# Patient Record
Sex: MALE | Race: WHITE | NOT HISPANIC OR LATINO | Employment: UNEMPLOYED | URBAN - METROPOLITAN AREA
[De-identification: names, ages, dates, MRNs, and addresses within clinical notes are randomized per-mention and may not be internally consistent; named-entity substitution may affect disease eponyms.]

---

## 2017-01-23 ENCOUNTER — HISTORICAL (OUTPATIENT)
Dept: LAB | Facility: HOSPITAL | Age: 62
End: 2017-01-23

## 2017-01-30 ENCOUNTER — HISTORICAL (OUTPATIENT)
Dept: INFUSION THERAPY | Facility: HOSPITAL | Age: 62
End: 2017-01-30

## 2017-01-31 ENCOUNTER — HISTORICAL (OUTPATIENT)
Dept: INFUSION THERAPY | Facility: HOSPITAL | Age: 62
End: 2017-01-31

## 2017-02-01 ENCOUNTER — HISTORICAL (OUTPATIENT)
Dept: INFUSION THERAPY | Facility: HOSPITAL | Age: 62
End: 2017-02-01

## 2017-02-02 ENCOUNTER — HISTORICAL (OUTPATIENT)
Dept: INFUSION THERAPY | Facility: HOSPITAL | Age: 62
End: 2017-02-02

## 2017-02-03 ENCOUNTER — HISTORICAL (OUTPATIENT)
Dept: INFUSION THERAPY | Facility: HOSPITAL | Age: 62
End: 2017-02-03

## 2017-02-04 ENCOUNTER — HISTORICAL (OUTPATIENT)
Dept: INFUSION THERAPY | Facility: HOSPITAL | Age: 62
End: 2017-02-04

## 2017-09-06 ENCOUNTER — HISTORICAL (OUTPATIENT)
Dept: RADIOLOGY | Facility: HOSPITAL | Age: 62
End: 2017-09-06

## 2017-09-06 LAB — POC CREATININE: 1.3 MG/DL (ref 0.6–1.3)

## 2018-04-19 ENCOUNTER — HISTORICAL (OUTPATIENT)
Dept: ADMINISTRATIVE | Facility: HOSPITAL | Age: 63
End: 2018-04-19

## 2018-04-19 LAB
BUN SERPL-MCNC: 22 MG/DL (ref 7–18)
CALCIUM SERPL-MCNC: 10.6 MG/DL (ref 8.5–10.1)
CHLORIDE SERPL-SCNC: 104 MMOL/L (ref 98–107)
CO2 SERPL-SCNC: 29 MMOL/L (ref 21–32)
CREAT SERPL-MCNC: 1.5 MG/DL (ref 0.6–1.3)
CREAT/UREA NIT SERPL: 15
GLUCOSE SERPL-MCNC: 85 MG/DL (ref 74–106)
POTASSIUM SERPL-SCNC: 4.3 MMOL/L (ref 3.5–5.1)
SODIUM SERPL-SCNC: 141 MMOL/L (ref 136–145)

## 2018-04-23 ENCOUNTER — HISTORICAL (OUTPATIENT)
Dept: UROLOGY | Facility: CLINIC | Age: 63
End: 2018-04-23

## 2018-05-21 ENCOUNTER — HISTORICAL (OUTPATIENT)
Dept: ADMINISTRATIVE | Facility: HOSPITAL | Age: 63
End: 2018-05-21

## 2018-05-21 LAB
PSA SERPL-MCNC: 0.2 NG/ML
PTH-INTACT SERPL-MCNC: 6.4 PG/ML (ref 18.4–80.1)

## 2018-05-23 ENCOUNTER — HISTORICAL (OUTPATIENT)
Dept: ADMINISTRATIVE | Facility: HOSPITAL | Age: 63
End: 2018-05-23

## 2018-06-05 ENCOUNTER — HISTORICAL (OUTPATIENT)
Dept: RADIOLOGY | Facility: HOSPITAL | Age: 63
End: 2018-06-05

## 2018-08-10 ENCOUNTER — HISTORICAL (OUTPATIENT)
Dept: ADMINISTRATIVE | Facility: HOSPITAL | Age: 63
End: 2018-08-10

## 2018-08-10 LAB
ABS NEUT (OLG): 3.19 X10(3)/MCL (ref 2.1–9.2)
ALBUMIN SERPL-MCNC: 3.9 GM/DL (ref 3.4–5)
ALBUMIN/GLOB SERPL: 1 RATIO (ref 1–2)
ALP SERPL-CCNC: 76 UNIT/L (ref 45–117)
ALT SERPL-CCNC: 20 UNIT/L (ref 12–78)
AST SERPL-CCNC: 22 UNIT/L (ref 15–37)
BASOPHILS # BLD AUTO: 0.02 X10(3)/MCL
BASOPHILS NFR BLD AUTO: 0 %
BILIRUB SERPL-MCNC: 0.6 MG/DL (ref 0.2–1)
BILIRUBIN DIRECT+TOT PNL SERPL-MCNC: 0.2 MG/DL
BILIRUBIN DIRECT+TOT PNL SERPL-MCNC: 0.4 MG/DL
BUN SERPL-MCNC: 26 MG/DL (ref 7–18)
CALCIUM SERPL-MCNC: 9.6 MG/DL (ref 8.5–10.1)
CHLORIDE SERPL-SCNC: 104 MMOL/L (ref 98–107)
CO2 SERPL-SCNC: 29 MMOL/L (ref 21–32)
CREAT SERPL-MCNC: 1.7 MG/DL (ref 0.6–1.3)
EOSINOPHIL # BLD AUTO: 0.12 10*3/UL
EOSINOPHIL NFR BLD AUTO: 3 %
ERYTHROCYTE [DISTWIDTH] IN BLOOD BY AUTOMATED COUNT: 13.6 % (ref 11.5–14.5)
GLOBULIN SER-MCNC: 3.9 GM/ML (ref 2.3–3.5)
GLUCOSE SERPL-MCNC: 99 MG/DL (ref 74–106)
HAV IGM SERPL QL IA: NONREACTIVE
HBV CORE IGM SERPL QL IA: NONREACTIVE
HBV SURFACE AG SERPL QL IA: NEGATIVE
HCT VFR BLD AUTO: 33.2 % (ref 40–51)
HCV AB SERPL QL IA: NONREACTIVE
HGB BLD-MCNC: 11 GM/DL (ref 13.5–17.5)
HIV 1+2 AB+HIV1 P24 AG SERPL QL IA: NONREACTIVE
IMM GRANULOCYTES # BLD AUTO: 0.04 10*3/UL
IMM GRANULOCYTES NFR BLD AUTO: 1 %
LYMPHOCYTES # BLD AUTO: 0.73 X10(3)/MCL
LYMPHOCYTES NFR BLD AUTO: 16 % (ref 13–40)
MCH RBC QN AUTO: 29.4 PG (ref 26–34)
MCHC RBC AUTO-ENTMCNC: 33.1 GM/DL (ref 31–37)
MCV RBC AUTO: 88.8 FL (ref 80–100)
MONOCYTES # BLD AUTO: 0.43 X10(3)/MCL
MONOCYTES NFR BLD AUTO: 10 % (ref 4–12)
NEUTROPHILS # BLD AUTO: 3.19 X10(3)/MCL
NEUTROPHILS NFR BLD AUTO: 70 X10(3)/MCL
PLATELET # BLD AUTO: 201 X10(3)/MCL (ref 130–400)
PMV BLD AUTO: 9.2 FL (ref 7.4–10.4)
POTASSIUM SERPL-SCNC: 4.7 MMOL/L (ref 3.5–5.1)
PROT SERPL-MCNC: 7.8 GM/DL (ref 6.4–8.2)
RBC # BLD AUTO: 3.74 X10(6)/MCL (ref 4.5–5.9)
SODIUM SERPL-SCNC: 140 MMOL/L (ref 136–145)
WBC # SPEC AUTO: 4.5 X10(3)/MCL (ref 4.5–11)

## 2018-08-23 ENCOUNTER — HISTORICAL (OUTPATIENT)
Dept: CARDIOLOGY | Facility: CLINIC | Age: 63
End: 2018-08-23

## 2018-08-23 LAB
BUN SERPL-MCNC: 23 MG/DL (ref 7–18)
CALCIUM SERPL-MCNC: 9.1 MG/DL (ref 8.5–10.1)
CHLORIDE SERPL-SCNC: 104 MMOL/L (ref 98–107)
CO2 SERPL-SCNC: 27 MMOL/L (ref 21–32)
CREAT SERPL-MCNC: 1.5 MG/DL (ref 0.6–1.3)
CREAT/UREA NIT SERPL: 15
GLUCOSE SERPL-MCNC: 116 MG/DL (ref 74–106)
POTASSIUM SERPL-SCNC: 4.2 MMOL/L (ref 3.5–5.1)
SODIUM SERPL-SCNC: 138 MMOL/L (ref 136–145)

## 2018-10-22 ENCOUNTER — HISTORICAL (OUTPATIENT)
Dept: ADMINISTRATIVE | Facility: HOSPITAL | Age: 63
End: 2018-10-22

## 2018-10-22 LAB — VIT B12 SERPL-MCNC: 185 PG/ML (ref 193–986)

## 2018-11-02 ENCOUNTER — HISTORICAL (OUTPATIENT)
Dept: RADIOLOGY | Facility: HOSPITAL | Age: 63
End: 2018-11-02

## 2019-02-23 ENCOUNTER — HISTORICAL (OUTPATIENT)
Dept: LAB | Facility: HOSPITAL | Age: 64
End: 2019-02-23

## 2019-02-23 LAB
ALBUMIN SERPL-MCNC: 3.8 GM/DL (ref 3.4–5)
ALBUMIN/GLOB SERPL: 1 RATIO (ref 1.1–2)
ALP SERPL-CCNC: 74 UNIT/L (ref 45–117)
ALT SERPL-CCNC: 20 UNIT/L (ref 12–78)
AST SERPL-CCNC: 28 UNIT/L (ref 15–37)
BILIRUB SERPL-MCNC: 0.6 MG/DL (ref 0.2–1)
BILIRUBIN DIRECT+TOT PNL SERPL-MCNC: 0.2 MG/DL
BILIRUBIN DIRECT+TOT PNL SERPL-MCNC: 0.4 MG/DL
BUN SERPL-MCNC: 31 MG/DL (ref 7–18)
CALCIUM SERPL-MCNC: 9.7 MG/DL (ref 8.5–10.1)
CHLORIDE SERPL-SCNC: 104 MMOL/L (ref 98–107)
CHOLEST SERPL-MCNC: 153 MG/DL
CHOLEST/HDLC SERPL: 2.8 {RATIO} (ref 0–5)
CO2 SERPL-SCNC: 27 MMOL/L (ref 21–32)
CREAT SERPL-MCNC: 1.8 MG/DL (ref 0.6–1.3)
GLOBULIN SER-MCNC: 4 GM/ML (ref 2.3–3.5)
GLUCOSE SERPL-MCNC: 96 MG/DL (ref 74–106)
HDLC SERPL-MCNC: 54 MG/DL
LDLC SERPL CALC-MCNC: 75 MG/DL (ref 0–130)
POTASSIUM SERPL-SCNC: 5 MMOL/L (ref 3.5–5.1)
PROT SERPL-MCNC: 7.8 GM/DL (ref 6.4–8.2)
PSA SERPL-MCNC: 0.2 NG/ML
SODIUM SERPL-SCNC: 136 MMOL/L (ref 136–145)
TRIGL SERPL-MCNC: 121 MG/DL
VLDLC SERPL CALC-MCNC: 24 MG/DL

## 2019-06-26 ENCOUNTER — HISTORICAL (OUTPATIENT)
Dept: RADIOLOGY | Facility: HOSPITAL | Age: 64
End: 2019-06-26

## 2019-08-27 ENCOUNTER — HISTORICAL (OUTPATIENT)
Dept: ADMINISTRATIVE | Facility: HOSPITAL | Age: 64
End: 2019-08-27

## 2019-08-27 LAB
BUN SERPL-MCNC: 29 MG/DL (ref 7–18)
CALCIUM SERPL-MCNC: 9.2 MG/DL (ref 8.5–10.1)
CHLORIDE SERPL-SCNC: 106 MMOL/L (ref 98–107)
CO2 SERPL-SCNC: 26 MMOL/L (ref 21–32)
CREAT SERPL-MCNC: 1.9 MG/DL (ref 0.6–1.3)
CREAT/UREA NIT SERPL: 15
GLUCOSE SERPL-MCNC: 114 MG/DL (ref 74–106)
POTASSIUM SERPL-SCNC: 5.1 MMOL/L (ref 3.5–5.1)
SODIUM SERPL-SCNC: 138 MMOL/L (ref 136–145)

## 2019-09-10 ENCOUNTER — HISTORICAL (OUTPATIENT)
Dept: ADMINISTRATIVE | Facility: HOSPITAL | Age: 64
End: 2019-09-10

## 2019-09-10 LAB
BUN SERPL-MCNC: 20 MG/DL (ref 7–18)
CALCIUM SERPL-MCNC: 8.8 MG/DL (ref 8.5–10.1)
CHLORIDE SERPL-SCNC: 104 MMOL/L (ref 98–107)
CO2 SERPL-SCNC: 26 MMOL/L (ref 21–32)
CREAT SERPL-MCNC: 1.7 MG/DL (ref 0.6–1.3)
CREAT/UREA NIT SERPL: 12
GLUCOSE SERPL-MCNC: 116 MG/DL (ref 74–106)
POTASSIUM SERPL-SCNC: 4.7 MMOL/L (ref 3.5–5.1)
SODIUM SERPL-SCNC: 138 MMOL/L (ref 136–145)

## 2019-09-24 ENCOUNTER — HISTORICAL (OUTPATIENT)
Dept: CARDIOLOGY | Facility: CLINIC | Age: 64
End: 2019-09-24

## 2019-09-24 LAB
BUN SERPL-MCNC: 26 MG/DL (ref 7–18)
CALCIUM SERPL-MCNC: 9.6 MG/DL (ref 8.5–10.1)
CHLORIDE SERPL-SCNC: 107 MMOL/L (ref 98–107)
CO2 SERPL-SCNC: 25 MMOL/L (ref 21–32)
CREAT SERPL-MCNC: 1.7 MG/DL (ref 0.6–1.3)
CREAT/UREA NIT SERPL: 15
GLUCOSE SERPL-MCNC: 100 MG/DL (ref 74–106)
POTASSIUM SERPL-SCNC: 4.9 MMOL/L (ref 3.5–5.1)
SODIUM SERPL-SCNC: 139 MMOL/L (ref 136–145)

## 2019-12-18 ENCOUNTER — HISTORICAL (OUTPATIENT)
Dept: NEPHROLOGY | Facility: CLINIC | Age: 64
End: 2019-12-18

## 2019-12-18 LAB
ALBUMIN SERPL-MCNC: 3.7 GM/DL (ref 3.4–5)
ALBUMIN/GLOB SERPL: 0.8 RATIO (ref 1.1–2)
ALP SERPL-CCNC: 92 UNIT/L (ref 45–117)
ALT SERPL-CCNC: 17 UNIT/L (ref 12–78)
APPEARANCE, UA: CLEAR
AST SERPL-CCNC: 18 UNIT/L (ref 15–37)
BACTERIA #/AREA URNS AUTO: ABNORMAL /HPF
BILIRUB SERPL-MCNC: 0.9 MG/DL (ref 0.2–1)
BILIRUB UR QL STRIP: NEGATIVE
BILIRUBIN DIRECT+TOT PNL SERPL-MCNC: 0.2 MG/DL (ref 0–0.2)
BILIRUBIN DIRECT+TOT PNL SERPL-MCNC: 0.7 MG/DL
BUN SERPL-MCNC: 28 MG/DL (ref 7–18)
CALCIUM SERPL-MCNC: 9.4 MG/DL (ref 8.5–10.1)
CHLORIDE SERPL-SCNC: 104 MMOL/L (ref 98–107)
CO2 SERPL-SCNC: 24 MMOL/L (ref 21–32)
COLOR UR: YELLOW
CREAT SERPL-MCNC: 2 MG/DL (ref 0.6–1.3)
CREAT UR-MCNC: 283 MG/DL
GLOBULIN SER-MCNC: 4.5 GM/ML (ref 2.3–3.5)
GLUCOSE (UA): NEGATIVE
GLUCOSE SERPL-MCNC: 128 MG/DL (ref 74–106)
HGB UR QL STRIP: 0.03 MG/DL
HYALINE CASTS #/AREA URNS LPF: ABNORMAL /LPF
KETONES UR QL STRIP: NEGATIVE
LEUKOCYTE ESTERASE UR QL STRIP: NEGATIVE
MUCOUS THREADS URNS QL MICRO: ABNORMAL
NITRITE UR QL STRIP: NEGATIVE
PH UR STRIP: 5.5 [PH] (ref 4.5–8)
POTASSIUM SERPL-SCNC: 4.8 MMOL/L (ref 3.5–5.1)
PROT SERPL-MCNC: 8.2 GM/DL (ref 6.4–8.2)
PROT UR QL STRIP: 30 MG/DL
PROT UR STRIP-MCNC: 50.9 MG/DL
PROT/CREAT UR-RTO: 179.9 MG/GM
RBC #/AREA URNS AUTO: ABNORMAL /HPF
SODIUM SERPL-SCNC: 136 MMOL/L (ref 136–145)
SP GR UR STRIP: 1.01 (ref 1–1.03)
SQUAMOUS #/AREA URNS LPF: ABNORMAL /LPF
UROBILINOGEN UR STRIP-ACNC: NORMAL
WBC #/AREA URNS AUTO: ABNORMAL /HPF

## 2020-07-07 ENCOUNTER — HISTORICAL (OUTPATIENT)
Dept: CARDIOLOGY | Facility: CLINIC | Age: 65
End: 2020-07-07

## 2020-07-07 LAB
ABS NEUT (OLG): 2.78 X10(3)/MCL (ref 2.1–9.2)
ALBUMIN SERPL-MCNC: 3.6 GM/DL (ref 3.4–5)
ALBUMIN/GLOB SERPL: 0.9 RATIO (ref 1.1–2)
ALP SERPL-CCNC: 114 UNIT/L (ref 45–117)
ALT SERPL-CCNC: 21 UNIT/L (ref 12–78)
AST SERPL-CCNC: 17 UNIT/L (ref 15–37)
BASOPHILS # BLD AUTO: 0 X10(3)/MCL (ref 0–0.2)
BASOPHILS NFR BLD AUTO: 0 %
BILIRUB SERPL-MCNC: 0.6 MG/DL (ref 0.2–1)
BILIRUBIN DIRECT+TOT PNL SERPL-MCNC: 0.2 MG/DL (ref 0–0.2)
BILIRUBIN DIRECT+TOT PNL SERPL-MCNC: 0.4 MG/DL
BUN SERPL-MCNC: 25 MG/DL (ref 7–18)
CALCIUM SERPL-MCNC: 8.8 MG/DL (ref 8.5–10.1)
CHLORIDE SERPL-SCNC: 108 MMOL/L (ref 98–107)
CHOLEST SERPL-MCNC: 146 MG/DL
CHOLEST/HDLC SERPL: 3 {RATIO} (ref 0–5)
CO2 SERPL-SCNC: 24 MMOL/L (ref 21–32)
CREAT SERPL-MCNC: 1.8 MG/DL (ref 0.6–1.3)
EOSINOPHIL # BLD AUTO: 0.2 X10(3)/MCL (ref 0–0.9)
EOSINOPHIL NFR BLD AUTO: 5 %
ERYTHROCYTE [DISTWIDTH] IN BLOOD BY AUTOMATED COUNT: 13.7 % (ref 11.5–14.5)
GLOBULIN SER-MCNC: 4.1 GM/ML (ref 2.3–3.5)
GLUCOSE SERPL-MCNC: 122 MG/DL (ref 74–106)
HCT VFR BLD AUTO: 36.9 % (ref 40–51)
HDLC SERPL-MCNC: 48 MG/DL (ref 40–59)
HGB BLD-MCNC: 11.8 GM/DL (ref 13.5–17.5)
IMM GRANULOCYTES # BLD AUTO: 0.05 10*3/UL
IMM GRANULOCYTES NFR BLD AUTO: 1 %
LDLC SERPL CALC-MCNC: 76 MG/DL
LYMPHOCYTES # BLD AUTO: 1 X10(3)/MCL (ref 0.6–4.6)
LYMPHOCYTES NFR BLD AUTO: 23 %
MCH RBC QN AUTO: 28.2 PG (ref 26–34)
MCHC RBC AUTO-ENTMCNC: 32 GM/DL (ref 31–37)
MCV RBC AUTO: 88.1 FL (ref 80–100)
MONOCYTES # BLD AUTO: 0.5 X10(3)/MCL (ref 0.1–1.3)
MONOCYTES NFR BLD AUTO: 11 %
NEUTROPHILS # BLD AUTO: 2.78 X10(3)/MCL (ref 2.1–9.2)
NEUTROPHILS NFR BLD AUTO: 60 %
PLATELET # BLD AUTO: 179 X10(3)/MCL (ref 130–400)
PMV BLD AUTO: 9.2 FL (ref 7.4–10.4)
POTASSIUM SERPL-SCNC: 4.8 MMOL/L (ref 3.5–5.1)
PROT SERPL-MCNC: 7.7 GM/DL (ref 6.4–8.2)
RBC # BLD AUTO: 4.19 X10(6)/MCL (ref 4.5–5.9)
SODIUM SERPL-SCNC: 138 MMOL/L (ref 136–145)
TRIGL SERPL-MCNC: 108 MG/DL
TSH SERPL-ACNC: 1.85 MIU/L (ref 0.36–3.74)
VLDLC SERPL CALC-MCNC: 22 MG/DL
WBC # SPEC AUTO: 4.6 X10(3)/MCL (ref 4.5–11)

## 2020-08-20 ENCOUNTER — HISTORICAL (OUTPATIENT)
Dept: CARDIOLOGY | Facility: CLINIC | Age: 65
End: 2020-08-20

## 2020-08-20 LAB
BUN SERPL-MCNC: 27 MG/DL (ref 7–18)
CALCIUM SERPL-MCNC: 8.9 MG/DL (ref 8.5–10.1)
CHLORIDE SERPL-SCNC: 107 MMOL/L (ref 98–107)
CO2 SERPL-SCNC: 24 MMOL/L (ref 21–32)
CREAT SERPL-MCNC: 1.8 MG/DL (ref 0.6–1.3)
CREAT/UREA NIT SERPL: 15 MG/DL (ref 12–14)
GLUCOSE SERPL-MCNC: 129 MG/DL (ref 74–106)
MAGNESIUM SERPL-MCNC: 1.7 MG/DL (ref 1.6–2.6)
POTASSIUM SERPL-SCNC: 4.5 MMOL/L (ref 3.5–5.1)
SODIUM SERPL-SCNC: 138 MMOL/L (ref 136–145)

## 2020-11-03 ENCOUNTER — HISTORICAL (OUTPATIENT)
Dept: RADIOLOGY | Facility: HOSPITAL | Age: 65
End: 2020-11-03

## 2020-12-15 ENCOUNTER — HISTORICAL (OUTPATIENT)
Dept: CARDIOLOGY | Facility: HOSPITAL | Age: 65
End: 2020-12-15

## 2020-12-15 LAB
BUN SERPL-MCNC: 24 MG/DL (ref 8.4–25.7)
CALCIUM SERPL-MCNC: 9.7 MG/DL (ref 8.8–10)
CHLORIDE SERPL-SCNC: 105 MMOL/L (ref 98–107)
CO2 SERPL-SCNC: 25 MMOL/L (ref 23–31)
CREAT SERPL-MCNC: 1.77 MG/DL (ref 0.73–1.18)
CREAT/UREA NIT SERPL: 14
GLUCOSE SERPL-MCNC: 148 MG/DL (ref 82–115)
MAGNESIUM SERPL-MCNC: 1.6 MG/DL (ref 1.6–2.6)
POTASSIUM SERPL-SCNC: 5.6 MMOL/L (ref 3.5–5.1)
SODIUM SERPL-SCNC: 138 MMOL/L (ref 136–145)

## 2020-12-28 ENCOUNTER — HISTORICAL (OUTPATIENT)
Dept: CARDIOLOGY | Facility: HOSPITAL | Age: 65
End: 2020-12-28

## 2020-12-28 LAB — POTASSIUM SERPL-SCNC: 4.7 MMOL/L (ref 3.5–5.1)

## 2021-01-06 ENCOUNTER — HISTORICAL (OUTPATIENT)
Dept: NEPHROLOGY | Facility: CLINIC | Age: 66
End: 2021-01-06

## 2021-01-06 LAB
ABS NEUT (OLG): 3.31 X10(3)/MCL (ref 2.1–9.2)
ALBUMIN SERPL-MCNC: 4 GM/DL (ref 3.4–4.8)
ALBUMIN/GLOB SERPL: 1.1 RATIO (ref 1.1–2)
ALP SERPL-CCNC: 102 UNIT/L (ref 40–150)
ALT SERPL-CCNC: 14 UNIT/L (ref 0–55)
APPEARANCE, UA: CLEAR
AST SERPL-CCNC: 16 UNIT/L (ref 5–34)
BACTERIA #/AREA URNS AUTO: ABNORMAL /HPF
BASOPHILS # BLD AUTO: 0 X10(3)/MCL (ref 0–0.2)
BASOPHILS NFR BLD AUTO: 0 %
BILIRUB SERPL-MCNC: 0.7 MG/DL
BILIRUB UR QL STRIP: NEGATIVE
BILIRUBIN DIRECT+TOT PNL SERPL-MCNC: 0.3 MG/DL (ref 0–0.5)
BILIRUBIN DIRECT+TOT PNL SERPL-MCNC: 0.4 MG/DL (ref 0–0.8)
BUN SERPL-MCNC: 27 MG/DL (ref 8.4–25.7)
CALCIUM SERPL-MCNC: 9.4 MG/DL (ref 8.8–10)
CHLORIDE SERPL-SCNC: 105 MMOL/L (ref 98–107)
CO2 SERPL-SCNC: 26 MMOL/L (ref 23–31)
COLOR UR: YELLOW
CREAT SERPL-MCNC: 1.91 MG/DL (ref 0.73–1.18)
CREAT UR-MCNC: 159.2 MG/DL (ref 58–161)
EOSINOPHIL # BLD AUTO: 0.4 X10(3)/MCL (ref 0–0.9)
EOSINOPHIL NFR BLD AUTO: 7 %
ERYTHROCYTE [DISTWIDTH] IN BLOOD BY AUTOMATED COUNT: 13.7 % (ref 11.5–14.5)
FERRITIN SERPL-MCNC: 32.71 NG/ML (ref 21.81–274.66)
GLOBULIN SER-MCNC: 3.6 GM/DL (ref 2.4–3.5)
GLUCOSE (UA): NEGATIVE
GLUCOSE SERPL-MCNC: 136 MG/DL (ref 82–115)
HCT VFR BLD AUTO: 38.4 % (ref 40–51)
HGB BLD-MCNC: 12.2 GM/DL (ref 13.5–17.5)
HGB UR QL STRIP: NEGATIVE
HYALINE CASTS #/AREA URNS LPF: ABNORMAL /LPF
IMM GRANULOCYTES # BLD AUTO: 0.05 10*3/UL
IMM GRANULOCYTES NFR BLD AUTO: 1 %
IRON SATN MFR SERPL: 19 % (ref 20–50)
IRON SERPL-MCNC: 52 UG/DL (ref 65–175)
KETONES UR QL STRIP: NEGATIVE
LEUKOCYTE ESTERASE UR QL STRIP: NEGATIVE
LYMPHOCYTES # BLD AUTO: 1.4 X10(3)/MCL (ref 0.6–4.6)
LYMPHOCYTES NFR BLD AUTO: 24 %
MCH RBC QN AUTO: 29 PG (ref 26–34)
MCHC RBC AUTO-ENTMCNC: 31.8 GM/DL (ref 31–37)
MCV RBC AUTO: 91.4 FL (ref 80–100)
MONOCYTES # BLD AUTO: 0.6 X10(3)/MCL (ref 0.1–1.3)
MONOCYTES NFR BLD AUTO: 10 %
NEUTROPHILS # BLD AUTO: 3.31 X10(3)/MCL (ref 2.1–9.2)
NEUTROPHILS NFR BLD AUTO: 57 %
NITRITE UR QL STRIP: NEGATIVE
PH UR STRIP: 5.5 [PH] (ref 4.5–8)
PLATELET # BLD AUTO: 231 X10(3)/MCL (ref 130–400)
PMV BLD AUTO: 9.1 FL (ref 7.4–10.4)
POTASSIUM SERPL-SCNC: 5.3 MMOL/L (ref 3.5–5.1)
PROT SERPL-MCNC: 7.6 GM/DL (ref 5.8–7.6)
PROT UR QL STRIP: 30 MG/DL
PROT UR STRIP-MCNC: 12.4 MG/DL
PROT/CREAT UR-RTO: 77.9 MG/GM
RBC # BLD AUTO: 4.2 X10(6)/MCL (ref 4.5–5.9)
RBC #/AREA URNS AUTO: ABNORMAL /HPF
SODIUM SERPL-SCNC: 138 MMOL/L (ref 136–145)
SP GR UR STRIP: 1.01 (ref 1–1.03)
SQUAMOUS #/AREA URNS LPF: ABNORMAL /LPF
TIBC SERPL-MCNC: 225 UG/DL (ref 69–240)
TIBC SERPL-MCNC: 277 UG/DL (ref 250–450)
TRANSFERRIN SERPL-MCNC: 238 MG/DL (ref 163–344)
UROBILINOGEN UR STRIP-ACNC: NORMAL
WBC # SPEC AUTO: 5.8 X10(3)/MCL (ref 4.5–11)
WBC #/AREA URNS AUTO: ABNORMAL /HPF

## 2021-01-15 ENCOUNTER — HISTORICAL (OUTPATIENT)
Dept: NEPHROLOGY | Facility: CLINIC | Age: 66
End: 2021-01-15

## 2021-01-15 LAB
BUN SERPL-MCNC: 26 MG/DL (ref 8.4–25.7)
CALCIUM SERPL-MCNC: 9.3 MG/DL (ref 8.8–10)
CHLORIDE SERPL-SCNC: 106 MMOL/L (ref 98–107)
CO2 SERPL-SCNC: 26 MMOL/L (ref 23–31)
CREAT SERPL-MCNC: 1.8 MG/DL (ref 0.73–1.18)
CREAT/UREA NIT SERPL: 14
GLUCOSE SERPL-MCNC: 124 MG/DL (ref 82–115)
POTASSIUM SERPL-SCNC: 5.2 MMOL/L (ref 3.5–5.1)
SODIUM SERPL-SCNC: 138 MMOL/L (ref 136–145)

## 2021-01-22 ENCOUNTER — HISTORICAL (OUTPATIENT)
Dept: NEPHROLOGY | Facility: CLINIC | Age: 66
End: 2021-01-22

## 2021-01-22 LAB
ALBUMIN SERPL-MCNC: 3.9 GM/DL (ref 3.4–4.8)
ALBUMIN/GLOB SERPL: 1.1 RATIO (ref 1.1–2)
ALP SERPL-CCNC: 98 UNIT/L (ref 40–150)
ALT SERPL-CCNC: 12 UNIT/L (ref 0–55)
AST SERPL-CCNC: 16 UNIT/L (ref 5–34)
BILIRUB SERPL-MCNC: 0.7 MG/DL
BILIRUBIN DIRECT+TOT PNL SERPL-MCNC: 0.2 MG/DL (ref 0–0.5)
BILIRUBIN DIRECT+TOT PNL SERPL-MCNC: 0.5 MG/DL (ref 0–0.8)
BUN SERPL-MCNC: 16 MG/DL (ref 8.4–25.7)
CALCIUM SERPL-MCNC: 9.2 MG/DL (ref 8.8–10)
CHLORIDE SERPL-SCNC: 104 MMOL/L (ref 98–107)
CO2 SERPL-SCNC: 25 MMOL/L (ref 23–31)
CREAT SERPL-MCNC: 1.67 MG/DL (ref 0.73–1.18)
GLOBULIN SER-MCNC: 3.7 GM/DL (ref 2.4–3.5)
GLUCOSE SERPL-MCNC: 156 MG/DL (ref 82–115)
POTASSIUM SERPL-SCNC: 4.8 MMOL/L (ref 3.5–5.1)
PROT SERPL-MCNC: 7.6 GM/DL (ref 5.8–7.6)
PTH-INTACT SERPL-MCNC: 220.4 PG/ML (ref 18.4–80.1)
SODIUM SERPL-SCNC: 138 MMOL/L (ref 136–145)

## 2021-05-05 ENCOUNTER — HISTORICAL (OUTPATIENT)
Dept: CARDIOLOGY | Facility: CLINIC | Age: 66
End: 2021-05-05

## 2021-05-05 LAB
ALBUMIN SERPL-MCNC: 3.8 GM/DL (ref 3.4–4.8)
ALBUMIN/GLOB SERPL: 1 RATIO (ref 1.1–2)
ALP SERPL-CCNC: 84 UNIT/L (ref 40–150)
ALT SERPL-CCNC: 12 UNIT/L (ref 0–55)
AST SERPL-CCNC: 17 UNIT/L (ref 5–34)
BILIRUB SERPL-MCNC: 0.7 MG/DL
BILIRUBIN DIRECT+TOT PNL SERPL-MCNC: 0.3 MG/DL (ref 0–0.5)
BILIRUBIN DIRECT+TOT PNL SERPL-MCNC: 0.4 MG/DL (ref 0–0.8)
BUN SERPL-MCNC: 26.1 MG/DL (ref 8.4–25.7)
CALCIUM SERPL-MCNC: 9.7 MG/DL (ref 8.8–10)
CHLORIDE SERPL-SCNC: 105 MMOL/L (ref 98–107)
CHOLEST SERPL-MCNC: 146 MG/DL
CHOLEST/HDLC SERPL: 4 {RATIO} (ref 0–5)
CO2 SERPL-SCNC: 23 MMOL/L (ref 23–31)
CREAT SERPL-MCNC: 1.81 MG/DL (ref 0.73–1.18)
GLOBULIN SER-MCNC: 3.7 GM/DL (ref 2.4–3.5)
GLUCOSE SERPL-MCNC: 143 MG/DL (ref 82–115)
HDLC SERPL-MCNC: 36 MG/DL (ref 35–60)
LDLC SERPL CALC-MCNC: 80 MG/DL (ref 50–140)
POTASSIUM SERPL-SCNC: 4.9 MMOL/L (ref 3.5–5.1)
PROT SERPL-MCNC: 7.5 GM/DL (ref 5.8–7.6)
SODIUM SERPL-SCNC: 136 MMOL/L (ref 136–145)
TRIGL SERPL-MCNC: 150 MG/DL (ref 34–140)
VLDLC SERPL CALC-MCNC: 30 MG/DL

## 2021-09-03 ENCOUNTER — HISTORICAL (OUTPATIENT)
Dept: ADMINISTRATIVE | Facility: HOSPITAL | Age: 66
End: 2021-09-03

## 2021-09-03 LAB
ABS NEUT (OLG): 4.22 X10(3)/MCL (ref 2.1–9.2)
ALBUMIN SERPL-MCNC: 3.7 GM/DL (ref 3.4–4.8)
ALBUMIN/GLOB SERPL: 1 RATIO (ref 1.1–2)
ALP SERPL-CCNC: 91 UNIT/L (ref 40–150)
ALT SERPL-CCNC: 12 UNIT/L (ref 0–55)
APPEARANCE, UA: CLEAR
AST SERPL-CCNC: 19 UNIT/L (ref 5–34)
BACTERIA #/AREA URNS AUTO: ABNORMAL /HPF
BASOPHILS # BLD AUTO: 0 X10(3)/MCL (ref 0–0.2)
BASOPHILS NFR BLD AUTO: 0 %
BILIRUB SERPL-MCNC: 0.5 MG/DL
BILIRUB UR QL STRIP: NEGATIVE
BILIRUBIN DIRECT+TOT PNL SERPL-MCNC: 0.2 MG/DL (ref 0–0.5)
BILIRUBIN DIRECT+TOT PNL SERPL-MCNC: 0.3 MG/DL (ref 0–0.8)
BUN SERPL-MCNC: 24.3 MG/DL (ref 8.4–25.7)
CALCIUM SERPL-MCNC: 9.4 MG/DL (ref 8.8–10)
CHLORIDE SERPL-SCNC: 107 MMOL/L (ref 98–107)
CO2 SERPL-SCNC: 21 MMOL/L (ref 23–31)
COLOR UR: YELLOW
CREAT SERPL-MCNC: 2.04 MG/DL (ref 0.73–1.18)
CREAT UR-MCNC: 219.6 MG/DL (ref 58–161)
DEPRECATED CALCIDIOL+CALCIFEROL SERPL-MC: 26.7 NG/ML (ref 30–80)
EOSINOPHIL # BLD AUTO: 0.5 X10(3)/MCL (ref 0–0.9)
EOSINOPHIL NFR BLD AUTO: 7 %
ERYTHROCYTE [DISTWIDTH] IN BLOOD BY AUTOMATED COUNT: 13.8 % (ref 11.5–14.5)
GLOBULIN SER-MCNC: 3.8 GM/DL (ref 2.4–3.5)
GLUCOSE (UA): NEGATIVE
GLUCOSE SERPL-MCNC: 150 MG/DL (ref 82–115)
HCT VFR BLD AUTO: 36.9 % (ref 40–51)
HGB BLD-MCNC: 11.9 GM/DL (ref 13.5–17.5)
HGB UR QL STRIP: 0.03 MG/DL
HYALINE CASTS #/AREA URNS LPF: ABNORMAL /LPF
IMM GRANULOCYTES # BLD AUTO: 0.06 10*3/UL
IMM GRANULOCYTES NFR BLD AUTO: 1 %
KETONES UR QL STRIP: NEGATIVE
LEUKOCYTE ESTERASE UR QL STRIP: 25 LEU/UL
LYMPHOCYTES # BLD AUTO: 1.6 X10(3)/MCL (ref 0.6–4.6)
LYMPHOCYTES NFR BLD AUTO: 24 %
MAGNESIUM SERPL-MCNC: 1.7 MG/DL (ref 1.6–2.6)
MCH RBC QN AUTO: 28.7 PG (ref 26–34)
MCHC RBC AUTO-ENTMCNC: 32.2 GM/DL (ref 31–37)
MCV RBC AUTO: 89.1 FL (ref 80–100)
MONOCYTES # BLD AUTO: 0.5 X10(3)/MCL (ref 0.1–1.3)
MONOCYTES NFR BLD AUTO: 7 %
NEUTROPHILS # BLD AUTO: 4.22 X10(3)/MCL (ref 2.1–9.2)
NEUTROPHILS NFR BLD AUTO: 62 %
NITRITE UR QL STRIP: NEGATIVE
NRBC BLD AUTO-RTO: 0 % (ref 0–0.2)
PH UR STRIP: 5.5 [PH] (ref 4.5–8)
PHOSPHATE SERPL-MCNC: 2.9 MG/DL (ref 2.3–4.7)
PLATELET # BLD AUTO: 276 X10(3)/MCL (ref 130–400)
PMV BLD AUTO: 9.3 FL (ref 7.4–10.4)
POTASSIUM SERPL-SCNC: 5 MMOL/L (ref 3.5–5.1)
PROT SERPL-MCNC: 7.5 GM/DL (ref 5.8–7.6)
PROT UR QL STRIP: 10 MG/DL
PROT UR STRIP-MCNC: 13.4 MG/DL
PROT/CREAT UR-RTO: 61 MG/GM CR
PTH-INTACT SERPL-MCNC: 180.1 PG/ML (ref 8.7–77)
RBC # BLD AUTO: 4.14 X10(6)/MCL (ref 4.5–5.9)
RBC #/AREA URNS AUTO: ABNORMAL /HPF
SODIUM SERPL-SCNC: 136 MMOL/L (ref 136–145)
SP GR UR STRIP: 1.01 (ref 1–1.03)
SQUAMOUS #/AREA URNS LPF: ABNORMAL /LPF
UROBILINOGEN UR STRIP-ACNC: NORMAL
WBC # SPEC AUTO: 6.8 X10(3)/MCL (ref 4.5–11)
WBC #/AREA URNS AUTO: ABNORMAL /HPF

## 2021-09-05 LAB — FINAL CULTURE: NORMAL

## 2022-01-11 ENCOUNTER — HISTORICAL (OUTPATIENT)
Dept: NEPHROLOGY | Facility: CLINIC | Age: 67
End: 2022-01-11

## 2022-01-11 LAB
ABS NEUT (OLG): 3.45 X10(3)/MCL (ref 2.1–9.2)
ALBUMIN SERPL-MCNC: 3.9 GM/DL (ref 3.4–4.8)
ALBUMIN/GLOB SERPL: 1.1 RATIO (ref 1.1–2)
ALP SERPL-CCNC: 112 UNIT/L (ref 40–150)
ALT SERPL-CCNC: 13 UNIT/L (ref 0–55)
AST SERPL-CCNC: 18 UNIT/L (ref 5–34)
BASOPHILS # BLD AUTO: 0 X10(3)/MCL (ref 0–0.2)
BASOPHILS NFR BLD AUTO: 0 %
BILIRUB SERPL-MCNC: 0.8 MG/DL
BILIRUBIN DIRECT+TOT PNL SERPL-MCNC: 0.3 MG/DL (ref 0–0.5)
BILIRUBIN DIRECT+TOT PNL SERPL-MCNC: 0.5 MG/DL (ref 0–0.8)
BUN SERPL-MCNC: 18.1 MG/DL (ref 8.4–25.7)
CALCIUM SERPL-MCNC: 9.7 MG/DL (ref 8.7–10.5)
CHLORIDE SERPL-SCNC: 101 MMOL/L (ref 98–107)
CHOLEST SERPL-MCNC: 138 MG/DL
CHOLEST/HDLC SERPL: 4 {RATIO} (ref 0–5)
CO2 SERPL-SCNC: 23 MMOL/L (ref 23–31)
CREAT SERPL-MCNC: 1.87 MG/DL (ref 0.73–1.18)
CREAT UR-MCNC: 250.7 MG/DL (ref 58–161)
EOSINOPHIL # BLD AUTO: 0.3 X10(3)/MCL (ref 0–0.9)
EOSINOPHIL NFR BLD AUTO: 5 %
ERYTHROCYTE [DISTWIDTH] IN BLOOD BY AUTOMATED COUNT: 13.8 % (ref 11.5–14.5)
GLOBULIN SER-MCNC: 3.7 GM/DL (ref 2.4–3.5)
GLUCOSE SERPL-MCNC: 116 MG/DL (ref 82–115)
HCT VFR BLD AUTO: 37.8 % (ref 40–51)
HDLC SERPL-MCNC: 38 MG/DL (ref 35–60)
HGB BLD-MCNC: 12.1 GM/DL (ref 13.5–17.5)
IMM GRANULOCYTES # BLD AUTO: 0.04 10*3/UL
IMM GRANULOCYTES NFR BLD AUTO: 1 %
LDLC SERPL CALC-MCNC: 69 MG/DL (ref 50–140)
LYMPHOCYTES # BLD AUTO: 1.1 X10(3)/MCL (ref 0.6–4.6)
LYMPHOCYTES NFR BLD AUTO: 20 %
MCH RBC QN AUTO: 29.1 PG (ref 26–34)
MCHC RBC AUTO-ENTMCNC: 32 GM/DL (ref 31–37)
MCV RBC AUTO: 90.9 FL (ref 80–100)
MONOCYTES # BLD AUTO: 0.5 X10(3)/MCL (ref 0.1–1.3)
MONOCYTES NFR BLD AUTO: 9 %
NEUTROPHILS # BLD AUTO: 3.45 X10(3)/MCL (ref 2.1–9.2)
NEUTROPHILS NFR BLD AUTO: 65 %
NRBC BLD AUTO-RTO: 0 % (ref 0–0.2)
PHOSPHATE SERPL-MCNC: 2.4 MG/DL (ref 2.3–4.7)
PLATELET # BLD AUTO: 249 X10(3)/MCL (ref 130–400)
PMV BLD AUTO: 9.6 FL (ref 7.4–10.4)
POTASSIUM SERPL-SCNC: 5.1 MMOL/L (ref 3.5–5.1)
PROT SERPL-MCNC: 7.6 GM/DL (ref 5.8–7.6)
PROT UR STRIP-MCNC: 15.4 MG/DL
PROT/CREAT UR-RTO: 61.4 MG/GM CR
PTH-INTACT SERPL-MCNC: 163.3 PG/ML (ref 8.7–77)
RBC # BLD AUTO: 4.16 X10(6)/MCL (ref 4.5–5.9)
SODIUM SERPL-SCNC: 133 MMOL/L (ref 136–145)
TRIGL SERPL-MCNC: 154 MG/DL (ref 34–140)
URATE SERPL-MCNC: 11.3 MG/DL (ref 3.5–7.2)
VLDLC SERPL CALC-MCNC: 31 MG/DL
WBC # SPEC AUTO: 5.3 X10(3)/MCL (ref 4.5–11)

## 2022-04-05 ENCOUNTER — HISTORICAL (OUTPATIENT)
Dept: ADMINISTRATIVE | Facility: HOSPITAL | Age: 67
End: 2022-04-05

## 2022-04-05 LAB
ABS NEUT (OLG): 4.11 (ref 2.1–9.2)
ALBUMIN SERPL-MCNC: 3.8 G/DL (ref 3.4–4.8)
ALBUMIN/GLOB SERPL: 1 {RATIO} (ref 1.1–2)
ALP SERPL-CCNC: 88 U/L (ref 40–150)
ALT SERPL-CCNC: 17 U/L (ref 0–55)
AST SERPL-CCNC: 18 U/L (ref 5–34)
BASOPHILS # BLD AUTO: 0 10*3/UL (ref 0–0.2)
BASOPHILS NFR BLD AUTO: 0 %
BILIRUB SERPL-MCNC: 0.8 MG/DL
BILIRUBIN DIRECT+TOT PNL SERPL-MCNC: 0.3 (ref 0–0.5)
BILIRUBIN DIRECT+TOT PNL SERPL-MCNC: 0.5 (ref 0–0.8)
BUN SERPL-MCNC: 23.9 MG/DL (ref 8.4–25.7)
CALCIUM SERPL-MCNC: 9.9 MG/DL (ref 8.7–10.5)
CHLORIDE SERPL-SCNC: 104 MMOL/L (ref 98–107)
CO2 SERPL-SCNC: 24 MMOL/L (ref 23–31)
CREAT SERPL-MCNC: 1.87 MG/DL (ref 0.73–1.18)
EOSINOPHIL # BLD AUTO: 0.3 10*3/UL (ref 0–0.9)
EOSINOPHIL NFR BLD AUTO: 4 %
ERYTHROCYTE [DISTWIDTH] IN BLOOD BY AUTOMATED COUNT: 13.9 % (ref 11.5–14.5)
FLAG2 (OHS): 70
FLAG3 (OHS): 80
FLAGS (OHS): 80
GLOBULIN SER-MCNC: 3.9 G/DL (ref 2.4–3.5)
GLUCOSE SERPL-MCNC: 130 MG/DL (ref 82–115)
HCT VFR BLD AUTO: 37.2 % (ref 40–51)
HEMOLYSIS INTERF INDEX SERPL-ACNC: 6
HGB BLD-MCNC: 12.1 G/DL (ref 13.5–17.5)
ICTERIC INTERF INDEX SERPL-ACNC: 1
IMM GRANULOCYTES # BLD AUTO: 0.09 10*3/UL
IMM GRANULOCYTES NFR BLD AUTO: 1 %
IMM. NE 1 SUSPECT FLAG (OHS): 10
LIPEMIC INTERF INDEX SERPL-ACNC: 15
LOW EVENT # SUSPECT FLAG (OHS): 80
LYMPHOCYTES # BLD AUTO: 1.3 10*3/UL (ref 0.6–4.6)
LYMPHOCYTES NFR BLD AUTO: 20 %
MANUAL DIFF? (OHS): NO
MCH RBC QN AUTO: 29 PG (ref 26–34)
MCHC RBC AUTO-ENTMCNC: 32.5 G/DL (ref 31–37)
MCV RBC AUTO: 89.2 FL (ref 80–100)
MO BLASTS SUSPECT FLAG (OHS): 40
MONOCYTES # BLD AUTO: 0.7 10*3/UL (ref 0.1–1.3)
MONOCYTES NFR BLD AUTO: 10 %
NEUTROPHILS # BLD AUTO: 4.11 10*3/UL (ref 2.1–9.2)
NEUTROPHILS NFR BLD AUTO: 64 %
NRBC BLD AUTO-RTO: 0 % (ref 0–0.2)
PLATELET # BLD AUTO: 294 10*3/UL (ref 130–400)
PLATELET CLUMPS SUSPECT FLAG (OHS): 10
PMV BLD AUTO: 9.7 FL (ref 7.4–10.4)
POTASSIUM SERPL-SCNC: 4.7 MMOL/L (ref 3.5–5.1)
PROT SERPL-MCNC: 7.7 G/DL (ref 5.8–7.6)
PTH-INTACT SERPL-MCNC: 139.8 PG/ML (ref 8.7–77)
RBC # BLD AUTO: 4.17 10*6/UL (ref 4.5–5.9)
SODIUM SERPL-SCNC: 137 MMOL/L (ref 136–145)
URATE SERPL-MCNC: 11.1 MG/DL (ref 3.5–7.2)
WBC # SPEC AUTO: 6.4 10*3/UL (ref 4.5–11)

## 2022-04-10 ENCOUNTER — HISTORICAL (OUTPATIENT)
Dept: ADMINISTRATIVE | Facility: HOSPITAL | Age: 67
End: 2022-04-10
Payer: MEDICARE

## 2022-04-29 VITALS
DIASTOLIC BLOOD PRESSURE: 66 MMHG | HEIGHT: 71 IN | WEIGHT: 251.13 LBS | BODY MASS INDEX: 35.16 KG/M2 | SYSTOLIC BLOOD PRESSURE: 108 MMHG | OXYGEN SATURATION: 99 %

## 2022-04-30 NOTE — PROGRESS NOTES
Patient:   Omer Booker            MRN: [id number]            FIN: [id number]               Age:   62 years     Sex:  Male     :  1955   Associated Diagnoses:   Kidney stone   Author:   Fátima Strange MD      Basic Information   63 yo with recurrent stones.   first stone was 20 years ago.  He had about 4-5 stones in this time.   Most recently he has passed 2-3 stones in the past month.   He recently had bilateral 3-4 mm ureteral stones, proximal on right and UVJ on left.  He thinks he passed both.  Never has captured a stone.  No dysuria flank pain frequency or urgency.  Reports continued   nocturia every 2 hours   Reports drinking 2 liters of water per day. 24hr urine not completed. IVP shows no stones. Will order intact PTH due to elevated Calcium on BMP.       Review of Systems   Gastrointestinal:  Negative except for HPI.    Genitourinary:  Negative except for HPI.       Health Status   Allergies:    Allergic Reactions (Selected)  No Known Medication Allergies,    Allergies (1) Active Reaction  No Known Medication Allergies None Documented        Physical Examination      Vital Signs (last 24 hrs)_____  Last Charted___________  Temp Oral     36.7 DegC  (MAY 21 13:04)  Heart Rate Peripheral   69 bpm  (MAY 21 13:04)  Resp Rate         20 br/min  (MAY 21 13:04)  SBP      139 mmHg  (MAY 21 13:04)  DBP      78 mmHg  (MAY 21 13:04)  Weight      88.3 kg  (MAY 21 13:04)  Height      177 cm  (MAY 21 13:04)  BMI      28.18  (MAY 21 13:04)        Review / Management   Results review:     No qualifying data available.    * Final Report *    Reason For Exam  Calculus of ureter;Ureteral Calculi    Radiology Report  XR IV Pyelogram  REASON FOR STUDY: Calculus of ureter;Ureteral Calculi  COMPARISON:  Abdominopelvic CT 2018     TECHNIQUE: A  radiograph of the abdomen was obtained. IV contrast  was then administered with intermittent overhead radiographs obtained  for a total of 25 minutes. Post-void  overhead radiographs were also  performed.     FINDINGS:       radiograph of the abdomen shows multiple radiodensities over the  renal shadows bilaterally. Right upper quadrant surgical clips. No  acute bony pathology. Advanced degenerative changes of the hips  bilaterally.     Renal cortical enhancement is symmetric and transit time is normal.  The collecting systems are of normal caliber. Ureteral opacification  is suboptimal, but grossly there are no obvious collecting system  filling defects. Post void there is adequate decompression of the  collecting systems.     IMPRESSION:      1. Nonobstructing bilateral renal stones.     2. Suboptimal ureteral opacification. Grossly no ureteral filling  defects.       Signature Line  Electronically Signed By: Dylon Castro MD  Date/Time Signed: 04/25/2018 14:13      This document has an image    Result type: XR IV Pyelogram  Result date: April 23, 2018 10:29 CDT  Result status: Auth (Verified)  Result title: XR IV Pyelogram  Performed by: Dylon Castro MD on April 25, 2018 14:10 CDT  Verified by: Dyoln Castro MD on April 25, 2018 14:13 CDT  Encounter info: [id number], [facility], Outpatient, 4/23/2018 - 4/23/2018         Impression and Plan   Diagnosis     Kidney stone ([id number] N20.0).     Orders     Orders (Selected)   Outpatient Orders  Future (On Hold)  Clinic Follow up: *Est. 11/21/18 3:00:00 CST, Order for future visit, Kidney stones, [facility]  PTH Intact: Routine collect, *Est. 05/21/18 3:00:00 CDT, Blood, Order for future visit, *Est. Stop date 05/21/18 3:00:00 CDT, Lab Collect, Kidney stones, 05/21/18 13:31:00 CDT.     Education and Follow-up:          Counseled: Patient.         Discharge Planning: Kidney Stones, Easy-to-Read.

## 2022-05-02 NOTE — HISTORICAL [facility] CERNER
This is a historical note converted from Nicole. Formatting and pictures may have been removed.  Please reference Nicole for original formatting and attached multimedia. Chief Complaint  F/U VISIT, DENIES CHEST PAINS OR SOB  History of Present Illness  Mr. Booker?is a 63-year-old  male with a past medical history of atrial fibrillation,?demyelinating poly-neuropathy, diabetes, hypertension,?and BPH?who presents today for follow-up visit.? Patient states that he remains?consistently fatigued, but this is not changed from previous.??Patient has been taking his medications appropriately and has had no trouble with compliance. ?Patient continues to have difficulty ambulating and states that he can only walk 10-15 steps?and has to have a walker with him. ?This?is unchanged from previous. ?Patient continues?to deny?any?headaches, chest pain,?shortness of breath,?or feelings of atrial fibrillation as he did before.?  Review of Systems  Constitutional:?patient complains of constant fatigue and weakness and relates this to his demyelinating polyneuropathy,?patient states that he feels good today  Eye:?no vision loss or vision changes.? Patient states that eyes are sometimes  Respiratory:?no cough, no wheezing, no shortness of breath  Cardiovascular:?no chest pain, no palpitations, no edema  Gastrointestinal:?no nausea, vomiting, or diarrhea. No abdominal pain  Neurologic: no headache. Periodic vertigo due to polyneuropathy  Physical Exam  Vitals & Measurements  T:?36.9? ?C (Oral)? HR:?80(Peripheral)? RR:?20? BP:?140/82? WT:?110?kg? WT:?110?kg?  General:?well-developed well-nourished in no acute distress  Respiratory:?clear to auscultation bilaterally  Cardiovascular:?regular rate and rhythm with systolic murmur noted at aortic post, no?gallops or rubs  Gastrointestinal:?soft, non-tender, non-distended with normal bowel sounds, without masses to palpation  Neurologic: signs of peripheral sensory and motor deficits  from polyneuropathy  Assessment/Plan  Heart failure with reduced ejection fraction?35 to 40%; NICM  -Continue metoprolol?twice daily, aspirin,?atorvastatin,?lisinopril  -Hold Aldactone until?we receive BMP?results?and potassium level  -Recent echo?(6/26/2019) revealed?LVEF?of 35-40%; Aortic sclerosis without stenosis, mild mitral regurg, mild tricuspid regurg with RVSP of 25 mmHg also revealed also revealed  ?  Paroxysmal atrial fibrillation  -Patient also has?aortic sclerosis without stenosis?according to?most recent echo  -Continue Eliquis as prescribed  ?  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   Follow up for nurse visit in 2 weeks for BP check  Follow up in clinic in 3 months   Problem List/Past Medical History  Ongoing  Atrial fibrillation  [place] palsy  BPH with urinary obstruction  Demyelinating polyneuropathy  Diabetes  Diabetes  Gout  Hypertension  Kidney stone  Leg pain, left  Leg pain, right  Lymphadenopathy  Trouble swallowing  Ureteral stone  Historical  Colitis  DVT (deep venous thrombosis)  Hypertension  Lung mass  Procedure/Surgical History  Repair Face Skin, External Approach (12/03/2016)  Cholecystectomy Laparoscopic (.) (10/10/2016)  Resection of Gallbladder, Percutaneous Endoscopic Approach (10/08/2016)  Bronchoscopy w/ Needle Aspir Biopsy(s) (09/07/2016)  Bronchoscopy, Ebus, 2 or Less Samples (09/07/2016)  Bronchoscopy, rigid or flexible, including fluoroscopic guidance, when performed; with endobronchial ultrasound (EBUS) guided transtracheal and/or transbronchial sampling (eg, aspiration[s]/biopsy[ies]), one or two mediastinal and/or hilar lymph node stat (09/07/2016)  Bronchoscopy, rigid or flexible, including fluoroscopic guidance, when performed; with transbronchial needle aspiration biopsy(s), trachea, main  stem and/or lobar bronchus(i) (09/07/2016)  Excision of Right Main Bronchus, Via Natural or Artificial Opening Endoscopic, Diagnostic (09/07/2016)  Excision of Thorax Lymphatic, Percutaneous Endoscopic Approach, Diagnostic (09/07/2016)  Biopsy Muscle (.) (08/09/2016)  Excision of Tibial Nerve, Open Approach, Diagnostic (08/09/2016)  Repair Right Foot Skin, External Approach (08/09/2016)  Transfusion of Nonautologous Red Blood Cells into Peripheral Artery, Percutaneous Approach (08/06/2016)  Extraction of Iliac Bone Marrow, Percutaneous Approach, Diagnostic (08/04/2016)  Procedure Cancelled Intraoperatively (None) (07/15/2016)  Bronchoscopy w/ Needle Aspir Biopsy(s) (03/17/2016)  bone marrow biopsy  denies  nerve biopsy  wisdom teeth removal   Medications  Aldactone 25 mg oral tablet, 25 mg= 1 tab(s), Oral, BID, 11 refills  aspirin 81 mg oral tablet, 81 mg= 1 tab(s), Oral, Daily, 3 refills  atorvastatin 10 mg oral tablet, 10 mg= 1 tab(s), Oral, Daily, 3 refills  azaTHIOprine 50 mg oral tablet, See Instructions,? ?Not Taking, Completed Rx  azaTHIOprine 50 mg oral tablet, See Instructions  Colace 100 mg oral capsule, 100 mg= 1 cap(s), Oral, BID, PRN  Eliquis 5 mg oral tablet, 5 mg= 1 tab(s), Oral, BID, 11 refills  folic acid 1 mg oral tablet, 1 mg= 1 tab(s), Oral, Daily, 3 refills,? ?Not taking: PT STATED  lisinopril 40 mg oral tablet, 40 mg= 1 tab(s), Oral, Daily, 11 refills  metFORMIN 1000 mg oral tablet, 1000 mg= 1 tab(s), Oral, BID  Metoprolol Succinate  mg oral tablet extended release, 50 mg= 0.5 tab(s), Oral, Daily, 3 refills  prednisONE 10 mg oral tablet, 10 mg= 1 tab(s), Oral, Daily,? ?Not Taking, Completed Rx: PT STATED  Allergies  No Known Allergies  No Known Medication Allergies  Social History  Alcohol  Never, 03/12/2016  Employment/School  Retired, Work/School description: unable to work. Highest education level: High school. Operates hazardous equipment: No., 06/29/2016  Home/Environment  Lives with  Spouse. Living situation: Home/Independent. Home equipment: Walker/Cane, Wheelchair, HOSPITAL BED. Alcohol abuse in household: No. Substance abuse in household: No. Smoker in household: No. Injuries/Abuse/Neglect in household: No. Feels unsafe at home: No. Safe place to go: Yes. Agency(s)/Others notified: No. Family/Friends available for support: Yes., 11/16/2018  Lives with Spouse. Living situation: Home/Independent. Home equipment: Wheelchair, scooter. Alcohol abuse in household: No. Substance abuse in household: No. Smoker in household: No. Injuries/Abuse/Neglect in household: No. Feels unsafe at home: No. Family/Friends available for support: Yes. Concern for family members at home: No. Major illness in household: No. Financial concerns: No. TV/Computer concerns: No., 06/29/2016  Nutrition/Health  Regular, Wants to lose weight: No., 03/22/2016  Sexual  Gender Identity Identifies as male., 02/26/2019  Spiritual/Cultural  Yarsani, 08/27/2019  Substance Use  Never, 03/12/2016  Tobacco  Never (less than 100 in lifetime), Cigarettes, N/A, 08/27/2019  Never (less than 100 in lifetime), N/A, 02/26/2019  Family History  Acute myocardial infarction.: Mother and Brother.  Diabetes mellitus type 1.: Mother.  Heart disease: Father.  Kidney: Father.  Health Maintenance  Health Maintenance  ???Pending?(in the next year)  ??? ??OverDue  ??? ? ? ?Colorectal Screening ([facility]) due??07/13/17??and every 1??year(s)  ??? ? ? ?ADL Screening due??12/04/17??and every 1??year(s)  ??? ? ? ?Advance Directive due??01/01/19??and every 1??year(s)  ??? ? ? ?Alcohol Misuse Screening due??01/01/19??and every 1??year(s)  ??? ? ? ?Cognitive Screening due??01/01/19??and every 1??year(s)  ??? ? ? ?Functional Assessment due??01/01/19??and every 1??year(s)  ??? ? ? ?Geriatric Depression Screening due??01/01/19??and every 1??year(s)  ??? ? ? ?HF-Heart Failure Education due??02/11/19??and every 1??year(s)  ??? ??Due?  ??? ? ? ?Diabetes  Maintenance-Eye Exam due??08/27/19??and every?  ??? ? ? ?Diabetes Maintenance-Foot Exam due??08/27/19??and every?  ??? ? ? ?Hypertension Management-Education due??08/27/19??and every 1??year(s)  ??? ? ? ?Pneumococcal Vaccine due??08/27/19??Variable frequency  ??? ? ? ?Pneumococcal Vaccine due??08/27/19??and every?  ??? ? ? ?Zoster Vaccine due??08/27/19??and every 100??year(s)  ??? ??Due In Future?  ??? ? ? ?Diabetes Maintenance-HgbA1c not due until??10/19/19??and every 1??year(s)  ??? ? ? ?Aspirin Therapy for CVD Prevention not due until??11/29/19??and every 1??year(s)  ??? ? ? ?Fall Risk Assessment not due until??01/01/20??and every 1??year(s)  ??? ? ? ?Obesity Screening not due until??01/01/20??and every 1??year(s)  ??? ? ? ?Diabetes Maintenance-Fasting Lipid Profile not due until??02/23/20??and every 1??year(s)  ??? ? ? ?Hypertension Management-BMP not due until??02/23/20??and every 1??year(s)  ??? ? ? ?Smoking Cessation (Diabetes) not due until??08/09/20??and every 2??year(s)  ??? ? ? ?Hypertension Management-Blood Pressure not due until??08/26/20??and every 1??year(s)  ???Satisfied?(in the past 1 year)  ??? ??Satisfied?  ??? ? ? ?Aspirin Therapy for CVD Prevention on??11/29/18.??Satisfied by Lizz Crandall MD  ??? ? ? ?Blood Pressure Screening on??08/27/19.??Satisfied by Solange López RN  ??? ? ? ?Body Mass Index Check on??08/27/19.??Satisfied by Leelee Smith LPN  ??? ? ? ?Depression Screening on??08/27/19.??Satisfied by Leelee Smith LPN  ??? ? ? ?Diabetes Maintenance-Fasting Lipid Profile on??02/23/19.??Satisfied by Aminata Martin  ??? ? ? ?Diabetes Screening on??02/23/19.??Satisfied by Aminata Martin  ??? ? ? ?Fall Risk Assessment on??08/27/19.??Satisfied by Leelee Smith LPN  ??? ? ? ?Hypertension Management-BMP on??02/23/19.??Satisfied by Aminata Martin  ??? ? ? ?Hypertension Management-Blood Pressure on??08/27/19.??Satisfied by Rene DOWNING, Solange GLASER  ??? ? ? ?Influenza Vaccine  on??10/10/18.  ??? ? ? ?Lipid Screening on??02/23/19.??Satisfied by Aminata Martin  ??? ? ? ?Obesity Screening on??08/27/19.??Satisfied by Leelee Smith LPN  ?      Referral:  - referral to renal for evaluation of renal function   The patient is 63 years old and has past medical history of atrial fibrillation.? The patient also has a history of demyelinating polyneuropathy.? Patient comes today for routine follow-up appointment.? Patient expressed complaints of fatigue.? This is at baseline.? On review of patients labs, patients potassium was trending upward.? Thus, will recommend that Aldactone be held, and potassium level be checked.? Will also have patient come back in 2 weeks for blood pressure check.  ?   The patient was seen and evaluated by me.? The patient was discussed with the intern.? Please see the interns note for further details.

## 2022-05-02 NOTE — HISTORICAL [facility] CERNER
This is a historical note converted from Nicole. Formatting and pictures may have been removed.  Please reference Nicole for original formatting and attached multimedia. Chief Complaint  FOLLOW UP ON PREDNISONE  History of Present Illness  5/25/18?  Mr. Booker is a 62-year-old??man?with PMH significant for CIDP which is presenting to the [facility] neurology?clinic?for follow-up visit. ?Last visit was in May 2017.? Today, he is presenting to clinic stating that he?is unchanged?and is at baseline.? He denies additional symptoms or worsening of current symptoms. ?He feels chronic fatigue, but this is normal?for him now. ?He has weakness mostly in his lower extremities and is?requiring wheelchair?for mobility.? His upper body strength?is stronger?and he is able to lift his body up.? Therefore, he does use a walker?for very short distances.? He is independent with ADLs with some support from his wife was living with him at home.? He is currently not on any medication.? His last IVIG treatment was in February 2017. ?Patient reports only?effective for less than 1 week.? He has?prednisone PO PRN, but has not required the use of it?for some time.? He also stated that?prednisone?does not last very long and he does not feel any different while taking.  ?   8/10/18 Returns for sched f/u of CIDP management. He is currently prescribed prednisone 10 mg daily however patient does not receive benefit from taking on a daily basis.? His usual symptoms of fatigue and pain prevail after a few days of daily use.? Rather when administered in pulse doses he is able to get some symptom relief x2-3 days on reinitiation.? His disease state has not progressed.? His wife is present in office today.? They are interested in alternative medications for additional symptom relief.  ?  Review of Systems  Constitutional: No fevers; No chills; No night sweats; Fatigue  HEENT: No headache; No acute vision changes; No hearing loss  Cardiovascular: No  chest pain; No palpitations; No orthopnea; No FERNANDEZ  Respiratory: No SOB; No cough; No wheezing  Gastrointestinal: No abdominal pain; No nausea; No vomiting; No diarrhea; No constipation; No hematochezia; No melena; No appetite changes  Genitourinary: No dysuria; No hematuria; No urinary incontinence; No increased urinary?frequency  Musculoskeletal: No muscle pain; weakness in upper and lower extremities, more in the lower external extremities  Skin: No rashes, bruises,?or lesions  Neurological: No headaches; No dizziness; No seizures, No numbness; No syncope  Psychiatric: No depression; No?anxiety  Immunological: No increased in illnesses  Physical Exam  Vitals & Measurements  T:?36.7? ?C (Oral)? HR:?73(Peripheral)? RR:?20? BP:?135/79?  HT:?177.8?cm? HT:?177.8?cm? WT:?93.9?kg? WT:?93.9?kg? BMI:?29.7?  GENERAL: Alert and Oriented to person, place, time, and event;?In no?acute distress  HEENT: Extra-ocular motor grossly intact; Normal conjunctiva  NECK: Supple; No lymphadenopathy; No JVD;?No masses or thyromegaly  CARDIOVASCULAR: Regular rate and rhythm; S1/S2 NML; No murmurs, rubs or gallops; No carotid bruits  RESPIRATORY: Clear to auscultation bilaterally; No rhonchi, rales, or wheezing  GASTROINTESTINAL: Soft; Not tender; Not distended, not tender; BS+; No guarding  GENITOURINARY: No CVA tenderness; No tenderness with palpation of the hypogastric abdominal region  MUSKULOSKELETAL: Normal ROM; Shoulder flexion?strength 5/5,?elbow flexion and extension strength?5/5,?left hand  strength 4/5, right hand  strength 5/5; right hip flexion and knee extension strength 4/5, left hip flexion and knee extension strength very weak 4/5  EXTREMETIES: No peripheral edema; Peripheral pulses intact  SKIN: No new rashes; Warm, Dry, Intact skin  NEURO: CN II-XII grossly intact  PSYCHIATRIC: Cooperative; Appropriate mood and affect?  Assessment/Plan  Chronic Inflammatory Demyelinating Polyneuropathy  Mobility Deficit  -  Currently at baseline, unchanged from last visit  -?Cont Prednisone prn  ?- Start MTX 12.5 mg weekly, Folic acid 1mg daily  - get HIV, Hep panel; advised to hold MTX until results  ?  ?   Follow-up to [facility] in?3 months   Problem List/Past Medical History  Ongoing  Atrial fibrillation  Bunn palsy  BPH with urinary obstruction  Demyelinating polyneuropathy  Diabetes  Diabetes  DVT (deep venous thrombosis)  Gout  Hypertension  Kidney stone  Leg pain, left  Leg pain, right  Lung mass  Lymphadenopathy  Trouble swallowing  Ureteral stone  Historical  Colitis  Hypertension  Procedure/Surgical History  Repair Face Skin, External Approach (12/03/2016)  Cholecystectomy Laparoscopic (.) (10/10/2016)  Resection of Gallbladder, Percutaneous Endoscopic Approach (10/08/2016)  Bronchoscopy w/ Needle Aspir Biopsy(s) (09/07/2016)  Bronchoscopy, Ebus, 2 or Less Samples (09/07/2016)  Bronchoscopy, rigid or flexible, including fluoroscopic guidance, when performed; with endobronchial ultrasound (EBUS) guided transtracheal and/or transbronchial sampling (eg, aspiration[s]/biopsy[ies]), one or two mediastinal and/or hilar lymph node stat (09/07/2016)  Bronchoscopy, rigid or flexible, including fluoroscopic guidance, when performed; with transbronchial needle aspiration biopsy(s), trachea, main stem and/or lobar bronchus(i) (09/07/2016)  Excision of Right Main Bronchus, Via Natural or Artificial Opening Endoscopic, Diagnostic (09/07/2016)  Excision of Thorax Lymphatic, Percutaneous Endoscopic Approach, Diagnostic (09/07/2016)  Biopsy Muscle (.) (08/09/2016)  Excision of Tibial Nerve, Open Approach, Diagnostic (08/09/2016)  Repair Right Foot Skin, External Approach (08/09/2016)  Transfusion of Nonautologous Red Blood Cells into Peripheral Artery, Percutaneous Approach (08/06/2016)  Extraction of Iliac Bone Marrow, Percutaneous Approach, Diagnostic (08/04/2016)  Procedure Cancelled Intraoperatively (None)  (07/15/2016)  Bronchoscopy w/ Needle Aspir Biopsy(s) (03/17/2016)  bone marrow biopsy  denies  nerve biopsy  wisdom teeth removal   Medications  aspirin 81 mg oral tablet, 81 mg= 1 tab(s), Oral, Daily, 3 refills,? ?Not taking: PT STATED  atorvastatin 10 mg oral tablet, 10 mg= 1 tab(s), Oral, Daily, 3 refills  Colace 100 mg oral capsule, 100 mg= 1 cap(s), Oral, BID, PRN  Eliquis 5 mg oral tablet, 5 mg= 1 tab(s), Oral, BID, 11 refills  lisinopril 20 mg oral tablet, 20 mg= 1 tab(s), Oral, Daily, 11 refills  metFORMIN 1000 mg oral tablet, 1000 mg= 1 tab(s), Oral, BID  METOPROL TAR 50MG TAB, 50 mg= 1 tab(s), Oral, BID  metoprolol tartrate 50 mg oral tab, 50 mg= 1 tab(s), Oral, BID, 11 refills  prednisONE 10 mg oral tablet, 10 mg= 1 tab(s), Oral, Daily,? ?Still taking, not as prescribed: PT STATED TAKES ONCE EVERY 4-5 DAYS  Allergies  No Known Allergies  No Known Medication Allergies  Social History  Alcohol  Never, 03/12/2016  Employment/School  Retired, Work/School description: unable to work. Highest education level: High school. Operates hazardous equipment: No., 06/29/2016  Home/Environment  Lives with Spouse. Living situation: Home/Independent. Home equipment: Wheelchair, scooter. Alcohol abuse in household: No. Substance abuse in household: No. Smoker in household: No. Injuries/Abuse/Neglect in household: No. Feels unsafe at home: No. Family/Friends available for support: Yes. Concern for family members at home: No. Major illness in household: No. Financial concerns: No. TV/Computer concerns: No., 06/29/2016  Nutrition/Health  Regular, Wants to lose weight: No., 03/22/2016  Substance Abuse  Never, 03/12/2016  Tobacco  Never smoker, 03/12/2016  Family History  Acute myocardial infarction.: Mother and Brother.  Diabetes mellitus type 1.: Mother.  Heart disease: Father.  Kidney: Father.  Immunizations  Vaccine Date Status Comments   tetanus-diphtheria toxoids 12/03/2016 Given    influenza virus vaccine, inactivated -  Not Given Expectation Not Necessary  Task Duplication   influenza virus vaccine, inactivated 10/11/2016 Given    Health Maintenance  Health Maintenance  ???Pending?(in the next year)  ??? ??OverDue  ??? ? ? ?Smoking Cessation due??04/07/17??and every 1??year(s)  ??? ? ? ?Colorectal Screening due??07/13/17??and every 1??year(s)  ??? ? ? ?Diabetes Maintenance-HgbA1c due??01/23/18??and every 1??year(s)  ??? ??Due?  ??? ? ? ?Alcohol Misuse Screening due??08/10/18??and every 1??year(s)  ??? ? ? ?Diabetes Maintenance-Eye Exam due??08/10/18??and every?  ??? ? ? ?Diabetes Maintenance-Fasting Lipid Profile due??08/10/18??and every?  ??? ? ? ?Diabetes Maintenance-Foot Exam due??08/10/18??and every?  ??? ? ? ?Hypertension Management-Education due??08/10/18??and every 1??year(s)  ??? ? ? ?Influenza Vaccine due??08/10/18??and every?  ??? ? ? ?Zoster Vaccine due??08/10/18??and every 100??year(s)  ??? ??Due In Future?  ??? ? ? ?Hypertension Management-BMP not due until??04/19/19??and every 1??year(s)  ??? ? ? ?Depression Screening not due until??05/25/19??and every 1??year(s)  ??? ? ? ?Blood Pressure Screening not due until??07/26/19??and every 1??year(s)  ??? ? ? ?Body Mass Index Check not due until??07/26/19??and every 1??year(s)  ??? ? ? ?Hypertension Management-Blood Pressure not due until??07/26/19??and every 1??year(s)  ??? ? ? ?Aspirin Therapy for CVD Prevention not due until??07/26/19??and every 1??year(s)  ???Satisfied?(in the past 1 year)  ??? ??Satisfied?  ??? ? ? ?Aspirin Therapy for CVD Prevention on??07/26/18.??Satisfied by Chivo Aguilera III, MD  ??? ? ? ?Blood Pressure Screening on??08/10/18.??Satisfied by Megan Jordan RN  ??? ? ? ?Body Mass Index Check on??08/10/18.??Satisfied by Megan Jordan RN  ??? ? ? ?Depression Screening on??08/10/18.??Satisfied by Megan Jordan RN  ??? ? ? ?Diabetes Screening on??08/26/18.??Satisfied by Chivo Aguilera III, MD  ??? ? ? ?Hypertension Management-Blood Pressure  on??08/10/18.??Satisfied by Megan Jordan RN  ??? ? ? ?Obesity Screening on??08/10/18.??Satisfied by Megan Jordan RN  ?  ?      I was present with the resident during the history and exam.  ???  [x ] I discussed the case with the resident and agree with the findings and plan as documented in the residents note.  [ ] I discussed the case with the resident and agree with the findings and plan as documented in the residents note except:

## 2022-06-07 DIAGNOSIS — N18.9 CHRONIC KIDNEY DISEASE, UNSPECIFIED CKD STAGE: Primary | ICD-10-CM

## 2022-06-07 DIAGNOSIS — E55.9 VITAMIN D DEFICIENCY, UNSPECIFIED: ICD-10-CM

## 2022-07-02 ENCOUNTER — HOSPITAL ENCOUNTER (INPATIENT)
Facility: HOSPITAL | Age: 67
LOS: 3 days | Discharge: HOME | DRG: 388 | End: 2022-07-06
Attending: FAMILY MEDICINE | Admitting: INTERNAL MEDICINE
Payer: MEDICARE

## 2022-07-02 DIAGNOSIS — K29.71 GASTRITIS WITH HEMORRHAGE, UNSPECIFIED CHRONICITY, UNSPECIFIED GASTRITIS TYPE: Primary | ICD-10-CM

## 2022-07-02 DIAGNOSIS — E11.65 UNCONTROLLED TYPE 2 DIABETES MELLITUS WITH HYPERGLYCEMIA: ICD-10-CM

## 2022-07-02 DIAGNOSIS — K56.609 SMALL BOWEL OBSTRUCTION: ICD-10-CM

## 2022-07-02 DIAGNOSIS — N18.9 ACUTE KIDNEY INJURY SUPERIMPOSED ON CHRONIC KIDNEY DISEASE: ICD-10-CM

## 2022-07-02 DIAGNOSIS — R53.1 WEAKNESS: ICD-10-CM

## 2022-07-02 DIAGNOSIS — R10.9 ABDOMINAL PAIN: ICD-10-CM

## 2022-07-02 DIAGNOSIS — N17.9 ACUTE KIDNEY INJURY SUPERIMPOSED ON CHRONIC KIDNEY DISEASE: ICD-10-CM

## 2022-07-02 DIAGNOSIS — R07.9 CHEST PAIN: ICD-10-CM

## 2022-07-02 LAB
ABORH RETYPE: NORMAL
ALBUMIN SERPL-MCNC: 4.1 GM/DL (ref 3.4–4.8)
ALBUMIN/GLOB SERPL: 0.9 RATIO (ref 1.1–2)
ALP SERPL-CCNC: 82 UNIT/L (ref 40–150)
ALT SERPL-CCNC: 16 UNIT/L (ref 0–55)
APTT PPP: 30.7 SECONDS
AST SERPL-CCNC: 16 UNIT/L (ref 5–34)
BASOPHILS # BLD AUTO: 0.01 X10(3)/MCL (ref 0–0.2)
BASOPHILS NFR BLD AUTO: 0.1 %
BILIRUBIN DIRECT+TOT PNL SERPL-MCNC: 1 MG/DL
BUN SERPL-MCNC: 29.6 MG/DL (ref 8.4–25.7)
CALCIUM SERPL-MCNC: 10.6 MG/DL (ref 8.8–10)
CHLORIDE SERPL-SCNC: 100 MMOL/L (ref 98–107)
CK MB SERPL-MCNC: 1.9 NG/ML
CK SERPL-CCNC: 25 U/L (ref 30–200)
CO2 SERPL-SCNC: 26 MMOL/L (ref 23–31)
CREAT SERPL-MCNC: 2.27 MG/DL (ref 0.73–1.18)
EOSINOPHIL # BLD AUTO: 0.01 X10(3)/MCL (ref 0–0.9)
EOSINOPHIL NFR BLD AUTO: 0.1 %
ERYTHROCYTE [DISTWIDTH] IN BLOOD BY AUTOMATED COUNT: 13.3 % (ref 11.5–17)
GLOBULIN SER-MCNC: 4.4 GM/DL (ref 2.4–3.5)
GLUCOSE SERPL-MCNC: 217 MG/DL (ref 82–115)
GROUP & RH: NORMAL
HCT VFR BLD AUTO: 41.9 % (ref 42–52)
HGB BLD-MCNC: 13.7 GM/DL (ref 14–18)
IMM GRANULOCYTES # BLD AUTO: 0.07 X10(3)/MCL (ref 0–0.04)
IMM GRANULOCYTES NFR BLD AUTO: 0.6 %
INDIRECT COOMBS GEL: NORMAL
LIPASE SERPL-CCNC: 23 U/L
LYMPHOCYTES # BLD AUTO: 0.89 X10(3)/MCL (ref 0.6–4.6)
LYMPHOCYTES NFR BLD AUTO: 7.2 %
MCH RBC QN AUTO: 29.4 PG (ref 27–31)
MCHC RBC AUTO-ENTMCNC: 32.7 MG/DL (ref 33–36)
MCV RBC AUTO: 89.9 FL (ref 80–94)
MONOCYTES # BLD AUTO: 0.44 X10(3)/MCL (ref 0.1–1.3)
MONOCYTES NFR BLD AUTO: 3.6 %
NEUTROPHILS # BLD AUTO: 10.9 X10(3)/MCL (ref 2.1–9.2)
NEUTROPHILS NFR BLD AUTO: 88.4 %
NRBC BLD AUTO-RTO: 0 %
PLATELET # BLD AUTO: 355 X10(3)/MCL (ref 130–400)
PMV BLD AUTO: 9.5 FL (ref 7.4–10.4)
POTASSIUM SERPL-SCNC: 4.4 MMOL/L (ref 3.5–5.1)
PROT SERPL-MCNC: 8.5 GM/DL (ref 5.8–7.6)
RBC # BLD AUTO: 4.66 X10(6)/MCL (ref 4.7–6.1)
SODIUM SERPL-SCNC: 141 MMOL/L (ref 136–145)
TROPONIN I SERPL-MCNC: 0.02 NG/ML (ref 0–0.04)
WBC # SPEC AUTO: 12.3 X10(3)/MCL (ref 4.5–11.5)

## 2022-07-02 PROCEDURE — 93005 ELECTROCARDIOGRAM TRACING: CPT

## 2022-07-02 PROCEDURE — 96374 THER/PROPH/DIAG INJ IV PUSH: CPT

## 2022-07-02 PROCEDURE — 85730 THROMBOPLASTIN TIME PARTIAL: CPT | Performed by: FAMILY MEDICINE

## 2022-07-02 PROCEDURE — 36415 COLL VENOUS BLD VENIPUNCTURE: CPT | Performed by: FAMILY MEDICINE

## 2022-07-02 PROCEDURE — 85025 COMPLETE CBC W/AUTO DIFF WBC: CPT | Performed by: FAMILY MEDICINE

## 2022-07-02 PROCEDURE — 25000003 PHARM REV CODE 250: Performed by: INTERNAL MEDICINE

## 2022-07-02 PROCEDURE — 96376 TX/PRO/DX INJ SAME DRUG ADON: CPT

## 2022-07-02 PROCEDURE — 99285 EMERGENCY DEPT VISIT HI MDM: CPT | Mod: 25

## 2022-07-02 PROCEDURE — 96361 HYDRATE IV INFUSION ADD-ON: CPT

## 2022-07-02 PROCEDURE — 84484 ASSAY OF TROPONIN QUANT: CPT | Performed by: FAMILY MEDICINE

## 2022-07-02 PROCEDURE — 86901 BLOOD TYPING SEROLOGIC RH(D): CPT | Performed by: FAMILY MEDICINE

## 2022-07-02 PROCEDURE — 63600175 PHARM REV CODE 636 W HCPCS: Performed by: FAMILY MEDICINE

## 2022-07-02 PROCEDURE — 25000003 PHARM REV CODE 250: Performed by: FAMILY MEDICINE

## 2022-07-02 PROCEDURE — 85610 PROTHROMBIN TIME: CPT | Performed by: FAMILY MEDICINE

## 2022-07-02 PROCEDURE — 80053 COMPREHEN METABOLIC PANEL: CPT | Performed by: FAMILY MEDICINE

## 2022-07-02 PROCEDURE — 82550 ASSAY OF CK (CPK): CPT | Performed by: FAMILY MEDICINE

## 2022-07-02 PROCEDURE — 83690 ASSAY OF LIPASE: CPT | Performed by: FAMILY MEDICINE

## 2022-07-02 PROCEDURE — 82553 CREATINE MB FRACTION: CPT | Performed by: FAMILY MEDICINE

## 2022-07-02 RX ORDER — SODIUM CHLORIDE 9 MG/ML
1000 INJECTION, SOLUTION INTRAVENOUS
Status: COMPLETED | OUTPATIENT
Start: 2022-07-02 | End: 2022-07-02

## 2022-07-02 RX ORDER — ONDANSETRON 2 MG/ML
4 INJECTION INTRAMUSCULAR; INTRAVENOUS
Status: COMPLETED | OUTPATIENT
Start: 2022-07-02 | End: 2022-07-02

## 2022-07-02 RX ORDER — SODIUM CHLORIDE, SODIUM LACTATE, POTASSIUM CHLORIDE, CALCIUM CHLORIDE 600; 310; 30; 20 MG/100ML; MG/100ML; MG/100ML; MG/100ML
500 INJECTION, SOLUTION INTRAVENOUS
Status: COMPLETED | OUTPATIENT
Start: 2022-07-02 | End: 2022-07-03

## 2022-07-02 RX ORDER — SUCRALFATE 1 G/10ML
1 SUSPENSION ORAL
Status: COMPLETED | OUTPATIENT
Start: 2022-07-02 | End: 2022-07-02

## 2022-07-02 RX ADMIN — SUCRALFATE 1 G: 1 SUSPENSION ORAL at 11:07

## 2022-07-02 RX ADMIN — ONDANSETRON 4 MG: 2 INJECTION INTRAMUSCULAR; INTRAVENOUS at 08:07

## 2022-07-02 RX ADMIN — SODIUM CHLORIDE 1000 ML: 9 INJECTION, SOLUTION INTRAVENOUS at 08:07

## 2022-07-03 LAB
ALBUMIN SERPL-MCNC: 3.8 GM/DL (ref 3.4–4.8)
ALBUMIN/GLOB SERPL: 1.2 RATIO (ref 1.1–2)
ALP SERPL-CCNC: 77 UNIT/L (ref 40–150)
ALT SERPL-CCNC: 11 UNIT/L (ref 0–55)
AST SERPL-CCNC: 16 UNIT/L (ref 5–34)
BILIRUBIN DIRECT+TOT PNL SERPL-MCNC: 0.9 MG/DL
BUN SERPL-MCNC: 29.8 MG/DL (ref 8.4–25.7)
CALCIUM SERPL-MCNC: 9.9 MG/DL (ref 8.8–10)
CHLORIDE SERPL-SCNC: 104 MMOL/L (ref 98–107)
CO2 SERPL-SCNC: 23 MMOL/L (ref 23–31)
CREAT SERPL-MCNC: 1.89 MG/DL (ref 0.73–1.18)
GLOBULIN SER-MCNC: 3.2 GM/DL (ref 2.4–3.5)
GLUCOSE SERPL-MCNC: 163 MG/DL (ref 82–115)
LACTATE SERPL-SCNC: 1.1 MMOL/L (ref 0.5–2.2)
MAGNESIUM SERPL-MCNC: 1.7 MG/DL (ref 1.6–2.6)
PHOSPHATE SERPL-MCNC: 3 MG/DL (ref 2.3–4.7)
POCT GLUCOSE: 141 MG/DL (ref 70–110)
POCT GLUCOSE: 157 MG/DL (ref 70–110)
POCT GLUCOSE: 159 MG/DL (ref 70–110)
POTASSIUM SERPL-SCNC: 4.6 MMOL/L (ref 3.5–5.1)
PROT SERPL-MCNC: 7 GM/DL (ref 5.8–7.6)
SARS-COV-2 RDRP RESP QL NAA+PROBE: NEGATIVE
SODIUM SERPL-SCNC: 141 MMOL/L (ref 136–145)

## 2022-07-03 PROCEDURE — 63600175 PHARM REV CODE 636 W HCPCS: Performed by: STUDENT IN AN ORGANIZED HEALTH CARE EDUCATION/TRAINING PROGRAM

## 2022-07-03 PROCEDURE — 25000003 PHARM REV CODE 250: Performed by: STUDENT IN AN ORGANIZED HEALTH CARE EDUCATION/TRAINING PROGRAM

## 2022-07-03 PROCEDURE — 36415 COLL VENOUS BLD VENIPUNCTURE: CPT | Performed by: INTERNAL MEDICINE

## 2022-07-03 PROCEDURE — 84100 ASSAY OF PHOSPHORUS: CPT | Performed by: INTERNAL MEDICINE

## 2022-07-03 PROCEDURE — 83605 ASSAY OF LACTIC ACID: CPT | Performed by: INTERNAL MEDICINE

## 2022-07-03 PROCEDURE — 80053 COMPREHEN METABOLIC PANEL: CPT | Performed by: INTERNAL MEDICINE

## 2022-07-03 PROCEDURE — 25500020 PHARM REV CODE 255

## 2022-07-03 PROCEDURE — 63600175 PHARM REV CODE 636 W HCPCS: Performed by: INTERNAL MEDICINE

## 2022-07-03 PROCEDURE — 21400001 HC TELEMETRY ROOM

## 2022-07-03 PROCEDURE — 87635 SARS-COV-2 COVID-19 AMP PRB: CPT | Performed by: STUDENT IN AN ORGANIZED HEALTH CARE EDUCATION/TRAINING PROGRAM

## 2022-07-03 PROCEDURE — 83735 ASSAY OF MAGNESIUM: CPT | Performed by: INTERNAL MEDICINE

## 2022-07-03 PROCEDURE — 97162 PT EVAL MOD COMPLEX 30 MIN: CPT

## 2022-07-03 PROCEDURE — C9113 INJ PANTOPRAZOLE SODIUM, VIA: HCPCS | Performed by: STUDENT IN AN ORGANIZED HEALTH CARE EDUCATION/TRAINING PROGRAM

## 2022-07-03 PROCEDURE — 11000001 HC ACUTE MED/SURG PRIVATE ROOM

## 2022-07-03 RX ORDER — AMLODIPINE BESYLATE 10 MG/1
10 TABLET ORAL DAILY
Status: DISCONTINUED | OUTPATIENT
Start: 2022-07-03 | End: 2022-07-03

## 2022-07-03 RX ORDER — PANTOPRAZOLE SODIUM 40 MG/10ML
40 INJECTION, POWDER, LYOPHILIZED, FOR SOLUTION INTRAVENOUS EVERY 24 HOURS
Status: DISCONTINUED | OUTPATIENT
Start: 2022-07-03 | End: 2022-07-06 | Stop reason: HOSPADM

## 2022-07-03 RX ORDER — IBUPROFEN 200 MG
24 TABLET ORAL
Status: DISCONTINUED | OUTPATIENT
Start: 2022-07-03 | End: 2022-07-06 | Stop reason: HOSPADM

## 2022-07-03 RX ORDER — GLUCAGON 1 MG
1 KIT INJECTION
Status: DISCONTINUED | OUTPATIENT
Start: 2022-07-03 | End: 2022-07-03

## 2022-07-03 RX ORDER — ONDANSETRON 2 MG/ML
8 INJECTION INTRAMUSCULAR; INTRAVENOUS EVERY 6 HOURS PRN
Status: DISCONTINUED | OUTPATIENT
Start: 2022-07-03 | End: 2022-07-06 | Stop reason: HOSPADM

## 2022-07-03 RX ORDER — HYDRALAZINE HYDROCHLORIDE 20 MG/ML
10 INJECTION INTRAMUSCULAR; INTRAVENOUS
Status: DISCONTINUED | OUTPATIENT
Start: 2022-07-03 | End: 2022-07-06 | Stop reason: HOSPADM

## 2022-07-03 RX ORDER — ONDANSETRON 2 MG/ML
4 INJECTION INTRAMUSCULAR; INTRAVENOUS
Status: COMPLETED | OUTPATIENT
Start: 2022-07-03 | End: 2022-07-03

## 2022-07-03 RX ORDER — GLUCAGON 1 MG
1 KIT INJECTION
Status: DISCONTINUED | OUTPATIENT
Start: 2022-07-03 | End: 2022-07-06 | Stop reason: HOSPADM

## 2022-07-03 RX ORDER — IBUPROFEN 200 MG
16 TABLET ORAL
Status: DISCONTINUED | OUTPATIENT
Start: 2022-07-03 | End: 2022-07-06 | Stop reason: HOSPADM

## 2022-07-03 RX ORDER — ATORVASTATIN CALCIUM 10 MG/1
10 TABLET, FILM COATED ORAL DAILY
Status: DISCONTINUED | OUTPATIENT
Start: 2022-07-03 | End: 2022-07-03

## 2022-07-03 RX ORDER — HYDRALAZINE HYDROCHLORIDE 20 MG/ML
10 INJECTION INTRAMUSCULAR; INTRAVENOUS
Status: DISCONTINUED | OUTPATIENT
Start: 2022-07-03 | End: 2022-07-03

## 2022-07-03 RX ORDER — INSULIN ASPART 100 [IU]/ML
1-10 INJECTION, SOLUTION INTRAVENOUS; SUBCUTANEOUS EVERY 6 HOURS PRN
Status: DISCONTINUED | OUTPATIENT
Start: 2022-07-03 | End: 2022-07-06 | Stop reason: HOSPADM

## 2022-07-03 RX ORDER — LABETALOL HCL 20 MG/4 ML
20 SYRINGE (ML) INTRAVENOUS EVERY 6 HOURS
Status: DISCONTINUED | OUTPATIENT
Start: 2022-07-03 | End: 2022-07-06 | Stop reason: HOSPADM

## 2022-07-03 RX ORDER — HYDRALAZINE HYDROCHLORIDE 20 MG/ML
10 INJECTION INTRAMUSCULAR; INTRAVENOUS EVERY 6 HOURS PRN
Status: DISCONTINUED | OUTPATIENT
Start: 2022-07-03 | End: 2022-07-03

## 2022-07-03 RX ORDER — NALOXONE HCL 0.4 MG/ML
0.02 VIAL (ML) INJECTION
Status: DISCONTINUED | OUTPATIENT
Start: 2022-07-03 | End: 2022-07-06 | Stop reason: HOSPADM

## 2022-07-03 RX ORDER — SODIUM CHLORIDE, SODIUM LACTATE, POTASSIUM CHLORIDE, CALCIUM CHLORIDE 600; 310; 30; 20 MG/100ML; MG/100ML; MG/100ML; MG/100ML
1000 INJECTION, SOLUTION INTRAVENOUS
Status: DISCONTINUED | OUTPATIENT
Start: 2022-07-03 | End: 2022-07-03

## 2022-07-03 RX ORDER — SODIUM CHLORIDE, SODIUM LACTATE, POTASSIUM CHLORIDE, CALCIUM CHLORIDE 600; 310; 30; 20 MG/100ML; MG/100ML; MG/100ML; MG/100ML
INJECTION, SOLUTION INTRAVENOUS CONTINUOUS
Status: DISCONTINUED | OUTPATIENT
Start: 2022-07-03 | End: 2022-07-06 | Stop reason: HOSPADM

## 2022-07-03 RX ORDER — SODIUM CHLORIDE 0.9 % (FLUSH) 0.9 %
10 SYRINGE (ML) INJECTION EVERY 12 HOURS PRN
Status: DISCONTINUED | OUTPATIENT
Start: 2022-07-03 | End: 2022-07-06 | Stop reason: HOSPADM

## 2022-07-03 RX ORDER — METOPROLOL SUCCINATE 25 MG/1
100 TABLET, EXTENDED RELEASE ORAL DAILY
Status: DISCONTINUED | OUTPATIENT
Start: 2022-07-03 | End: 2022-07-03

## 2022-07-03 RX ADMIN — SODIUM CHLORIDE, POTASSIUM CHLORIDE, SODIUM LACTATE AND CALCIUM CHLORIDE: 600; 310; 30; 20 INJECTION, SOLUTION INTRAVENOUS at 04:07

## 2022-07-03 RX ADMIN — PIPERACILLIN SODIUM AND TAZOBACTAM SODIUM 4.5 G: 4; .5 INJECTION, POWDER, LYOPHILIZED, FOR SOLUTION INTRAVENOUS at 08:07

## 2022-07-03 RX ADMIN — SODIUM CHLORIDE, POTASSIUM CHLORIDE, SODIUM LACTATE AND CALCIUM CHLORIDE 500 ML: 600; 310; 30; 20 INJECTION, SOLUTION INTRAVENOUS at 12:07

## 2022-07-03 RX ADMIN — SODIUM CHLORIDE, POTASSIUM CHLORIDE, SODIUM LACTATE AND CALCIUM CHLORIDE: 600; 310; 30; 20 INJECTION, SOLUTION INTRAVENOUS at 09:07

## 2022-07-03 RX ADMIN — PANTOPRAZOLE SODIUM 40 MG: 40 INJECTION, POWDER, FOR SOLUTION INTRAVENOUS at 09:07

## 2022-07-03 RX ADMIN — DIATRIZOATE MEGLUMINE AND DIATRIZOATE SODIUM 120 ML: 660; 100 LIQUID ORAL; RECTAL at 01:07

## 2022-07-03 RX ADMIN — HYDRALAZINE HYDROCHLORIDE 10 MG: 20 INJECTION, SOLUTION INTRAMUSCULAR; INTRAVENOUS at 04:07

## 2022-07-03 RX ADMIN — HYDRALAZINE HYDROCHLORIDE 10 MG: 20 INJECTION, SOLUTION INTRAMUSCULAR; INTRAVENOUS at 12:07

## 2022-07-03 RX ADMIN — PIPERACILLIN SODIUM AND TAZOBACTAM SODIUM 4.5 G: 4; .5 INJECTION, POWDER, LYOPHILIZED, FOR SOLUTION INTRAVENOUS at 12:07

## 2022-07-03 RX ADMIN — SODIUM CHLORIDE, POTASSIUM CHLORIDE, SODIUM LACTATE AND CALCIUM CHLORIDE: 600; 310; 30; 20 INJECTION, SOLUTION INTRAVENOUS at 05:07

## 2022-07-03 RX ADMIN — ONDANSETRON 8 MG: 2 INJECTION INTRAMUSCULAR; INTRAVENOUS at 05:07

## 2022-07-03 RX ADMIN — ONDANSETRON 4 MG: 2 INJECTION INTRAMUSCULAR; INTRAVENOUS at 01:07

## 2022-07-03 RX ADMIN — PIPERACILLIN SODIUM AND TAZOBACTAM SODIUM 4.5 G: 4; .5 INJECTION, POWDER, LYOPHILIZED, FOR SOLUTION INTRAVENOUS at 05:07

## 2022-07-03 NOTE — PT/OT/SLP EVAL
Physical Therapy Evaluation    Patient Name:  Omer Booker   MRN:  [id number]    Recommendations:     Discharge Recommendations:  home with home health   Discharge Equipment Recommendations: walker, rolling, other (see comments) (pt needs a new RW; current RW > 6 years old)   Barriers to discharge: severity of deficits    Assessment:     Omer Booker is a 66 y.o. male admitted with a medical diagnosis of gastritis, weakness, N/V.  He presents with the following impairments/functional limitations:  weakness, impaired endurance, impaired self care skills, impaired functional mobilty, gait instability, impaired balance .    Rehab Prognosis: Good; patient would benefit from acute skilled PT services to address these deficits and reach maximum level of function.    Recent Surgery: * No surgery found *      Plan:     During this hospitalization, patient to be seen  (3-5x/wk) to address the identified rehab impairments via   and progress toward the following goals:    · Plan of Care Expires:  08/03/22    Subjective     Chief Complaint: weakness  Patient/Family Comments/goals: return home  Pain/Comfort:  · Pain Rating 1: 3/10  · Location - Orientation 1: upper  · Location 1: abdomen  · Pain Addressed 1: Nurse notified    Patients cultural, spiritual, Roman Catholic conflicts given the current situation: no    Living Environment:  Lives in a single level home with his wife and daughter.  He has a ramp installed.  Prior to admission, patients level of function was needed assistance for ADL's and functional mobility.  Equipment used at home: bedside commode, walker, rolling, wheelchair.  DME owned : rolling walker, bedside commode and wheelchair.  Upon discharge, patient will have assistance from wife and daughter.    Objective:     Communicated with nurse Vee prior to session.  Patient found supine with SCD, telemetry, peripheral IV  upon PT entry to room.    General Precautions: Standard, fall   Orthopedic Precautions:  (Pt has difficulty standing from low surfaces; raise bed to stand)   Braces: N/A  Respiratory Status: Room air    Exams:  · Cognitive Exam:  Patient is oriented to Person, Place, Time and Situation  · Sensation:    · -       Intact  · RUE ROM: WFL  · RUE Strength: WFL  · LUE ROM: WFL  · LUE Strength: WFL  · RLE ROM: WFL  · RLE Strength: WFL  · LLE ROM: WFL  · LLE Strength: WFL    Functional Mobility:  · Bed Mobility:     · Rolling Right: minimum assistance  · Supine to Sit: minimum assistance  · Sit to Supine: minimum assistance  · Transfers:     · Sit to Stand:  minimum assistance with rolling walker and bed raised  · Gait: 5 feet in room Min A wit RW  · Balance: fair      Patient left HOB elevated with all lines intact, call button in reach, nurse Nanda  notified and wife present.    GOALS:   Multidisciplinary Problems     Physical Therapy Goals        Problem: Physical Therapy    Goal Priority Disciplines Outcome Goal Variances Interventions   Physical Therapy Goal     PT, PT/OT      Description: Goals to be met by: Discharge     Patient will increase functional independence with mobility by performin. Supine to sit with Stand-by Assistance  2. Sit to supine with Stand-by Assistance  3. Sit to stand transfer with Stand-by Assistance  4. Gait  x 50 feet with Stand-by Assistance using Rolling Walker.                      History:     History reviewed. No pertinent past medical history.    No past surgical history on file.    Time Tracking:     PT Received On: 22  PT Start Time: 940     PT Stop Time: 1001  PT Total Time (min): 21 min     Billable Minutes: Evaluation 2022

## 2022-07-03 NOTE — CONSULTS
[facility] General Surgery Service  ADMIT / CONSULT / H&P NOTE  Date: 7/3/2022    CC:   Omer Booker is a 66 y.o. male presenting with weakness, nausea, and vomiting    HPI:   Pt is a 66 y.o man with a PMH of Afib on eliquis, HTN, DM, HLD,  stage 3 kidney disease, and Lap isai in 2016 who presented to the ER with complaints of weakness and n/v that began one day ago. Reports he woke up at 3 AM on 7/2 with abdominal pain and began vomiting around 6 AM.  Vomited 5-6 times throughout the day. Pt reports vomit was yellow/light brown after first but became dark brown/black the more he vomited. Last vomited around 6PM last night. Patient reports having two bowel movements yesterday and was passing gas, denies diarrhea or constipation. Last BM was yesterday afternoon. Denies chest pain, SOB, fever, or chills. Last took his Eliquis on the morning of 7/1.     PMH:   A-fib on eliquis  HTN  DVT  HLD  DM  CKD 3    PSH:   Lap Isai 2016  Bronchoscopy 2016      FamHx:   History reviewed. No pertinent family history.    SocHx:  Social History     Socioeconomic History    Marital status:        Allergies:   Review of patient's allergies indicates:  No Known Allergies    Medications:  No current facility-administered medications on file prior to encounter.     No current outpatient medications on file prior to encounter.         ROS:   Gen: Denies any weight change, fatigue, fever, or chills  Skin: No new rashes or other skin changes  Head: No new headaches  Eyes: Denies any recent changes in vision or blurry vision  Ears: Denies any changes in hearing, tinnitus, or vertigo  Throat: Denies any dysphagia or hoarseness  Cardiac: Denies any orthopnea or palpitations  Resp: Denies any cough, wheezing, or shortness of breath  GI: As above  Urinary: Denies any pain or difficulty urinating  Neuro: No pain or tingling in extremities.  No new onset weakness.  Psychiatric: No changes in mood or memory    Objective:    VITAL SIGNS: 24 HR  "MIN & MAX LAST    Temp  Min: 98.1 °F (36.7 °C)  Max: 98.1 °F (36.7 °C)  98.1 °F (36.7 °C)        BP  Min: 72/52  Max: 160/99  (!) 155/94     Pulse  Min: 79  Max: 106  79     Resp  Min: 15  Max: 22  16    SpO2  Min: 96 %  Max: 100 %  98 %      HT: 5' 10" (177.8 cm)  WT: 103.4 kg (228 lb 1.1 oz)  BMI: 32.7       Physical Exam:  General: NAD  HEENT: NCAT  Cardio: Irregularly irregular  rhythm, 2+ radial  Resp: No increased WOB, satting well on RA  Abd: Soft, mildly distended, non-tender, tympanic    Results  Recent Labs   Lab 07/01/22  1215 07/02/22  2030 07/02/22  2031   WBC 5.0 12.3*  --    HGB 12.2* 13.7*  --    HCT 37.3* 41.9*  --     355  --    INR  --   --  1.16   * 141  --    CHLORIDE 102 100  --    CO2 25 26  --    BUN 24.0 29.6*  --    CREATININE 1.95* 2.27*  --    GLUCOSE 125* 217*  --    CALCIUM 9.7 10.6*  --    MG 1.80  --   --    PHOS 2.4  --   --    ALKPHOS 79 82  --    LIPASE  --  23  --        Imaging:   CT Abdomen Pelvis W/out contrast    Initial Impression:  1. There is pronounced the location of nearly the entire small bowel with a relatively rapid transition in the distal/terminal ileum centered on images 57 - 64. This is suggestive of distal mechanical small bowel obstruction. Correlate with clinical and laboratory findings as regards additional evaluation and follow-up.  2. Details and other findings as discussed above.       A/P:   66 y.o. male with a PMH of Afib on eliquis, HTN, DM, kidney disease, and Lap isai in 2016 who presented to the ER with complaints of weakness and n/v, SBO with transition point in distal/terminal ileum seen on CT.     Admit to medicine   NPO, fluid resuscitation   We will order KUB in a few hours to assess progression of PO contrast  Recommend NGT if patients begins vomiting again      Maria Maradiaga MD   [employer] General Surgery PGY1          Agree w above. NGT if vomits. Will consider SBFT if does not open up in the next 24-48hrs.    Mike Stephens, " MD

## 2022-07-03 NOTE — SIGNIFICANT EVENT
BP elevated. Giving his prn Hydralazine. Starting home aldactone, but patient can't hold down oral meds. Spoke with primary team who put in scheduled IV labetalol. Will add hold parameters.    Evna Chairez PGY 2

## 2022-07-03 NOTE — ED PROVIDER NOTES
Encounter Date: 7/2/2022       History     Chief Complaint   Patient presents with    Weakness    Vomiting     Pt presents to ed with reports of weakness with n/v starting late last night. Pt reports abd pain and vomiting black liquid. Pt currently hypotensive and escorted to room 3 at this time.      66-year-old white male with history of AFib on Eliquis, hypertension, diabetes, kidney disease presents to the ER with complaint of weakness, nausea vomiting onset 1 day ago.  Patient reports that he was enjoying life and eating bad which included eating boudin, pizza, candy bars  he reports this morning he started not feeling good and started vomiting.  Patient denies vomiting black liquid to MD.  Patient reports that the color was yellowish-Light brownish vomit.  Patient denies chest pain, shortness of breath, fever, chills, headache, dizziness.  Spouse at bedside reports that patient vomited what looked like black coffee.    Reviewed triage note.  Reviewed triage vital signs.  Upon arrival to ED bed patient's blood pressure increased to systolic 129 without any intervention.  Patient is currently awake alert, oriented x4.  Patient reports his blood pressure drops when he changes positions     The history is provided by the patient and the spouse. No  was used.     Review of patient's allergies indicates:  No Known Allergies  History reviewed. No pertinent past medical history.  No past surgical history on file.  History reviewed. No pertinent family history.     Review of Systems   HENT: Negative for sore throat.    Respiratory: Negative for shortness of breath.    Cardiovascular: Negative for chest pain and leg swelling.   Gastrointestinal: Positive for nausea. Negative for constipation.   Genitourinary: Negative for dysuria.   Musculoskeletal: Negative for back pain.   Skin: Negative for rash.   Neurological: Positive for weakness. Negative for dizziness.   Hematological: Does not  bruise/bleed easily.   All other systems reviewed and are negative.      Physical Exam     Initial Vitals [07/02/22 1959]   BP Pulse Resp Temp SpO2   (!) 72/52 106 (!) 22 98.1 °F (36.7 °C) 100 %      MAP       --         Physical Exam    Nursing note and vitals reviewed.  Constitutional: He appears well-developed and well-nourished. No distress.   HENT:   Head: Normocephalic and atraumatic.   Mouth/Throat: Oropharynx is clear and moist.   Eyes: Conjunctivae are normal.   Cardiovascular: Regular rhythm.   Pulmonary/Chest: Breath sounds normal.   Abdominal: Abdomen is soft. Bowel sounds are normal. He exhibits no distension. There is no abdominal tenderness. There is no rebound and no guarding.   Musculoskeletal:         General: Normal range of motion.     Neurological: He is alert and oriented to person, place, and time. He has normal strength. GCS score is 15. GCS eye subscore is 4. GCS verbal subscore is 5. GCS motor subscore is 6.   Skin: Skin is warm and dry.   Psychiatric: He has a normal mood and affect. His behavior is normal. Judgment and thought content normal.         ED Course   Procedures  Labs Reviewed   COMPREHENSIVE METABOLIC PANEL - Abnormal; Notable for the following components:       Result Value    Glucose Level 217 (*)     Blood Urea Nitrogen 29.6 (*)     Creatinine 2.27 (*)     Calcium Level Total 10.6 (*)     Protein Total 8.5 (*)     Globulin 4.4 (*)     Albumin/Globulin Ratio 0.9 (*)     All other components within normal limits   CBC WITH DIFFERENTIAL - Abnormal; Notable for the following components:    WBC 12.3 (*)     RBC 4.66 (*)     Hgb 13.7 (*)     Hct 41.9 (*)     MCHC 32.7 (*)     Neut # 10.9 (*)     IG# 0.07 (*)     All other components within normal limits   CK - Abnormal; Notable for the following components:    Creatine Kinase 25 (*)     All other components within normal limits   APTT - Normal   LIPASE - Normal   TROPONIN I - Normal   CK-MB - Normal   CBC W/ AUTO DIFFERENTIAL     Narrative:     The following orders were created for panel order CBC auto differential.  Procedure                               Abnormality         Status                     ---------                               -----------         ------                     CBC with Differential[[id number]]        Abnormal            Final result                 Please view results for these tests on the individual orders.   PROTIME-INR   EXTRA TUBES    Narrative:     The following orders were created for panel order EXTRA TUBES.  Procedure                               Abnormality         Status                     ---------                               -----------         ------                     Gold Top Hold[[id number]]                                    In process                   Please view results for these tests on the individual orders.   GOLD TOP HOLD   LACTIC ACID, PLASMA   COMPREHENSIVE METABOLIC PANEL   MAGNESIUM   PHOSPHORUS   TYPE & SCREEN   ABORH RETYPE     EKG Readings: (Independently Interpreted)   Rhythm: Normal Sinus Rhythm. Heart Rate: 86. Ectopy: No Ectopy. Conduction: 1st Degree AV Block. ST Segments: Normal ST Segments.       Imaging Results          CT Abdomen Pelvis  Without Contrast (Preliminary result)  Result time 07/03/22 03:26:57    Preliminary result by Paul Mir MD (07/03/22 03:26:57)                 Narrative:    START OF REPORT:  Technique: CT of the abdomen and pelvis was performed with axial images as well as sagittal and coronal reconstruction images without intravenous contrast or oral contrast.    Comparison: None available.    Clinical History: Pt presents to ed with reports of weakness with n/v starting late last night. Pt reports abd pain and vomiting black liquid.    Dosage Information: Automated Exposure Control was utilized.    Findings:  Lines and Tubes: None.  Thorax: The right diaphragm is elevated.  Lungs: Streaky opacities are seen in the right middle lobe, lingula and  bilateral lower lobes, which may reflect subsegmental atelectasis versus scarring.  Pleura: No effusions or thickening.  Heart: The heart size is within normal limits.  Abdomen:  Abdominal Wall: No abdominal wall pathology is seen.  Liver: The liver appears unremarkable.  Biliary System: No extrahepatic biliary duct dilatation is seen.  Gallbladder: Surgical clips are seen in the gallbladder fossa consistent with prior cholecystectomy.  Pancreas: The pancreas appears unremarkable.  Spleen: The spleen appears unremarkable.  Adrenals: The adrenal glands appear unremarkable.  Kidneys: The right kidney is small in size when compared to the left. There is a small left renal parapelvic cyst. There are 2-3 mm nonobstructing bilateral renal calculi. No ureteric calculus or ureteral obstruction. The kidneys otherwise appear unremarkable.  Aorta: There is mild calcification of the abdominal aorta and its branches.  IVC: Unremarkable.  Bowel:  Esophagus: The visualized esophagus appears unremarkable.  Stomach: The stomach appears unremarkable.  Duodenum: Unremarkable appearing duodenum.  Small Bowel: There is pronounced the location of nearly the entire small bowel with a relatively rapid transition in the distal/terminal ileum centered on images 57 - 64. Correlate with clinical and laboratory findings as regards additional evaluation and follow-up.  Colon: The colon is essentially completely decompressed. There are multiple colonic diverticula without evidence of diverticulitis.  Appendix: No appendix is identified.  Peritoneum: No intraperitoneal free air or ascites is seen.    Pelvis:  Bladder: The bladder appears unremarkable.  Male:  Prostate gland: The prostate gland appears unremarkable.  Inguinal Findings:  Inguinal Hernia: Incidental note is made of small uncomplicated mesenteric fat containing bilateral inguinal hernias.    Bony structures:  Dorsal Spine: There is spondylosis of the visualized dorsal  spine.      Impression:  1. There is pronounced the location of nearly the entire small bowel with a relatively rapid transition in the distal/terminal ileum centered on images 57 - 64. This is suggestive of distal mechanical small bowel obstruction. Correlate with clinical and laboratory findings as regards additional evaluation and follow-up.  2. Details and other findings as discussed above.                                 X-Ray Chest 1 View (Final result)  Result time 07/02/22 21:17:23    Final result by Marco A Stern MD (07/02/22 21:17:23)                 Impression:      NO ACUTE CARDIOPULMONARY PROCESS IDENTIFIED.      Electronically signed by: Marco A Stern  Date:    07/02/2022  Time:    21:17             Narrative:    EXAMINATION:  XR CHEST 1 VIEW    CLINICAL HISTORY:  abdominal pain;    TECHNIQUE:  One view    COMPARISON:  December 3, 2006.    FINDINGS:  Cardiopericardial silhouette appears mildly enlarged on this portable radiograph.  Lungs are of low volume without dense focal or segmental consolidation, congestive process, pleural effusions or pneumothorax.                               X-Ray Abdomen Flat And Erect (In process)               X-Rays:   Independently Interpreted Readings:   Abdomen: No free air under diaphragm. Small bowel distention suggestive ileus     Medications   barium sulfate (READI-CAT) 2 % (w/v) suspension (has no administration in time range)   lactated ringers infusion (has no administration in time range)   0.9%  NaCl infusion (0 mLs Intravenous Stopped 7/2/22 2339)   ondansetron injection 4 mg (4 mg Intravenous Given 7/2/22 2027)   lactated ringers infusion (0 mLs Intravenous Stopped 7/3/22 0119)   sucralfate 100 mg/mL suspension 1 g (1 g Oral Given 7/2/22 2333)   ondansetron injection 4 mg (4 mg Intravenous Given 7/3/22 0151)                 ED Course as of 07/03/22 0400   Sat Jul 02, 2022   2319 Patient re-examined multiple times in ED.  patient with no vomiting in the ED.   vital signs reviewed.  Patient's resting comfortably in hospital bed.  Labs pending.  Will transition care to Dr. Juan at this time. [AK]      ED Course User Index  [AK] Van Castro MD         01:00 AM :  During re-evaluation of the patient he states feeling better. Abdominal exam reveal distention but non tenderness; labs with acute on chronic kidney failure, hyperglycemia an leukocytosis. Abd X Ray concerning for ileus. CT with PO contrast ordered for possible SBO despite pt tolerating water intake. Will F/U    03:45AM:  CT concerning for SBO, mechanical; for which will consult surgery. Due to multiple medical conditions, not controlled at this time, will consult hospital medicine for admission with surgery services as consultants. Pt stable for admission. Will continue hydration therapy and will keep patient NPO.     Clinical Impression:   Final diagnoses:  [R10.9] Abdominal pain  [R53.1] Weakness  [K29.71] Gastritis with hemorrhage, unspecified chronicity, unspecified gastritis type (Primary)  [K56.609] Small bowel obstruction  [N17.9, N18.9] Acute kidney injury superimposed on chronic kidney disease  [E11.65] Uncontrolled type 2 diabetes mellitus with hyperglycemia          ED Disposition Condition    Observation               Fadi Goel MD  07/03/22 [id number]

## 2022-07-03 NOTE — H&P
"[facility] History & Physical Note     Resident Team: [facility] List [facility]  Attending Physician: Dr. Fuentes  Resident: Dr. Irby    Date of Admit: 2022    Chief Complaint     Weakness and Vomiting (Pt presents to ed with reports of weakness with n/v starting late last night. Pt reports abd pain and vomiting black liquid. Pt currently hypotensive and escorted to room 3 at this time. )      Subjective:      History of Present Illness:  Omer Booker is a 66 y.o. male who with a history of CKD 3, heart failure EF 45%, paroxysmal A. Fib, HTN, DM who presented to the ED on 2022  with a primary complaint of Weakness and Vomiting (Pt presents to ed with reports of weakness with n/v starting late last night. Pt reports abd pain and vomiting black liquid. Pt currently hypotensive and escorted to room 3 at this time. )  Patient reports he started vomiting around 6:00 am yesterday morning. Reports five episodes of black emesis, nonbloody. He also reports diffuse abdominal pain without  any radiation. Patient did not take anything for the vomiting or abdominal pain. He had two BM and is also passing gas appropriately. He denies any prior similar episodes. He denies fever, chills, diaphoresis, headache, chest pain.       Past Medical History:  History reviewed. No pertinent past medical history.    Past Surgical History:  No past surgical history on file.    Family History:  History reviewed. No pertinent family history.    Social History:       Allergies:  Review of patient's allergies indicates:  No Known Allergies    Home Medications:  Prior to Admission medications    Not on File         Review of Systems:  See HPI         Objective:   Last 24 Hour Vital Signs:  BP  Min: 72/52  Max: 160/99  Temp  Av.1 °F (36.7 °C)  Min: 98.1 °F (36.7 °C)  Max: 98.1 °F (36.7 °C)  Pulse  Av.9  Min: 79  Max: 106  Resp  Av.1  Min: 15  Max: 22  SpO2  Av.8 %  Min: 96 %  Max: 100 %  Height  Av' 10" (177.8 " "cm)  Min: 5' 10" (177.8 cm)  Max: 5' 10" (177.8 cm)  Weight  Av.4 kg (228 lb 1.1 oz)  Min: 103.4 kg (228 lb 1.1 oz)  Max: 103.4 kg (228 lb 1.1 oz)  Body mass index is 32.72 kg/m².  No intake/output data recorded.    Physical Examination:  General: The patient is pleasant and cooperative and in acute distress. Wife is at bedside.  Head: Skull is normocephalic and atraumatic.  Eyes: Pupils are equal, round, and reactive to light. Extraocular movements are intact and equal. Anicteric sclera.  Mouth: Palate is intact. Mucous membranes are moist. Posterior pharynx is free of injection, exudate, or ulcers.   Chest: Respirations are non-labored. Clear to auscultation with bilateral breath sounds.   Cardiovascular: Regular rate and rhythm. +2 peripheral pulses.  Abdomen: Soft, non-distended, diffuse epigastric tenderness, with negative bowel sounds. No rebound/guarding.   Extremities: Moves all extremities equally and symmetrically.   Neurological: Alert and oriented. No focal neurologic deficits. Answered questions appropriately. No slurred speech.    Laboratory:  Most Recent Data:  CBC:   Lab Results   Component Value Date    WBC 12.3 (H) 2022    HGB 13.7 (L) 2022    HCT 41.9 (L) 2022     2022    MCV 89.9 2022    RDW 13.3 2022     WBC Differential:   Recent Labs   Lab 22  1215 22   WBC 5.0 12.3*   HGB 12.2* 13.7*   HCT 37.3* 41.9*    355   MCV 89.0 89.9     BMP:   Lab Results   Component Value Date     2022    K 4.6 2022    CO2 23 2022    BUN 29.8 (H) 2022    CREATININE 1.89 (H) 2022    CALCIUM 9.9 2022    MG 1.70 2022    PHOS 3.0 2022     LFTs:   Lab Results   Component Value Date    ALBUMIN 3.8 2022    BILITOT 0.9 2022    AST 16 2022    ALKPHOS 77 2022    ALT 11 2022     Coags:   Lab Results   Component Value Date    INR 1.16 2022    PROTIME 14.5 2022 "    PTT 30.7 07/02/2022     FLP:   Lab Results   Component Value Date    CHOL 138 01/11/2022    HDL 38 01/11/2022    TRIG 154 (H) 01/11/2022     DM:   Lab Results   Component Value Date    CREATININE 1.89 (H) 07/03/2022     Thyroid:   Lab Results   Component Value Date    TSH 1.849 07/07/2020      Anemia:   Lab Results   Component Value Date    IRON 52 (L) 01/06/2021    TIBC 277 01/06/2021    FERRITIN 32.71 01/06/2021       Lab Results   Component Value Date    TNKAMEVE46 185 (L) 10/22/2018     No results found for: FOLATE     Cardiac:   Lab Results   Component Value Date    TROPONINI 0.016 07/02/2022     Urinalysis:   Lab Results   Component Value Date    COLORU YELLOW 09/03/2021    PHUA 5.5 02/10/2018    NITRITE Negative 09/03/2021    KETONESU Negative 09/03/2021    UROBILINOGEN Normal 09/03/2021    WBCUA 6-10 (A) 09/03/2021       Trended Lab Data:  Recent Labs   Lab 07/01/22  1215 07/02/22 2030 07/03/22  0419   WBC 5.0 12.3*  --    HGB 12.2* 13.7*  --    HCT 37.3* 41.9*  --     355  --    MCV 89.0 89.9  --    RDW 13.1 13.3  --    * 141 141   K 4.4 4.4 4.6   CO2 25 26 23   BUN 24.0 29.6* 29.8*   CREATININE 1.95* 2.27* 1.89*   ALBUMIN 3.9 4.1 3.8   BILITOT 0.6 1.0 0.9   AST 16 16 16   ALKPHOS 79 82 77   ALT 14 16 11       Trended Cardiac Data:  Recent Labs   Lab 07/02/22  2250   TROPONINI 0.016       Microbiology Data:  Microbiology Results (last 7 days)     ** No results found for the last 168 hours. **             Radiology:  Imaging Results          CT Abdomen Pelvis  Without Contrast (Preliminary result)  Result time 07/03/22 03:26:57    Preliminary result by Paul Mir MD (07/03/22 03:26:57)                 Narrative:    START OF REPORT:  Technique: CT of the abdomen and pelvis was performed with axial images as well as sagittal and coronal reconstruction images without intravenous contrast or oral contrast.    Comparison: None available.    Clinical History: Pt presents to ed with reports of  weakness with n/v starting late last night. Pt reports abd pain and vomiting black liquid.    Dosage Information: Automated Exposure Control was utilized.    Findings:  Lines and Tubes: None.  Thorax: The right diaphragm is elevated.  Lungs: Streaky opacities are seen in the right middle lobe, lingula and bilateral lower lobes, which may reflect subsegmental atelectasis versus scarring.  Pleura: No effusions or thickening.  Heart: The heart size is within normal limits.  Abdomen:  Abdominal Wall: No abdominal wall pathology is seen.  Liver: The liver appears unremarkable.  Biliary System: No extrahepatic biliary duct dilatation is seen.  Gallbladder: Surgical clips are seen in the gallbladder fossa consistent with prior cholecystectomy.  Pancreas: The pancreas appears unremarkable.  Spleen: The spleen appears unremarkable.  Adrenals: The adrenal glands appear unremarkable.  Kidneys: The right kidney is small in size when compared to the left. There is a small left renal parapelvic cyst. There are 2-3 mm nonobstructing bilateral renal calculi. No ureteric calculus or ureteral obstruction. The kidneys otherwise appear unremarkable.  Aorta: There is mild calcification of the abdominal aorta and its branches.  IVC: Unremarkable.  Bowel:  Esophagus: The visualized esophagus appears unremarkable.  Stomach: The stomach appears unremarkable.  Duodenum: Unremarkable appearing duodenum.  Small Bowel: There is pronounced the location of nearly the entire small bowel with a relatively rapid transition in the distal/terminal ileum centered on images 57 - 64. Correlate with clinical and laboratory findings as regards additional evaluation and follow-up.  Colon: The colon is essentially completely decompressed. There are multiple colonic diverticula without evidence of diverticulitis.  Appendix: No appendix is identified.  Peritoneum: No intraperitoneal free air or ascites is seen.    Pelvis:  Bladder: The bladder appears  unremarkable.  Male:  Prostate gland: The prostate gland appears unremarkable.  Inguinal Findings:  Inguinal Hernia: Incidental note is made of small uncomplicated mesenteric fat containing bilateral inguinal hernias.    Bony structures:  Dorsal Spine: There is spondylosis of the visualized dorsal spine.      Impression:  1. There is pronounced the location of nearly the entire small bowel with a relatively rapid transition in the distal/terminal ileum centered on images 57 - 64. This is suggestive of distal mechanical small bowel obstruction. Correlate with clinical and laboratory findings as regards additional evaluation and follow-up.  2. Details and other findings as discussed above.                                 X-Ray Chest 1 View (Final result)  Result time 07/02/22 21:17:23    Final result by Marco A Stern MD (07/02/22 21:17:23)                 Impression:      NO ACUTE CARDIOPULMONARY PROCESS IDENTIFIED.      Electronically signed by: Marco A Stern  Date:    07/02/2022  Time:    21:17             Narrative:    EXAMINATION:  XR CHEST 1 VIEW    CLINICAL HISTORY:  abdominal pain;    TECHNIQUE:  One view    COMPARISON:  December 3, 2006.    FINDINGS:  Cardiopericardial silhouette appears mildly enlarged on this portable radiograph.  Lungs are of low volume without dense focal or segmental consolidation, congestive process, pleural effusions or pneumothorax.                               X-Ray Abdomen Flat And Erect (In process)                      Assessment & Plan:   Gastritis   SBO  Vomiting  -Admitted to Medicine  -Surgery consulted   -H/H 13.7/41.9; PT 14.5; aPTT 30.7; INR 1.16; Lactate 1.1  -IVF LR at 125 ml/hr   -PPI IV  -Started on Zosyn  -PRN Zofran IV  -NPO  -KUB pending   -CT abdomen and pelvis showed There is pronounced the location of nearly the entire small bowel with a relatively rapid transition in the distal/terminal ileum centered on images 57 - 64. This is suggestive of distal mechanical  small bowel obstruction. Correlate with clinical and laboratory findings as regards additional evaluation and follow-up.  -CXR revealed Cardiopericardial silhouette appears mildly enlarged on this portable radiograph.  Lungs are of low volume without dense focal or segmental consolidation, congestive process, pleural effusions or pneumothorax.    ERICK  -BUN/Cr 29.8/1.89  -Continue IVF    HTN  -PRN Hydralazine, holding PO medications  -Home medications include: metoprolol succinate 50 mg daily, spironolactone 12.5 mg daily, Norvasc 10 mg    A.fib  -Holding Eliquis and other anticoagulation at this time       CODE STATUS: FULL  Access: Peripheral IV  Antibiotics: Zosyn  Diet: NPO  DVT Prophylaxis: SCD's  GI Prophylaxis: PPI IV   Fluids: LR at 125 ml/hr      Disposition: 66 yr old m admitted to Medicine service secondary to gastritis and SBO. Surgery has been consulted. Continue with IVF. Started on Zosyn, PPI, Zofran (PRN). Currently NPO. Will closely monitor.       Sherry Irby MD  HO-3, FM

## 2022-07-04 LAB
ALBUMIN SERPL-MCNC: 3.4 GM/DL (ref 3.4–4.8)
ALBUMIN/GLOB SERPL: 1.1 RATIO (ref 1.1–2)
ALP SERPL-CCNC: 62 UNIT/L (ref 40–150)
ALT SERPL-CCNC: 11 UNIT/L (ref 0–55)
AST SERPL-CCNC: 16 UNIT/L (ref 5–34)
BASOPHILS # BLD AUTO: 0.01 X10(3)/MCL (ref 0–0.2)
BASOPHILS NFR BLD AUTO: 0.1 %
BILIRUBIN DIRECT+TOT PNL SERPL-MCNC: 1 MG/DL
BUN SERPL-MCNC: 26.5 MG/DL (ref 8.4–25.7)
CALCIUM SERPL-MCNC: 9.9 MG/DL (ref 8.8–10)
CHLORIDE SERPL-SCNC: 106 MMOL/L (ref 98–107)
CO2 SERPL-SCNC: 24 MMOL/L (ref 23–31)
CREAT SERPL-MCNC: 2.16 MG/DL (ref 0.73–1.18)
EOSINOPHIL # BLD AUTO: 0.06 X10(3)/MCL (ref 0–0.9)
EOSINOPHIL NFR BLD AUTO: 0.8 %
ERYTHROCYTE [DISTWIDTH] IN BLOOD BY AUTOMATED COUNT: 13.4 % (ref 11.5–17)
GLOBULIN SER-MCNC: 3.2 GM/DL (ref 2.4–3.5)
GLUCOSE SERPL-MCNC: 127 MG/DL (ref 82–115)
GLUCOSE SERPL-MCNC: 145 MG/DL (ref 70–110)
HCT VFR BLD AUTO: 33.4 % (ref 42–52)
HGB BLD-MCNC: 11.1 GM/DL (ref 14–18)
IMM GRANULOCYTES # BLD AUTO: 0.04 X10(3)/MCL (ref 0–0.04)
IMM GRANULOCYTES NFR BLD AUTO: 0.6 %
LYMPHOCYTES # BLD AUTO: 0.77 X10(3)/MCL (ref 0.6–4.6)
LYMPHOCYTES NFR BLD AUTO: 10.8 %
MAGNESIUM SERPL-MCNC: 1.8 MG/DL (ref 1.6–2.6)
MCH RBC QN AUTO: 29.7 PG (ref 27–31)
MCHC RBC AUTO-ENTMCNC: 33.2 MG/DL (ref 33–36)
MCV RBC AUTO: 89.3 FL (ref 80–94)
MONOCYTES # BLD AUTO: 0.61 X10(3)/MCL (ref 0.1–1.3)
MONOCYTES NFR BLD AUTO: 8.5 %
NEUTROPHILS # BLD AUTO: 5.7 X10(3)/MCL (ref 2.1–9.2)
NEUTROPHILS NFR BLD AUTO: 79.2 %
NRBC BLD AUTO-RTO: 0 %
PHOSPHATE SERPL-MCNC: 2.9 MG/DL (ref 2.3–4.7)
PLATELET # BLD AUTO: 220 X10(3)/MCL (ref 130–400)
PMV BLD AUTO: 9.3 FL (ref 7.4–10.4)
POCT GLUCOSE: 117 MG/DL (ref 70–110)
POCT GLUCOSE: 126 MG/DL (ref 70–110)
POCT GLUCOSE: 126 MG/DL (ref 70–110)
POTASSIUM SERPL-SCNC: 4.2 MMOL/L (ref 3.5–5.1)
PROT SERPL-MCNC: 6.6 GM/DL (ref 5.8–7.6)
RBC # BLD AUTO: 3.74 X10(6)/MCL (ref 4.7–6.1)
SODIUM SERPL-SCNC: 143 MMOL/L (ref 136–145)
WBC # SPEC AUTO: 7.2 X10(3)/MCL (ref 4.5–11.5)

## 2022-07-04 PROCEDURE — 25000003 PHARM REV CODE 250: Performed by: STUDENT IN AN ORGANIZED HEALTH CARE EDUCATION/TRAINING PROGRAM

## 2022-07-04 PROCEDURE — 83735 ASSAY OF MAGNESIUM: CPT | Performed by: STUDENT IN AN ORGANIZED HEALTH CARE EDUCATION/TRAINING PROGRAM

## 2022-07-04 PROCEDURE — C9113 INJ PANTOPRAZOLE SODIUM, VIA: HCPCS | Performed by: STUDENT IN AN ORGANIZED HEALTH CARE EDUCATION/TRAINING PROGRAM

## 2022-07-04 PROCEDURE — 11000001 HC ACUTE MED/SURG PRIVATE ROOM

## 2022-07-04 PROCEDURE — 97116 GAIT TRAINING THERAPY: CPT

## 2022-07-04 PROCEDURE — 36415 COLL VENOUS BLD VENIPUNCTURE: CPT | Performed by: STUDENT IN AN ORGANIZED HEALTH CARE EDUCATION/TRAINING PROGRAM

## 2022-07-04 PROCEDURE — 80053 COMPREHEN METABOLIC PANEL: CPT | Performed by: STUDENT IN AN ORGANIZED HEALTH CARE EDUCATION/TRAINING PROGRAM

## 2022-07-04 PROCEDURE — 85025 COMPLETE CBC W/AUTO DIFF WBC: CPT | Performed by: STUDENT IN AN ORGANIZED HEALTH CARE EDUCATION/TRAINING PROGRAM

## 2022-07-04 PROCEDURE — 21400001 HC TELEMETRY ROOM

## 2022-07-04 PROCEDURE — 84100 ASSAY OF PHOSPHORUS: CPT | Performed by: STUDENT IN AN ORGANIZED HEALTH CARE EDUCATION/TRAINING PROGRAM

## 2022-07-04 PROCEDURE — 63600175 PHARM REV CODE 636 W HCPCS: Performed by: STUDENT IN AN ORGANIZED HEALTH CARE EDUCATION/TRAINING PROGRAM

## 2022-07-04 RX ORDER — MAGNESIUM SULFATE HEPTAHYDRATE 40 MG/ML
2 INJECTION, SOLUTION INTRAVENOUS ONCE
Status: COMPLETED | OUTPATIENT
Start: 2022-07-04 | End: 2022-07-04

## 2022-07-04 RX ADMIN — PIPERACILLIN SODIUM AND TAZOBACTAM SODIUM 4.5 G: 4; .5 INJECTION, POWDER, LYOPHILIZED, FOR SOLUTION INTRAVENOUS at 05:07

## 2022-07-04 RX ADMIN — PIPERACILLIN SODIUM AND TAZOBACTAM SODIUM 4.5 G: 4; .5 INJECTION, POWDER, LYOPHILIZED, FOR SOLUTION INTRAVENOUS at 09:07

## 2022-07-04 RX ADMIN — SODIUM CHLORIDE, POTASSIUM CHLORIDE, SODIUM LACTATE AND CALCIUM CHLORIDE: 600; 310; 30; 20 INJECTION, SOLUTION INTRAVENOUS at 08:07

## 2022-07-04 RX ADMIN — SODIUM CHLORIDE, POTASSIUM CHLORIDE, SODIUM LACTATE AND CALCIUM CHLORIDE: 600; 310; 30; 20 INJECTION, SOLUTION INTRAVENOUS at 02:07

## 2022-07-04 RX ADMIN — LABETALOL HYDROCHLORIDE 20 MG: 5 INJECTION, SOLUTION INTRAVENOUS at 06:07

## 2022-07-04 RX ADMIN — MAGNESIUM SULFATE IN WATER 2 G: 40 INJECTION, SOLUTION INTRAVENOUS at 10:07

## 2022-07-04 RX ADMIN — SPIRONOLACTONE 12.5 MG: 25 TABLET, FILM COATED ORAL at 09:07

## 2022-07-04 RX ADMIN — PANTOPRAZOLE SODIUM 40 MG: 40 INJECTION, POWDER, FOR SOLUTION INTRAVENOUS at 10:07

## 2022-07-04 RX ADMIN — LABETALOL HYDROCHLORIDE 20 MG: 5 INJECTION, SOLUTION INTRAVENOUS at 12:07

## 2022-07-04 RX ADMIN — PIPERACILLIN SODIUM AND TAZOBACTAM SODIUM 4.5 G: 4; .5 INJECTION, POWDER, LYOPHILIZED, FOR SOLUTION INTRAVENOUS at 01:07

## 2022-07-04 NOTE — PT/OT/SLP PROGRESS
Physical Therapy Treatment    Patient Name:  Omer Booker   MRN:  [id number]    Recommendations:     Discharge Recommendations:  home with home health   Discharge Equipment Recommendations: walker, rolling, other (see comments) (pt needs a new RW; current RW > 6 years old)   Barriers to discharge: Level of Skilled Assistance Needed    Assessment:     Omer Booker is a 66 y.o. male admitted with a medical diagnosis of <principal problem not specified>.  He presents with the following impairments/functional limitations:    decreased ROM in LE's, muscle weakness, decreased endurance.    Rehab Prognosis: Good; patient would benefit from acute skilled PT services to address these deficits and reach maximum level of function.    Recent Surgery: * No surgery found *      Plan:     During this hospitalization, patient to be seen  (3-5xwk) to address the identified rehab impairments via   and progress toward the following goals:    · Plan of Care Expires:  08/03/22    Subjective     Chief Complaint: weakness in legs  Patient/Family Comments/goals: return home  Pain/Comfort:  · Pain Rating 1: 0/10  · Pain Rating Post-Intervention 1: 0/10      Objective:     Communicated with nurse Narvaez prior to session.  Patient found supine with   upon PT entry to room.     General Precautions: Standard, fall   Orthopedic Precautions:N/A   Braces: N/A  Respiratory Status: Room air     Functional Mobility:  · Bed Mobility:     · Supine to Sit: moderate assistance, uses bed to raise head part.   · Transfers:     · Sit to Stand:  stand by assistance with rolling walker from an elevated bed position.  · Gait: Pt. ambulated with RW, 2 person contact guardassist on each side and a CNA pushed wc behind pt. for safety. Pt. was able to ambulate a distance of 130 ft, pt. required cues to slow down. Pt.'s knees were extended and trunk leaning forward on to the RW. Slightly SOB noticed after pt. was finished.   · Balance: Fair     Patient left  supine with all lines intact, call button in reach and nurse notified..    GOALS:   Multidisciplinary Problems     Physical Therapy Goals        Problem: Physical Therapy    Goal Priority Disciplines Outcome Goal Variances Interventions   Physical Therapy Goal     PT, PT/OT Ongoing, Progressing     Description: Goals to be met by: Discharge     Patient will increase functional independence with mobility by performin. Supine to sit with Stand-by Assistance  2. Sit to supine with Stand-by Assistance  3. Sit to stand transfer with Stand-by Assistance  4. Gait  x 50 feet with Stand-by Assistance using Rolling Walker.                      Time Tracking:     PT Received On: 22  PT Start Time: 819     PT Stop Time: 840  PT Total Time (min): 21 min     Billable Minutes: Gait Training 21    Treatment Type: Treatment  PT/PTA: PT           2022

## 2022-07-04 NOTE — PLAN OF CARE
07/04/22 1000   Discharge Assessment   Assessment Type Discharge Planning Assessment   Confirmed/corrected address, phone number and insurance Yes   Confirmed Demographics Correct on Facesheet   Source of Information patient   When was your last doctors appointment?   (PCP: Dr. Luis Eduardo Pulliam)   Lives With spouse;child(solange), adult   Do you expect to return to your current living situation? Yes   Do you have help at home or someone to help you manage your care at home? Yes   Who are your caregiver(s) and their phone number(s)? Mikala Booker, wife, P: [phone]   Prior to hospitilization cognitive status: Alert/Oriented   Current cognitive status: Alert/Oriented   Dressing/Bathing Difficulty none   Home Accessibility wheelchair accessible   Equipment Currently Used at Home walker, rolling;bedside commode;cane, straight;glucometer  (BP monitor)   Do you currently have service(s) that help you manage your care at home? No   Do you take prescription medications? Yes  ([facility] in [place])   Do you have prescription coverage? Yes   Do you have any problems affording any of your prescribed medications? No   Is the patient taking medications as prescribed? yes   Who is going to help you get home at discharge? Mikala Booker, wife, P: [phone]   How do you get to doctors appointments? family or friend will provide   Are you on dialysis? No   Do you take coumadin? No   Discharge Plan A Home with family   DME Needed Upon Discharge  none   Discharge Plan discussed with: Patient   Discharge Barriers Identified None   Relationship/Environment   Name(s) of Who Lives With Patient Mikala Booker, wife, P: [phone]

## 2022-07-04 NOTE — PROGRESS NOTES
[facility] Medicine Wards History & Physical Note     Resident Team: [facility] Medicine List 1  Attending Physician: Dr. Fuentes  Resident: Dr. Irby    Date of Admit: 2022      History of Present Illness:  Omer Booker is a 66 y.o. male who with a history of CKD 3, heart failure EF 45%, paroxysmal A. Fib, HTN, DM who presented to the ED on 2022  with a primary complaint of Weakness and Vomiting (Pt presents to ed with reports of weakness with n/v starting late last night. Pt reports abd pain and vomiting black liquid. Pt currently hypotensive and escorted to room 3 at this time. )  Patient reports he started vomiting around 6:00 am yesterday morning. Reports five episodes of black emesis, nonbloody. He also reports diffuse abdominal pain without  any radiation. Patient did not take anything for the vomiting or abdominal pain. He had two BM and is also passing gas appropriately. He denies any prior similar episodes. He denies fever, chills, diaphoresis, headache, chest pain.     Interval hx 22: No acute events overnight. Tolerating NG tube appropriately, with 1800 cc output of greenish colored fluid. He reports his abdomen feels a lot better. He reports he has not had a BM since hospital admission. Denies any emesis episodes since hospital admission. He denies fever, chills, chest pain, abdominal pain, and N/V.       Review of Systems:  See above         Objective:   Last 24 Hour Vital Signs:  BP  Min: 123/71  Max: 193/82  Temp  Av.2 °F (36.8 °C)  Min: 97.8 °F (36.6 °C)  Max: 99 °F (37.2 °C)  Pulse  Av.5  Min: 77  Max: 106  Resp  Av  Min: 16  Max: 22  SpO2  Av.4 %  Min: 95 %  Max: 100 %  Body mass index is 33.21 kg/m².  I/O last 3 completed shifts:  In: 900 [I.V.:900]  Out: 940 [Urine:340; Emesis/NG output:100; Other:500]    Physical Examination:  General: The patient is pleasant and cooperative and in acute distress. NG tube in place.  Head: Skull is normocephalic and atraumatic.  Eyes: Pupils  are equal, round, and reactive to light. Extraocular movements are intact and equal. Anicteric sclera.  Chest: Respirations are non-labored. Clear to auscultation with bilateral breath sounds.   Cardiovascular: Regular rate and rhythm. +2 peripheral pulses.  Abdomen: Soft, non-distended, non jose rafael. No rebound/guarding.   Extremities: Moves all extremities equally and symmetrically.   Neurological: Alert and oriented. No focal neurologic deficits. Answered questions appropriately. No slurred speech.    Laboratory:  Most Recent Data:  CBC:   Lab Results   Component Value Date    WBC 7.2 07/04/2022    HGB 11.1 (L) 07/04/2022    HCT 33.4 (L) 07/04/2022     07/04/2022    MCV 89.3 07/04/2022    RDW 13.4 07/04/2022     WBC Differential:   Recent Labs   Lab 07/01/22  1215 07/02/22  2030 07/04/22  0438   WBC 5.0 12.3* 7.2   HGB 12.2* 13.7* 11.1*   HCT 37.3* 41.9* 33.4*    355 220   MCV 89.0 89.9 89.3     BMP:   Lab Results   Component Value Date     07/04/2022    K 4.2 07/04/2022    CO2 24 07/04/2022    BUN 26.5 (H) 07/04/2022    CREATININE 2.16 (H) 07/04/2022    CALCIUM 9.9 07/04/2022    MG 1.80 07/04/2022    PHOS 2.9 07/04/2022     LFTs:   Lab Results   Component Value Date    ALBUMIN 3.4 07/04/2022    BILITOT 1.0 07/04/2022    AST 16 07/04/2022    ALKPHOS 62 07/04/2022    ALT 11 07/04/2022     Coags:   Lab Results   Component Value Date    INR 1.16 07/02/2022    PROTIME 14.5 07/02/2022    PTT 30.7 07/02/2022     FLP:   Lab Results   Component Value Date    CHOL 138 01/11/2022    HDL 38 01/11/2022    TRIG 154 (H) 01/11/2022     DM:   Lab Results   Component Value Date    CREATININE 2.16 (H) 07/04/2022     Thyroid:   Lab Results   Component Value Date    TSH 1.849 07/07/2020      Anemia:   Lab Results   Component Value Date    IRON 52 (L) 01/06/2021    TIBC 277 01/06/2021    FERRITIN 32.71 01/06/2021       Lab Results   Component Value Date    OQARKECS90 185 (L) 10/22/2018     No results found for:  FOLATE     Cardiac:   Lab Results   Component Value Date    TROPONINI 0.016 07/02/2022     Urinalysis:   Lab Results   Component Value Date    COLORU YELLOW 09/03/2021    PHUA 5.5 02/10/2018    NITRITE Negative 09/03/2021    KETONESU Negative 09/03/2021    UROBILINOGEN Normal 09/03/2021    WBCUA 6-10 (A) 09/03/2021       Trended Lab Data:  Recent Labs   Lab 07/01/22  1215 07/02/22 2030 07/03/22  0419 07/04/22  0438   WBC 5.0 12.3*  --  7.2   HGB 12.2* 13.7*  --  11.1*   HCT 37.3* 41.9*  --  33.4*    355  --  220   MCV 89.0 89.9  --  89.3   RDW 13.1 13.3  --  13.4   * 141 141 143   K 4.4 4.4 4.6 4.2   CO2 25 26 23 24   BUN 24.0 29.6* 29.8* 26.5*   CREATININE 1.95* 2.27* 1.89* 2.16*   ALBUMIN 3.9 4.1 3.8 3.4   BILITOT 0.6 1.0 0.9 1.0   AST 16 16 16 16   ALKPHOS 79 82 77 62   ALT 14 16 11 11       Trended Cardiac Data:  Recent Labs   Lab 07/02/22  2250   TROPONINI 0.016       Microbiology Data:  Microbiology Results (last 7 days)     Procedure Component Value Units Date/Time    Blood Culture [[id number]]     Order Status: Canceled Specimen: Venous Blood Line              Radiology:  Imaging Results          CT Abdomen Pelvis  Without Contrast (Final result)  Result time 07/03/22 11:05:37    Final result by Fátima García MD (07/03/22 11:05:37)                 Impression:      1. Dilated small bowel loops with air-fluid levels and gradual/nonfocal transition to decompressed terminal ileum.  Correlate for ileus or partial obstruction.  2. Nonobstructing left ureteral stone.  3. Nonobstructing nephrolithiasis  4. Colonic diverticulosis  Minor discrepancy from preliminary report re: Distal left ureteral stone without hydronephrosis.      Electronically signed by: Fátima García  Date:    07/03/2022  Time:    11:05             Narrative:    EXAMINATION:  CT ABDOMEN PELVIS WITHOUT CONTRAST    CLINICAL HISTORY:  Abdominal pain, acute, nonlocalized;    TECHNIQUE:  Helically acquired axial images, sagittal  and coronal reformations were obtained from the lung bases to the pubic symphysis without the IV administration of contrast.    Automated tube current modulation, weight-based exposure dosing, and/or iterative reconstruction technique utilized to reach lowest reasonably achievable exposure rate.    DLP: 639 mGy*cm    COMPARISON:  Plain radiograph 07/02/2022, CT abdomen pelvis 02/11/2018    FINDINGS:  HEART: There are coronary artery calcifications.    LUNG BASES: Basilar atelectasis.    LIVER: Normal attenuation. No appreciable focal hepatic lesion.    BILIARY: Gallbladder is surgically absent.    PANCREAS: No inflammatory change.    SPLEEN: Normal in size    ADRENALS: No mass.    KIDNEYS/URETERS: There is a 4 mm calculus at the distal left ureter without hydronephrosis.  There are punctate 2-3 mm bilateral renal caliceal calculi.  No hydronephrosis.  No perinephric stranding.  No hydroureter.  Left peripelvic renal cyst, incidental requiring no imaging follow-up.    GI TRACT/MESENTERY: Positive enteric contrast was administered.  Contrast is seen within the stomach.  No contrast within the small bowel.  Small bowel loops are dilated measuring up to 4 cm in diameter with air-fluid levels.  There is a transition to the decompressed terminal ileum with gradual tapering in the right lower quadrant.  The colon is decompressed.  Colonic diverticulosis without acute inflammatory change.    PERITONEUM: No free fluid.No free air.    LYMPH NODES: No enlarged lymph nodes by size criteria.    VASCULATURE: Aortoiliac atherosclerosis.    BLADDER: Normal appearance given degree of distention.    REPRODUCTIVE ORGANS: Normal as visualized.    ABDOMINAL WALL: Unremarkable.    BONES: Lumbar spondylosis most pronounced at L4-L5.    OTHER: Mild gynecomastia                    Preliminary result by Fátima García MD (07/03/22 03:26:57)                 Narrative:    START OF REPORT:  Technique: CT of the abdomen and pelvis was  performed with axial images as well as sagittal and coronal reconstruction images without intravenous contrast or oral contrast.    Comparison: None available.    Clinical History: Pt presents to ed with reports of weakness with n/v starting late last night. Pt reports abd pain and vomiting black liquid.    Dosage Information: Automated Exposure Control was utilized.    Findings:  Lines and Tubes: None.  Thorax: The right diaphragm is elevated.  Lungs: Streaky opacities are seen in the right middle lobe, lingula and bilateral lower lobes, which may reflect subsegmental atelectasis versus scarring.  Pleura: No effusions or thickening.  Heart: The heart size is within normal limits.  Abdomen:  Abdominal Wall: No abdominal wall pathology is seen.  Liver: The liver appears unremarkable.  Biliary System: No extrahepatic biliary duct dilatation is seen.  Gallbladder: Surgical clips are seen in the gallbladder fossa consistent with prior cholecystectomy.  Pancreas: The pancreas appears unremarkable.  Spleen: The spleen appears unremarkable.  Adrenals: The adrenal glands appear unremarkable.  Kidneys: The right kidney is small in size when compared to the left. There is a small left renal parapelvic cyst. There are 2-3 mm nonobstructing bilateral renal calculi. No ureteric calculus or ureteral obstruction. The kidneys otherwise appear unremarkable.  Aorta: There is mild calcification of the abdominal aorta and its branches.  IVC: Unremarkable.  Bowel:  Esophagus: The visualized esophagus appears unremarkable.  Stomach: The stomach appears unremarkable.  Duodenum: Unremarkable appearing duodenum.  Small Bowel: There is pronounced the location of nearly the entire small bowel with a relatively rapid transition in the distal/terminal ileum centered on images 57 - 64. Correlate with clinical and laboratory findings as regards additional evaluation and follow-up.  Colon: The colon is essentially completely decompressed. There  are multiple colonic diverticula without evidence of diverticulitis.  Appendix: No appendix is identified.  Peritoneum: No intraperitoneal free air or ascites is seen.    Pelvis:  Bladder: The bladder appears unremarkable.  Male:  Prostate gland: The prostate gland appears unremarkable.  Inguinal Findings:  Inguinal Hernia: Incidental note is made of small uncomplicated mesenteric fat containing bilateral inguinal hernias.    Bony structures:  Dorsal Spine: There is spondylosis of the visualized dorsal spine.      Impression:  1. There is pronounced the location of nearly the entire small bowel with a relatively rapid transition in the distal/terminal ileum centered on images 57 - 64. This is suggestive of distal mechanical small bowel obstruction. Correlate with clinical and laboratory findings as regards additional evaluation and follow-up.  2. Details and other findings as discussed above.                                 X-Ray Chest 1 View (Final result)  Result time 07/02/22 21:17:23    Final result by [place] A Stern MD (07/02/22 21:17:23)                 Impression:      NO ACUTE CARDIOPULMONARY PROCESS IDENTIFIED.      Electronically signed by: [place] A Stern  Date:    07/02/2022  Time:    21:17             Narrative:    EXAMINATION:  XR CHEST 1 VIEW    CLINICAL HISTORY:  abdominal pain;    TECHNIQUE:  One view    COMPARISON:  December 3, 2006.    FINDINGS:  Cardiopericardial silhouette appears mildly enlarged on this portable radiograph.  Lungs are of low volume without dense focal or segmental consolidation, congestive process, pleural effusions or pneumothorax.                               X-Ray Abdomen Flat And Erect (Final result)  Result time 07/03/22 08:37:07    Final result by Fátima García MD (07/03/22 08:37:07)                 Impression:      Dilated small bowel loops with scattered air-fluid levels suggesting obstruction versus ileus.      Electronically signed by: Fátima  Ricky  Date:    07/03/2022  Time:    08:37             Narrative:    EXAMINATION:  XR ABDOMEN FLAT AND ERECT    CLINICAL HISTORY:  Unspecified abdominal pain    TECHNIQUE:  Supine and upright views of the abdomen performed.    COMPARISON:  05/30/2016    FINDINGS:  Gas distended small bowel loops measure up to 5.3 cm in diameter.  There are fluid levels.    No evidence of free intraperitoneal air.    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                                     Assessment & Plan:   Gastritis   SBO  Vomiting, resolved  -Admitted to Medicine  -Surgery consulted, appreciate them follwing  -NG tube in place, collected 1800 cc output   -Continue IVF LR at 125 ml/hr   -PPI IV  -Started on Zosyn  -PRN Zofran IV  -NPO  -Bedside commode ordered   -This AM KUB pending   -KUB 7/3 showed dilated small bowel loops with contrast identified changes are suggestive of a small bowel obstruction no definite contrast identified in the colon. No other significant changes compared with the previous exam  -CT abdomen and pelvis showed There is pronounced the location of nearly the entire small bowel with a relatively rapid transition in the distal/terminal ileum centered on images 57 - 64. This is suggestive of distal mechanical small bowel obstruction. Correlate with clinical and laboratory findings as regards additional evaluation and follow-up.  -CXR revealed Cardiopericardial silhouette appears mildly enlarged on this portable radiograph.  Lungs are of low volume without dense focal or segmental consolidation, congestive process, pleural effusions or pneumothorax.    ERICK  -BUN/Cr 26.5/2.16  -Continue IVF    HTN  -Continue Labetalol 20mg IV q 6hrs   -PRN Hydralazine, holding PO medications    A.fib  -Holding Eliquis and other anticoagulation at this time   -Continue BB at this time       CODE STATUS: FULL  Access: Peripheral IV  Antibiotics: Zosyn  Diet: NPO  DVT Prophylaxis: SCD's  GI Prophylaxis: PPI IV   Fluids: LR at 125  ml/hr      Disposition: Admitted to Medicine service secondary to gastritis and SBO. Surgery placed NG tube, output of 1800 cc. Continue with IVF. Started on Labetalol, Zosyn, PPI, Zofran (PRN). NPO. Adjustable bedside commode ordered. Will closely monitor.       Sherry Irby MD  HO-3, FM

## 2022-07-04 NOTE — PROGRESS NOTES
[facility] Progress Note    Subjective: NAEON, passing gas, NGT output 1800 overnight, reports feeling better.     Objective  Temp:  [97.8 °F (36.6 °C)-99 °F (37.2 °C)] 98.5 °F (36.9 °C)  Pulse:  [] 81  Resp:  [16-22] 18  SpO2:  [95 %-100 %] 95 %  BP: (123-193)/(71-98) 143/78    No intake/output data recorded.  I/O last 3 completed shifts:  In: 900 [I.V.:900]  Out: 940 [Urine:340; Emesis/NG output:100; Other:500]    Gen: NAD, AAOx3  CV: regular rate, palpable radials  Pulm: nonlabored, no increased wob  Abd: s/nt/nd,  NGT in place and on suction      Labs:      Recent Labs   Lab 07/02/22 2030 07/02/22  2031 07/03/22  0419 07/04/22  0438   WBC 12.3*  --   --  7.2   HGB 13.7*  --   --  11.1*   HCT 41.9*  --   --  33.4*     --   --  220   INR  --  1.16  --   --      --  141 143   CHLORIDE 100  --  104 106   CO2 26  --  23 24   BUN 29.6*  --  29.8* 26.5*   CREATININE 2.27*  --  1.89* 2.16*   GLUCOSE 217*  --  163* 127*   CALCIUM 10.6*  --  9.9 9.9   MG  --   --  1.70 1.80   PHOS  --   --  3.0 2.9   ALKPHOS 82  --  77 62       Imaging:   SBFP reviewed, awaiting read      A/P:    66 y.o. male PMH of A-fib on Eliquis, HTN, DM, CKD stage 3, and a lap isai in 2016 who presents with n/v and an SBO with a transition point in the distal/terminal ileum on CT. SBFT showed the contrast not leaving the stomach.     NPO  Continue fluid resuscitation  Repeat KUB this morning   Suspicion for gastroparesis  Recommend GI consult   Recommend pro-motility agent pending GI input  Keep NGT in place on suction   No surgical intervention indicated at this time  Rest of care per primary team       Maria Maradiaga MD   [facility] PGY1          Agree w above  Feels better since NG placement  Passing flatus  Some component of gastric emptying delay on SBFT images  Continue NGT and check abdominal Xray this afternoon    Mike Stephens MD

## 2022-07-05 LAB
ALBUMIN SERPL-MCNC: 3 GM/DL (ref 3.4–4.8)
ALBUMIN/GLOB SERPL: 1 RATIO (ref 1.1–2)
ALP SERPL-CCNC: 58 UNIT/L (ref 40–150)
ALT SERPL-CCNC: 10 UNIT/L (ref 0–55)
AST SERPL-CCNC: 15 UNIT/L (ref 5–34)
BASOPHILS # BLD AUTO: 0.01 X10(3)/MCL (ref 0–0.2)
BASOPHILS NFR BLD AUTO: 0.2 %
BILIRUBIN DIRECT+TOT PNL SERPL-MCNC: 1 MG/DL
BUN SERPL-MCNC: 23.2 MG/DL (ref 8.4–25.7)
CALCIUM SERPL-MCNC: 9 MG/DL (ref 8.8–10)
CHLORIDE SERPL-SCNC: 106 MMOL/L (ref 98–107)
CO2 SERPL-SCNC: 28 MMOL/L (ref 23–31)
CREAT SERPL-MCNC: 2.24 MG/DL (ref 0.73–1.18)
EOSINOPHIL # BLD AUTO: 0.14 X10(3)/MCL (ref 0–0.9)
EOSINOPHIL NFR BLD AUTO: 3.4 %
ERYTHROCYTE [DISTWIDTH] IN BLOOD BY AUTOMATED COUNT: 13.2 % (ref 11.5–17)
GLOBULIN SER-MCNC: 3.1 GM/DL (ref 2.4–3.5)
GLUCOSE SERPL-MCNC: 98 MG/DL (ref 82–115)
HCT VFR BLD AUTO: 31.2 % (ref 42–52)
HGB BLD-MCNC: 9.9 GM/DL (ref 14–18)
IMM GRANULOCYTES # BLD AUTO: 0.03 X10(3)/MCL (ref 0–0.04)
IMM GRANULOCYTES NFR BLD AUTO: 0.7 %
LYMPHOCYTES # BLD AUTO: 0.66 X10(3)/MCL (ref 0.6–4.6)
LYMPHOCYTES NFR BLD AUTO: 16.1 %
MAGNESIUM SERPL-MCNC: 2.2 MG/DL (ref 1.6–2.6)
MCH RBC QN AUTO: 29.2 PG (ref 27–31)
MCHC RBC AUTO-ENTMCNC: 31.7 MG/DL (ref 33–36)
MCV RBC AUTO: 92 FL (ref 80–94)
MONOCYTES # BLD AUTO: 0.34 X10(3)/MCL (ref 0.1–1.3)
MONOCYTES NFR BLD AUTO: 8.3 %
NEUTROPHILS # BLD AUTO: 2.9 X10(3)/MCL (ref 2.1–9.2)
NEUTROPHILS NFR BLD AUTO: 71.3 %
NRBC BLD AUTO-RTO: 0 %
PHOSPHATE SERPL-MCNC: 3 MG/DL (ref 2.3–4.7)
PLATELET # BLD AUTO: 153 X10(3)/MCL (ref 130–400)
PMV BLD AUTO: 9 FL (ref 7.4–10.4)
POCT GLUCOSE: 104 MG/DL (ref 70–110)
POCT GLUCOSE: 82 MG/DL (ref 70–110)
POCT GLUCOSE: 86 MG/DL (ref 70–110)
POTASSIUM SERPL-SCNC: 4 MMOL/L (ref 3.5–5.1)
PROT SERPL-MCNC: 6.1 GM/DL (ref 5.8–7.6)
RBC # BLD AUTO: 3.39 X10(6)/MCL (ref 4.7–6.1)
SODIUM SERPL-SCNC: 144 MMOL/L (ref 136–145)
WBC # SPEC AUTO: 4.1 X10(3)/MCL (ref 4.5–11.5)

## 2022-07-05 PROCEDURE — 36415 COLL VENOUS BLD VENIPUNCTURE: CPT | Performed by: STUDENT IN AN ORGANIZED HEALTH CARE EDUCATION/TRAINING PROGRAM

## 2022-07-05 PROCEDURE — 25000003 PHARM REV CODE 250: Performed by: STUDENT IN AN ORGANIZED HEALTH CARE EDUCATION/TRAINING PROGRAM

## 2022-07-05 PROCEDURE — C9113 INJ PANTOPRAZOLE SODIUM, VIA: HCPCS | Performed by: STUDENT IN AN ORGANIZED HEALTH CARE EDUCATION/TRAINING PROGRAM

## 2022-07-05 PROCEDURE — 97166 OT EVAL MOD COMPLEX 45 MIN: CPT

## 2022-07-05 PROCEDURE — 84100 ASSAY OF PHOSPHORUS: CPT | Performed by: STUDENT IN AN ORGANIZED HEALTH CARE EDUCATION/TRAINING PROGRAM

## 2022-07-05 PROCEDURE — 83735 ASSAY OF MAGNESIUM: CPT | Performed by: STUDENT IN AN ORGANIZED HEALTH CARE EDUCATION/TRAINING PROGRAM

## 2022-07-05 PROCEDURE — 80053 COMPREHEN METABOLIC PANEL: CPT | Performed by: STUDENT IN AN ORGANIZED HEALTH CARE EDUCATION/TRAINING PROGRAM

## 2022-07-05 PROCEDURE — 63600175 PHARM REV CODE 636 W HCPCS: Performed by: STUDENT IN AN ORGANIZED HEALTH CARE EDUCATION/TRAINING PROGRAM

## 2022-07-05 PROCEDURE — 85025 COMPLETE CBC W/AUTO DIFF WBC: CPT | Performed by: STUDENT IN AN ORGANIZED HEALTH CARE EDUCATION/TRAINING PROGRAM

## 2022-07-05 PROCEDURE — 97116 GAIT TRAINING THERAPY: CPT

## 2022-07-05 PROCEDURE — 21400001 HC TELEMETRY ROOM

## 2022-07-05 RX ADMIN — SODIUM CHLORIDE, POTASSIUM CHLORIDE, SODIUM LACTATE AND CALCIUM CHLORIDE: 600; 310; 30; 20 INJECTION, SOLUTION INTRAVENOUS at 06:07

## 2022-07-05 RX ADMIN — PIPERACILLIN SODIUM AND TAZOBACTAM SODIUM 4.5 G: 4; .5 INJECTION, POWDER, LYOPHILIZED, FOR SOLUTION INTRAVENOUS at 05:07

## 2022-07-05 RX ADMIN — PIPERACILLIN SODIUM AND TAZOBACTAM SODIUM 4.5 G: 4; .5 INJECTION, POWDER, LYOPHILIZED, FOR SOLUTION INTRAVENOUS at 02:07

## 2022-07-05 RX ADMIN — LABETALOL HYDROCHLORIDE 20 MG: 5 INJECTION, SOLUTION INTRAVENOUS at 12:07

## 2022-07-05 RX ADMIN — PANTOPRAZOLE SODIUM 40 MG: 40 INJECTION, POWDER, FOR SOLUTION INTRAVENOUS at 11:07

## 2022-07-05 RX ADMIN — LABETALOL HYDROCHLORIDE 20 MG: 5 INJECTION, SOLUTION INTRAVENOUS at 06:07

## 2022-07-05 RX ADMIN — SPIRONOLACTONE 12.5 MG: 25 TABLET, FILM COATED ORAL at 09:07

## 2022-07-05 NOTE — PROGRESS NOTES
[facility] Progress Note    Subjective: MADELEINE, continues to pass gas but has not had a BM yet. NGT output 1350cc in past 24 hours.     Objective  Temp:  [97.7 °F (36.5 °C)-99 °F (37.2 °C)] 99 °F (37.2 °C)  Pulse:  [64-82] 64  Resp:  [18] 18  SpO2:  [91 %-97 %] 95 %  BP: (130-168)/(67-83) 130/67    No intake/output data recorded.  I/O last 3 completed shifts:  In: 1500 [I.V.:1500]  Out: 4150 [Urine:1000; Drains:3150]    Gen: NAD, AAOx3  CV: extremities wwp, regular rate, palpable radials  Pulm: nonlabored, no increased wob, equal chest rise b/l   Abd: s/nt/nd. NGT in place on wall suction.       Labs:      Recent Labs   Lab 07/02/22 2031 07/03/22  0419 07/04/22  0438 07/05/22  0502   WBC  --   --  7.2 4.1*   HGB  --   --  11.1* 9.9*   HCT  --   --  33.4* 31.2*   PLT  --   --  220 153   INR 1.16  --   --   --    NA  --    < > 143 144   CHLORIDE  --    < > 106 106   CO2  --    < > 24 28   BUN  --    < > 26.5* 23.2   CREATININE  --    < > 2.16* 2.24*   GLUCOSE  --    < > 127* 98   CALCIUM  --    < > 9.9 9.0   MG  --    < > 1.80 2.20   PHOS  --    < > 2.9 3.0   ALKPHOS  --    < > 62 58    < > = values in this interval not displayed.         Imaging:      EXAMINATION:  XR ABDOMEN FLAT AND ERECT    Date:                                            07/04/2022  Time:                                           10:13    Impression:     Dilated small bowel loops with contrast identified changes are suggestive of a small bowel obstruction no definite contrast identified in the colon.     No other significant changes compared with the previous exam      EXAMINATION:  XR ABDOMEN FLAT AND ERECT    Date:                                            07/04/2022  Time:                                           17:23    Read still pending    A/P:    66 y.o. male PMH of A-fib on Eliquis, HTN, DM, CKD stage 3, and a lap isai in 2016 who presents with n/v and an SBO vs ileus vs gastroparesis.     NPO  Continue fluid resuscitation    Recommend GI consult   Recommend pro-kinetic agent  Keep NGT in place on suction   No surgical intervention indicated at this time   Rest of care per primary team      Maria Maradiaga MD   [facility] PGY1      PGY-3 Addendum  Patient with SBO vs gastropariesis, unlikely due to obstructive process. He has been passing flatus but has not had a bowel movement. NG-tube is in place with 1350 mL bilious output over 24 hours. Abdomen remains soft, non-tender, and non-distended. Remain NPO with NG-tube to suction. Recommend reglan and GI consult for further workup.     Awilda Rojas MD  [facility], PGY-3

## 2022-07-05 NOTE — PT/OT/SLP PROGRESS
Physical Therapy Treatment    Patient Name:  Omer Booker   MRN:  [id number]    Recommendations:     Discharge Recommendations:  home with home health   Discharge Equipment Recommendations: none   Barriers to discharge: Level of Skilled Assistance Needed    Assessment:     Omer Booker is a 66 y.o. male admitted with a medical diagnosis of <principal problem not specified>.  He presents with the following impairments/functional limitations:  weakness, impaired self care skills, impaired functional mobilty, impaired balance, gait instability, decreased lower extremity function .    Rehab Prognosis: Good; patient would benefit from acute skilled PT services to address these deficits and reach maximum level of function.    Recent Surgery: * No surgery found *      Plan:     During this hospitalization, patient to be seen  (3-5xwk) to address the identified rehab impairments via gait training, therapeutic activities, therapeutic exercises and progress toward the following goals:    · Plan of Care Expires:  08/03/22    Subjective     Chief Complaint: Pt. Felt a little weaker today.  Patient/Family Comments/goals: return home  Pain/Comfort:  · Pain Rating 1: 0/10      Objective:     Communicated with nurse Narvaez prior to session.  Patient found supine with peripheral IV, telemetry, NG tube upon PT entry to room.     General Precautions: Standard, fall   Orthopedic Precautions:N/A   Braces: N/A  Respiratory Status: Room air     Functional Mobility:  · Bed Mobility:     · Supine to Sit: stand by assistance  · Transfers:     · Sit to Stand:  stand by assistance with rolling walker  · Gait: Pt. ambulated 130 ft with 2 person assist on each side and wc to follow by PT tech. Pt. had no buckling of knees or loss of balance today.   · Balance: Fair    Patient left supine with all lines intact, call button in reach and nurse notified..    GOALS:   Multidisciplinary Problems     Physical Therapy Goals        Problem: Physical  Therapy    Goal Priority Disciplines Outcome Goal Variances Interventions   Physical Therapy Goal     PT, PT/OT Ongoing, Progressing     Description: Goals to be met by: Discharge     Patient will increase functional independence with mobility by performin. Supine to sit with Stand-by Assistance  2. Sit to supine with Stand-by Assistance  3. Sit to stand transfer with Stand-by Assistance  4. Gait  x 50 feet with Stand-by Assistance using Rolling Walker.                      Time Tracking:     PT Received On: 22  PT Start Time: 925     PT Stop Time: 943  PT Total Time (min): 18 min     Billable Minutes: Gait Training 18    Treatment Type: Treatment  PT/PTA: PT           2022

## 2022-07-05 NOTE — PROGRESS NOTES
"[facility] Surg Telemetry  Adult Nutrition  Progress Note    SUMMARY       Recommendations  1. Advance diet once medically appropriate per MD; consider starting Clinimix 4.25/5 @ 85mL/hr, 20% lipids daily; to provide 1194 kcals and 87g pro until 60% po intake of meals.           2. If unable to advance diet in the next 1-2 days, consider placement for TPN; Clinimix 5/20 @ 70mL/hr, 20% lipids daily; to provide 1978 kcals (100% est needs), 84g pro (100% est needs), GIR: 2.2         3. Will continue to monitor days NPO/diet adv vs need for PN, wt, labs    Goals: Meet greater than 75% of nutritional needs by f/u  Nutrition Goal Status: new  Communication of RD Recs: reviewed with RN      Reason for Assessment  Reason For Assessment: Dx: SBO    Diagnosis: Gastritis     SBO    Vomiting, resolved    ERICK    Relevant Medical History: A-fib on Eliquis, HTN, DM, CKD stage 3, heart failure EF 45%, and a lap isai in 2016      Assessment and Plan  7/5/22: Pt currently NPO with NGT on LIWS;  NGT output 1350cc in past 24 hours per MD note. Pt reports last meal on Friday 7/1. Denies any N/V; reports last BM 7/2, none since admit. Pt reports no recent wt loss. Spoke with nsg; unsure when diet to be advanced, aware of PPN/TPN recs in chart.        Malnutrition Assessment  Malnutrition Type: acute illness or injury (Does not meet criteria)  Weight Loss (Malnutrition):  (Does not meet criteria)  Energy Intake (Malnutrition):  (Does not meet criteria)  Subcutaneous Fat (Malnutrition):  (Does not meet criteria)  Muscle Mass (Malnutrition):  (Does not meet criteria)  Fluid Accumulation (Malnutrition):  (Does not meet criteria)       Nutrition Risk Screen  Nutrition Risk Screen: no indicators present    Nutrition/Diet History  Food Allergies: NKFA  Factors Affecting Nutritional Intake: NPO, altered gastrointestinal function      Anthropometrics  Temp: 98.7 °F (37.1 °C)  Height Method: Estimated  Height: 5' 10" (177.8 " cm)  Height (inches): 70 in  Weight Method: Estimated  Weight: 105 kg (231 lb 7.7 oz)  Weight (lb): 231.49 lb  Ideal Body Weight (IBW), Male: 166 lb  % Ideal Body Weight, Male (lb): 139.45 %  BMI (Calculated): 33.2  BMI Grade: 30 - 34.9- obesity - grade I  Usual Body Weight (UBW), k kg  % Usual Body Weight: 100.21  % Weight Change From Usual Weight: 0 %       Wt Readings from Last 3 Encounters:   22 1315 105 kg (231 lb 7.7 oz)   22 0700 105 kg (231 lb 7.7 oz)   22 1959 103.4 kg (228 lb 1.1 oz)   21 0854 113.9 kg (251 lb 1.7 oz)       Lab/Procedures/Meds  Pertinent Labs Comments: -Creat 2.24, Alb 3.0, GFR 31    Pertinent Medications Comments: protonix, spironolactone, LR 125ml/hr      Estimated/Assessed Needs  Weight Used For Calorie Calculations: 105 kg (231 lb 7.7 oz) (Adj. BW)  Energy Calorie Requirements (kcal): 8963-6032 kcals (18-20 kcal/kg)  Energy Need Method: Kcal/kg  Protein Requirements: 83-91 g (1.0-1.1 g/kg)  Weight Used For Protein Calculations: 82.7 kg (182 lb 5.1 oz) (Adj BW)  Estimated Fluid Requirement Method: RDA Method  RDA Method (mL): 1890         Nutrition Prescription Ordered  Current Diet Order: NPO  Energy Calories Required: not meeting needs  Protein Required: not meeting needs  Fluid Required: not meeting needs      Evaluation of Received Nutrient/Fluid Intake  % Intake of Estimated Energy Needs: 0 - 25 %  % Meal Intake: NPO      Nutrition Problem  Altered GI Function    Related to (etiology):   SBO    Signs and Symptoms (as evidenced by):   NPO    Interventions(treatment strategy):  General / Healthful Diet, Modified rate of parenteral nutrition, Modified composition of parenteral nutrition and Collaboration with other providers    Nutrition Diagnosis Status:   New       Nutrition Risk  Level of Risk/Frequency of Follow-up: moderate     Monitor and Evaluation  Food and Nutrient Intake: energy intake  Food and Nutrient Adminstration: diet order, enteral and  parenteral nutrition administration  Anthropometric Measurements: weight change  Biochemical Data, Medical Tests and Procedures: electrolyte and renal panel, gastrointestinal profile, glucose/endocrine profile     Nutrition Follow-Up  RD Follow-up?: Yes      Amy Coats, GLORY  07/05/2022

## 2022-07-05 NOTE — PLAN OF CARE
Problem: Adult Inpatient Plan of Care  Goal: Plan of Care Review  Outcome: Ongoing, Progressing  Flowsheets (Taken 7/5/2022 0431)  Plan of Care Reviewed With: patient  Goal: Patient-Specific Goal (Individualized)  Outcome: Ongoing, Progressing  Goal: Absence of Hospital-Acquired Illness or Injury  Outcome: Ongoing, Progressing  Goal: Optimal Comfort and Wellbeing  Outcome: Ongoing, Progressing  Intervention: Monitor Pain and Promote Comfort  Flowsheets (Taken 7/5/2022 0431)  Pain Management Interventions:   position adjusted   pillow support provided  Goal: Readiness for Transition of Care  Outcome: Ongoing, Progressing     Problem: Skin Injury Risk Increased  Goal: Skin Health and Integrity  Outcome: Ongoing, Progressing  Intervention: Optimize Skin Protection  Flowsheets (Taken 7/5/2022 0431)  Pressure Reduction Techniques: frequent weight shift encouraged  Skin Protection: adhesive use limited  Head of Bed (HOB) Positioning: HOB at 30-45 degrees

## 2022-07-05 NOTE — PROGRESS NOTES
[facility] Medicine Wards History & Physical Note     Resident Team: [facility] Medicine List 1  Attending Physician: Dr. Fuentes  Resident: Dr. Irby    Date of Admit: 2022      History of Present Illness:  Omer Booker is a 66 y.o. male who with a history of CKD 3, heart failure EF 45%, paroxysmal A. Fib, HTN, DM who presented to the ED on 2022  with a primary complaint of Weakness and Vomiting (Pt presents to ed with reports of weakness with n/v starting late last night. Pt reports abd pain and vomiting black liquid. Pt currently hypotensive and escorted to room 3 at this time. )  Patient reports he started vomiting around 6:00 am yesterday morning. Reports five episodes of black emesis, nonbloody. He also reports diffuse abdominal pain without  any radiation. Patient did not take anything for the vomiting or abdominal pain. He had two BM and is also passing gas appropriately. He denies any prior similar episodes. He denies fever, chills, diaphoresis, headache, chest pain.     Interval hx 22:  No acute events overnight. Tolerating NG tube appropriately, with 1050 cc output in the last 24 hrs. He reports his abdomen feels a lot better. He reports he feels like he needs to have  a BM. Denies any emesis episodes since hospital admission. He denies fever, chills, chest pain, abdominal pain, and N/V.       Review of Systems:  See above         Objective:   Last 24 Hour Vital Signs:  BP  Min: 117/73  Max: 168/72  Temp  Av.3 °F (36.8 °C)  Min: 97.7 °F (36.5 °C)  Max: 98.9 °F (37.2 °C)  Pulse  Av.4  Min: 72  Max: 83  Resp  Av  Min: 18  Max: 18  SpO2  Av.2 %  Min: 91 %  Max: 97 %  Body mass index is 33.21 kg/m².  I/O last 3 completed shifts:  In: -   Out: 4190 [Urine:740; Emesis/NG output:100; Drains:2850; Other:500]    Physical Examination:  General: The patient is pleasant and cooperative and in acute distress. NG tube in place.  Head: Skull is normocephalic and atraumatic.  Eyes: Pupils are equal,  round, and reactive to light. Extraocular movements are intact and equal. Anicteric sclera.  Chest: Respirations are non-labored. Clear to auscultation with bilateral breath sounds.   Cardiovascular: Regular rate and rhythm. +2 peripheral pulses.  Abdomen: Soft, non-distended, non jose rafael. No rebound/guarding.   Extremities: Moves all extremities equally and symmetrically.   Neurological: Alert and oriented. No focal neurologic deficits. Answered questions appropriately. No slurred speech.    Laboratory:  Most Recent Data:  CBC:   Lab Results   Component Value Date    WBC 4.1 (L) 07/05/2022    HGB 9.9 (L) 07/05/2022    HCT 31.2 (L) 07/05/2022     07/05/2022    MCV 92.0 07/05/2022    RDW 13.2 07/05/2022     WBC Differential:   Recent Labs   Lab 07/01/22  1215 07/02/22  2030 07/04/22  0438 07/05/22  0502   WBC 5.0 12.3* 7.2 4.1*   HGB 12.2* 13.7* 11.1* 9.9*   HCT 37.3* 41.9* 33.4* 31.2*    355 220 153   MCV 89.0 89.9 89.3 92.0     BMP:   Lab Results   Component Value Date     07/04/2022    K 4.2 07/04/2022    CO2 24 07/04/2022    BUN 26.5 (H) 07/04/2022    CREATININE 2.16 (H) 07/04/2022    CALCIUM 9.9 07/04/2022    MG 1.80 07/04/2022    PHOS 2.9 07/04/2022     LFTs:   Lab Results   Component Value Date    ALBUMIN 3.4 07/04/2022    BILITOT 1.0 07/04/2022    AST 16 07/04/2022    ALKPHOS 62 07/04/2022    ALT 11 07/04/2022     Coags:   Lab Results   Component Value Date    INR 1.16 07/02/2022    PROTIME 14.5 07/02/2022    PTT 30.7 07/02/2022     FLP:   Lab Results   Component Value Date    CHOL 138 01/11/2022    HDL 38 01/11/2022    TRIG 154 (H) 01/11/2022     DM:   Lab Results   Component Value Date    CREATININE 2.16 (H) 07/04/2022     Thyroid:   Lab Results   Component Value Date    TSH 1.849 07/07/2020      Anemia:   Lab Results   Component Value Date    IRON 52 (L) 01/06/2021    TIBC 277 01/06/2021    FERRITIN 32.71 01/06/2021       Lab Results   Component Value Date    XQUQOXAU81 185 (L) 10/22/2018      No results found for: FOLATE     Cardiac:   Lab Results   Component Value Date    TROPONINI 0.016 07/02/2022     Urinalysis:   Lab Results   Component Value Date    COLORU YELLOW 09/03/2021    PHUA 5.5 02/10/2018    NITRITE Negative 09/03/2021    KETONESU Negative 09/03/2021    UROBILINOGEN Normal 09/03/2021    WBCUA 6-10 (A) 09/03/2021       Trended Lab Data:  Recent Labs   Lab 07/02/22 2030 07/03/22 0419 07/04/22  0438 07/05/22  0502   WBC 12.3*  --  7.2 4.1*   HGB 13.7*  --  11.1* 9.9*   HCT 41.9*  --  33.4* 31.2*     --  220 153   MCV 89.9  --  89.3 92.0   RDW 13.3  --  13.4 13.2    141 143  --    K 4.4 4.6 4.2  --    CO2 26 23 24  --    BUN 29.6* 29.8* 26.5*  --    CREATININE 2.27* 1.89* 2.16*  --    ALBUMIN 4.1 3.8 3.4  --    BILITOT 1.0 0.9 1.0  --    AST 16 16 16  --    ALKPHOS 82 77 62  --    ALT 16 11 11  --        Trended Cardiac Data:  Recent Labs   Lab 07/02/22  2250   TROPONINI 0.016       Microbiology Data:  Microbiology Results (last 7 days)     Procedure Component Value Units Date/Time    Blood Culture [[id number]]     Order Status: Canceled Specimen: Venous Blood Line              Radiology:  Imaging Results          CT Abdomen Pelvis  Without Contrast (Final result)  Result time 07/03/22 11:05:37    Final result by Fátima García MD (07/03/22 11:05:37)                 Impression:      1. Dilated small bowel loops with air-fluid levels and gradual/nonfocal transition to decompressed terminal ileum.  Correlate for ileus or partial obstruction.  2. Nonobstructing left ureteral stone.  3. Nonobstructing nephrolithiasis  4. Colonic diverticulosis  Minor discrepancy from preliminary report re: Distal left ureteral stone without hydronephrosis.      Electronically signed by: Fátima García  Date:    07/03/2022  Time:    11:05             Narrative:    EXAMINATION:  CT ABDOMEN PELVIS WITHOUT CONTRAST    CLINICAL HISTORY:  Abdominal pain, acute,  nonlocalized;    TECHNIQUE:  Helically acquired axial images, sagittal and coronal reformations were obtained from the lung bases to the pubic symphysis without the IV administration of contrast.    Automated tube current modulation, weight-based exposure dosing, and/or iterative reconstruction technique utilized to reach lowest reasonably achievable exposure rate.    DLP: 639 mGy*cm    COMPARISON:  Plain radiograph 07/02/2022, CT abdomen pelvis 02/11/2018    FINDINGS:  HEART: There are coronary artery calcifications.    LUNG BASES: Basilar atelectasis.    LIVER: Normal attenuation. No appreciable focal hepatic lesion.    BILIARY: Gallbladder is surgically absent.    PANCREAS: No inflammatory change.    SPLEEN: Normal in size    ADRENALS: No mass.    KIDNEYS/URETERS: There is a 4 mm calculus at the distal left ureter without hydronephrosis.  There are punctate 2-3 mm bilateral renal caliceal calculi.  No hydronephrosis.  No perinephric stranding.  No hydroureter.  Left peripelvic renal cyst, incidental requiring no imaging follow-up.    GI TRACT/MESENTERY: Positive enteric contrast was administered.  Contrast is seen within the stomach.  No contrast within the small bowel.  Small bowel loops are dilated measuring up to 4 cm in diameter with air-fluid levels.  There is a transition to the decompressed terminal ileum with gradual tapering in the right lower quadrant.  The colon is decompressed.  Colonic diverticulosis without acute inflammatory change.    PERITONEUM: No free fluid.No free air.    LYMPH NODES: No enlarged lymph nodes by size criteria.    VASCULATURE: Aortoiliac atherosclerosis.    BLADDER: Normal appearance given degree of distention.    REPRODUCTIVE ORGANS: Normal as visualized.    ABDOMINAL WALL: Unremarkable.    BONES: Lumbar spondylosis most pronounced at L4-L5.    OTHER: Mild gynecomastia                    Preliminary result by Fátima García MD (07/03/22 03:26:57)                  Narrative:    START OF REPORT:  Technique: CT of the abdomen and pelvis was performed with axial images as well as sagittal and coronal reconstruction images without intravenous contrast or oral contrast.    Comparison: None available.    Clinical History: Pt presents to ed with reports of weakness with n/v starting late last night. Pt reports abd pain and vomiting black liquid.    Dosage Information: Automated Exposure Control was utilized.    Findings:  Lines and Tubes: None.  Thorax: The right diaphragm is elevated.  Lungs: Streaky opacities are seen in the right middle lobe, lingula and bilateral lower lobes, which may reflect subsegmental atelectasis versus scarring.  Pleura: No effusions or thickening.  Heart: The heart size is within normal limits.  Abdomen:  Abdominal Wall: No abdominal wall pathology is seen.  Liver: The liver appears unremarkable.  Biliary System: No extrahepatic biliary duct dilatation is seen.  Gallbladder: Surgical clips are seen in the gallbladder fossa consistent with prior cholecystectomy.  Pancreas: The pancreas appears unremarkable.  Spleen: The spleen appears unremarkable.  Adrenals: The adrenal glands appear unremarkable.  Kidneys: The right kidney is small in size when compared to the left. There is a small left renal parapelvic cyst. There are 2-3 mm nonobstructing bilateral renal calculi. No ureteric calculus or ureteral obstruction. The kidneys otherwise appear unremarkable.  Aorta: There is mild calcification of the abdominal aorta and its branches.  IVC: Unremarkable.  Bowel:  Esophagus: The visualized esophagus appears unremarkable.  Stomach: The stomach appears unremarkable.  Duodenum: Unremarkable appearing duodenum.  Small Bowel: There is pronounced the location of nearly the entire small bowel with a relatively rapid transition in the distal/terminal ileum centered on images 57 - 64. Correlate with clinical and laboratory findings as regards additional evaluation and  follow-up.  Colon: The colon is essentially completely decompressed. There are multiple colonic diverticula without evidence of diverticulitis.  Appendix: No appendix is identified.  Peritoneum: No intraperitoneal free air or ascites is seen.    Pelvis:  Bladder: The bladder appears unremarkable.  Male:  Prostate gland: The prostate gland appears unremarkable.  Inguinal Findings:  Inguinal Hernia: Incidental note is made of small uncomplicated mesenteric fat containing bilateral inguinal hernias.    Bony structures:  Dorsal Spine: There is spondylosis of the visualized dorsal spine.      Impression:  1. There is pronounced the location of nearly the entire small bowel with a relatively rapid transition in the distal/terminal ileum centered on images 57 - 64. This is suggestive of distal mechanical small bowel obstruction. Correlate with clinical and laboratory findings as regards additional evaluation and follow-up.  2. Details and other findings as discussed above.                                 X-Ray Chest 1 View (Final result)  Result time 07/02/22 21:17:23    Final result by Marco A Stern MD (07/02/22 21:17:23)                 Impression:      NO ACUTE CARDIOPULMONARY PROCESS IDENTIFIED.      Electronically signed by: Marco A Stern  Date:    07/02/2022  Time:    21:17             Narrative:    EXAMINATION:  XR CHEST 1 VIEW    CLINICAL HISTORY:  abdominal pain;    TECHNIQUE:  One view    COMPARISON:  December 3, 2006.    FINDINGS:  Cardiopericardial silhouette appears mildly enlarged on this portable radiograph.  Lungs are of low volume without dense focal or segmental consolidation, congestive process, pleural effusions or pneumothorax.                               X-Ray Abdomen Flat And Erect (Final result)  Result time 07/03/22 08:37:07    Final result by Fátima García MD (07/03/22 08:37:07)                 Impression:      Dilated small bowel loops with scattered air-fluid levels suggesting  obstruction versus ileus.      Electronically signed by: Fátima García  Date:    07/03/2022  Time:    08:37             Narrative:    EXAMINATION:  XR ABDOMEN FLAT AND ERECT    CLINICAL HISTORY:  Unspecified abdominal pain    TECHNIQUE:  Supine and upright views of the abdomen performed.    COMPARISON:  05/30/2016    FINDINGS:  Gas distended small bowel loops measure up to 5.3 cm in diameter.  There are fluid levels.    No evidence of free intraperitoneal air.    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                                     Assessment & Plan:   Gastritis   SBO  Vomiting, resolved  -Admitted to Medicine  -Surgery consulted, appreciate them follwing  -NG tube in place, collected 2850 cc output since placed on 7/4  -Continue IVF LR at 150 ml/hr   -PPI IV  -Started on Zosyn  -PRN Zofran IV  -NPO  -Bedside commode ordered   -KUB 7/4 showed Enteric contrast has reached the level of the colon.  Persistent dilated small bowel loops.  -KUB 7/3 showed dilated small bowel loops with contrast identified changes are suggestive of a small bowel obstruction no definite contrast identified in the colon. No other significant changes compared with the previous exam  -CT abdomen and pelvis showed There is pronounced the location of nearly the entire small bowel with a relatively rapid transition in the distal/terminal ileum centered on images 57 - 64. This is suggestive of distal mechanical small bowel obstruction. Correlate with clinical and laboratory findings as regards additional evaluation and follow-up.  -CXR revealed Cardiopericardial silhouette appears mildly enlarged on this portable radiograph.  Lungs are of low volume without dense focal or segmental consolidation, congestive process, pleural effusions or pneumothorax.    ERICK  -BUN/Cr 23.2/2.24  -Continue IVF    HTN  -Continue Labetalol 20mg IV q 6hrs   -PRN Hydralazine, holding PO medications    A.fib  -Holding Eliquis and other anticoagulation at this time    -Continue BB at this time       CODE STATUS: FULL  Access: Peripheral IV  Antibiotics: Zosyn  Diet: NPO  DVT Prophylaxis: SCD's  GI Prophylaxis: PPI IV   Fluids: LR at 150 ml/hr      Disposition: Admitted to Medicine service secondary to gastritis and SBO. Surgery placed NG tube on 7/4, output of 2850 cc sine placed. Continue with IVF. Started on Labetalol, Zosyn, PPI, Zofran (PRN). NPO. Will monitor if patient has BM today. Will closely monitor.       Sherry Irby MD  HO-3, FM

## 2022-07-05 NOTE — PT/OT/SLP EVAL
Occupational Therapy   Evaluation    Name: Omer Booker  MRN: [id number]  Admitting Diagnosis:  SBO, weakness, abdominal pain, chest pain, ERICK, uncontrolled DM w/ hyperglycemia, gastritis w/ hemorrhage    Recent Surgery: * No surgery found *     Recommendations:     Discharge Recommendations:  (Home with caregiver.)  Discharge Equipment Recommendations:  none  Barriers to discharge:  None    Assessment:     Omer Booker is a 66 y.o. male with a medical diagnosis of SBO, weakness, abdominal pain, chest pain, ERICK, uncontrolled DM w/ hyperglycemia, gastritis w/ hemorrhage.    (OT eval only as pt at baseline.).      Rehab Prognosis:  OT E only.  Pt at baseline.    Plan:      (OT eval only as pt at baseline.)  · Plan of Care Expires:  (OT eval only as pt at baseline.)  · Plan of Care Reviewed with: patient    Subjective     Chief Complaint: pt c/o not sleeping well last night.  Patient/Family Comments/goals: none stated    Occupational Profile:  Living Environment: Pt lives in 1 story home with his wife, ramp for entry.  Bathroom has tub but doesn't use as he stated he does sponge baths.  Previous level of function: Pt reported he doesn't perform any household chores or meal prep and his wife A him with self care skills as needed.  He doesn't drive or work.  Equipment Used at Home:  walker, rolling, wheelchair, 3-in-1 commode, cane, straight, glucometer (ramp into home, w/c cushion), hospital bed  Assistance upon Discharge: wife to A as needed.    Pain/Comfort:  · Pain Rating 1: 0/10  · Pain Rating Post-Intervention 1: 0/10    Patients cultural, spiritual, Sabianist conflicts given the current situation: no    Objective:     Communicated with: nurse Narvaez prior to session.  Patient found supine with peripheral IV, telemetry, NG tube upon OT entry to room.    General Precautions: Standard, fall   Orthopedic Precautions:N/A   Braces: N/A  Respiratory Status: Room air    Occupational Performance:    Bed Mobility:   "  · Patient completed Rolling/Turning to Right with stand by assistance for positioning to prepare for supine to sit  · Patient completed Supine to/from Sit with stand by assistance, use of bed rail    Functional Mobility/Transfers:  · Patient completed Sit <> Stand Transfer with moderate assistance  with  rolling walker   · Functional Mobility: sit to/from stand with bed in raised position.      Physical Exam:  Dominant hand:  R  Upper Extremity Strength:    -       Right Upper Extremity: WFL  -       Left Upper Extremity: WFL    Treatment & Education:  OT discussed HH.  Pt stated he has had in the past with no results.  He stated he is getting "weaker and weaker."  Education:    Patient left supine with all lines intact, call button in reach and nurse Glenna notified    GOALS:   Multidisciplinary Problems     Occupational Therapy Goals     Not on file                History:     History reviewed. No pertinent past medical history.    No past surgical history on file.    Time Tracking:     OT Date of Treatment: 07/05/22  OT Start Time: 0916  OT Stop Time: 0944  OT Total Time (min): 28 min    Billable Minutes:Evaluation 28 min    7/5/2022    "

## 2022-07-06 VITALS
WEIGHT: 231.5 LBS | BODY MASS INDEX: 33.14 KG/M2 | HEIGHT: 70 IN | RESPIRATION RATE: 16 BRPM | TEMPERATURE: 98 F | HEART RATE: 70 BPM | DIASTOLIC BLOOD PRESSURE: 68 MMHG | OXYGEN SATURATION: 97 % | SYSTOLIC BLOOD PRESSURE: 142 MMHG

## 2022-07-06 DIAGNOSIS — I73.9 PAD (PERIPHERAL ARTERY DISEASE): ICD-10-CM

## 2022-07-06 DIAGNOSIS — K56.609 SBO (SMALL BOWEL OBSTRUCTION): Primary | ICD-10-CM

## 2022-07-06 LAB
ALBUMIN SERPL-MCNC: 2.9 GM/DL (ref 3.4–4.8)
ALBUMIN/GLOB SERPL: 0.9 RATIO (ref 1.1–2)
ALP SERPL-CCNC: 51 UNIT/L (ref 40–150)
ALT SERPL-CCNC: 11 UNIT/L (ref 0–55)
AST SERPL-CCNC: 18 UNIT/L (ref 5–34)
BASOPHILS # BLD AUTO: 0.01 X10(3)/MCL (ref 0–0.2)
BASOPHILS NFR BLD AUTO: 0.3 %
BILIRUBIN DIRECT+TOT PNL SERPL-MCNC: 1.1 MG/DL
BUN SERPL-MCNC: 18.3 MG/DL (ref 8.4–25.7)
CALCIUM SERPL-MCNC: 8.9 MG/DL (ref 8.8–10)
CHLORIDE SERPL-SCNC: 105 MMOL/L (ref 98–107)
CO2 SERPL-SCNC: 25 MMOL/L (ref 23–31)
CREAT SERPL-MCNC: 1.87 MG/DL (ref 0.73–1.18)
EOSINOPHIL # BLD AUTO: 0.13 X10(3)/MCL (ref 0–0.9)
EOSINOPHIL NFR BLD AUTO: 3.5 %
ERYTHROCYTE [DISTWIDTH] IN BLOOD BY AUTOMATED COUNT: 12.9 % (ref 11.5–17)
GLOBULIN SER-MCNC: 3.1 GM/DL (ref 2.4–3.5)
GLUCOSE SERPL-MCNC: 65 MG/DL (ref 82–115)
HCT VFR BLD AUTO: 31.5 % (ref 42–52)
HGB BLD-MCNC: 9.7 GM/DL (ref 14–18)
IMM GRANULOCYTES # BLD AUTO: 0.02 X10(3)/MCL (ref 0–0.04)
IMM GRANULOCYTES NFR BLD AUTO: 0.5 %
LYMPHOCYTES # BLD AUTO: 0.51 X10(3)/MCL (ref 0.6–4.6)
LYMPHOCYTES NFR BLD AUTO: 13.7 %
MAGNESIUM SERPL-MCNC: 1.8 MG/DL (ref 1.6–2.6)
MCH RBC QN AUTO: 28.9 PG (ref 27–31)
MCHC RBC AUTO-ENTMCNC: 30.8 MG/DL (ref 33–36)
MCV RBC AUTO: 93.8 FL (ref 80–94)
MONOCYTES # BLD AUTO: 0.3 X10(3)/MCL (ref 0.1–1.3)
MONOCYTES NFR BLD AUTO: 8.1 %
NEUTROPHILS # BLD AUTO: 2.8 X10(3)/MCL (ref 2.1–9.2)
NEUTROPHILS NFR BLD AUTO: 73.9 %
NRBC BLD AUTO-RTO: 0 %
PHOSPHATE SERPL-MCNC: 2.8 MG/DL (ref 2.3–4.7)
PLATELET # BLD AUTO: 159 X10(3)/MCL (ref 130–400)
PMV BLD AUTO: 9.3 FL (ref 7.4–10.4)
POCT GLUCOSE: 103 MG/DL (ref 70–110)
POCT GLUCOSE: 149 MG/DL (ref 70–110)
POCT GLUCOSE: 63 MG/DL (ref 70–110)
POCT GLUCOSE: 74 MG/DL (ref 70–110)
POTASSIUM SERPL-SCNC: 4.1 MMOL/L (ref 3.5–5.1)
PROT SERPL-MCNC: 6 GM/DL (ref 5.8–7.6)
RBC # BLD AUTO: 3.36 X10(6)/MCL (ref 4.7–6.1)
SODIUM SERPL-SCNC: 142 MMOL/L (ref 136–145)
WBC # SPEC AUTO: 3.7 X10(3)/MCL (ref 4.5–11.5)

## 2022-07-06 PROCEDURE — 36415 COLL VENOUS BLD VENIPUNCTURE: CPT | Performed by: STUDENT IN AN ORGANIZED HEALTH CARE EDUCATION/TRAINING PROGRAM

## 2022-07-06 PROCEDURE — C9113 INJ PANTOPRAZOLE SODIUM, VIA: HCPCS | Performed by: STUDENT IN AN ORGANIZED HEALTH CARE EDUCATION/TRAINING PROGRAM

## 2022-07-06 PROCEDURE — 83735 ASSAY OF MAGNESIUM: CPT | Performed by: STUDENT IN AN ORGANIZED HEALTH CARE EDUCATION/TRAINING PROGRAM

## 2022-07-06 PROCEDURE — 25000003 PHARM REV CODE 250: Performed by: STUDENT IN AN ORGANIZED HEALTH CARE EDUCATION/TRAINING PROGRAM

## 2022-07-06 PROCEDURE — 63600175 PHARM REV CODE 636 W HCPCS: Performed by: STUDENT IN AN ORGANIZED HEALTH CARE EDUCATION/TRAINING PROGRAM

## 2022-07-06 PROCEDURE — 84100 ASSAY OF PHOSPHORUS: CPT | Performed by: STUDENT IN AN ORGANIZED HEALTH CARE EDUCATION/TRAINING PROGRAM

## 2022-07-06 PROCEDURE — 85025 COMPLETE CBC W/AUTO DIFF WBC: CPT | Performed by: STUDENT IN AN ORGANIZED HEALTH CARE EDUCATION/TRAINING PROGRAM

## 2022-07-06 PROCEDURE — 80053 COMPREHEN METABOLIC PANEL: CPT | Performed by: STUDENT IN AN ORGANIZED HEALTH CARE EDUCATION/TRAINING PROGRAM

## 2022-07-06 PROCEDURE — 97116 GAIT TRAINING THERAPY: CPT

## 2022-07-06 RX ORDER — DEXTROMETHORPHAN POLISTIREX 30 MG/5 ML
1 SUSPENSION, EXTENDED RELEASE 12 HR ORAL ONCE
Status: CANCELLED | OUTPATIENT
Start: 2022-07-06 | End: 2022-07-06

## 2022-07-06 RX ADMIN — PIPERACILLIN SODIUM AND TAZOBACTAM SODIUM 4.5 G: 4; .5 INJECTION, POWDER, LYOPHILIZED, FOR SOLUTION INTRAVENOUS at 06:07

## 2022-07-06 RX ADMIN — PANTOPRAZOLE SODIUM 40 MG: 40 INJECTION, POWDER, FOR SOLUTION INTRAVENOUS at 08:07

## 2022-07-06 RX ADMIN — SODIUM CHLORIDE, POTASSIUM CHLORIDE, SODIUM LACTATE AND CALCIUM CHLORIDE: 600; 310; 30; 20 INJECTION, SOLUTION INTRAVENOUS at 08:07

## 2022-07-06 RX ADMIN — DEXTROSE MONOHYDRATE 125 ML: 100 INJECTION, SOLUTION INTRAVENOUS at 06:07

## 2022-07-06 RX ADMIN — PIPERACILLIN SODIUM AND TAZOBACTAM SODIUM 4.5 G: 4; .5 INJECTION, POWDER, LYOPHILIZED, FOR SOLUTION INTRAVENOUS at 12:07

## 2022-07-06 RX ADMIN — SPIRONOLACTONE 12.5 MG: 25 TABLET, FILM COATED ORAL at 08:07

## 2022-07-06 NOTE — PT/OT/SLP PROGRESS
Physical Therapy Treatment    Patient Name:  Omer Booker   MRN:  [id number]    Recommendations:     Discharge Recommendations:  home with home health   Discharge Equipment Recommendations: none   Barriers to discharge: None    Assessment:     Omer Booker is a 66 y.o. male admitted with a medical diagnosis of <principal problem not specified>.  He presents with the following impairments/functional limitations:  weakness, gait instability, decreased lower extremity function, impaired endurance, impaired balance, impaired functional mobilty .    Rehab Prognosis: Fair; patient would benefit from acute skilled PT services to address these deficits and reach maximum level of function.    Recent Surgery: * No surgery found *      Plan:     During this hospitalization, patient to be seen  (3-5xwk) to address the identified rehab impairments via gait training, therapeutic activities and progress toward the following goals:    · Plan of Care Expires:  08/03/22    Subjective     Chief Complaint: no complaints  Patient/Family Comments/goals: return home  Pain/Comfort:  · Pain Rating 1: 0/10  · Pain Rating Post-Intervention 1: 0/10      Objective:     Communicated with nurse Edwards prior to session.  Patient found supine with peripheral IV, NG tube upon PT entry to room.     General Precautions: Standard, fall   Orthopedic Precautions:N/A   Braces: N/A  Respiratory Status: Room air     Functional Mobility:  · Bed Mobility:     · Supine to Sit: supervision  · Transfers:     · Sit to Stand:  supervision with rolling walker  · Gait: Pt. ambulated with 2 person CGA using RW x130 ft with wc in tow for safety with CNA. No buckling of knees or loss of balance this session.   · Balance: Fair    Patient left supine with all lines intact, call button in reach and Grace notified..    GOALS:   Multidisciplinary Problems     Physical Therapy Goals        Problem: Physical Therapy    Goal Priority Disciplines Outcome Goal Variances  Interventions   Physical Therapy Goal     PT, PT/OT Ongoing, Progressing     Description: Goals to be met by: Discharge     Patient will increase functional independence with mobility by performin. Supine to sit with Stand-by Assistance  2. Sit to supine with Stand-by Assistance  3. Sit to stand transfer with Stand-by Assistance  4. Gait  x 50 feet with Stand-by Assistance using Rolling Walker.                      Time Tracking:     PT Received On: 22  PT Start Time: 1029     PT Stop Time: 1042  PT Total Time (min): 13 min     Billable Minutes: Gait Training 13    Treatment Type: Treatment  PT/PTA: PT           2022

## 2022-07-06 NOTE — PLAN OF CARE
New consult for RW received. Referral sent to [facility] via [employer]. Prescription and H&P faxed to [facility] at [phone].

## 2022-07-06 NOTE — PROGRESS NOTES
[facility] General Surgery Progress Note    Subjective: sheila SALVADOR continues to pass flatus but has yet to have a BM. 350 cc bilious output from NG overnight.     Objective  Temp:  [97.5 °F (36.4 °C)-99 °F (37.2 °C)] 97.9 °F (36.6 °C)  Pulse:  [60-75] 63  Resp:  [18-20] 20  SpO2:  [95 %-97 %] 96 %  BP: (130-164)/(66-79) 150/76    I/O this shift:  In: -   Out: 750 [Urine:400; Other:350]  I/O last 3 completed shifts:  In: 1500 [I.V.:1500]  Out: 3150 [Urine:1800; Drains:1350]    Gen: NAD, AAOx3  CV: regular rate, palpable radials  Pulm: nonlabored, no increased wob, equal chest rise   Abd: s/nt/nd, NGT in place on wall suction, bilious output       Labs:      Recent Labs   Lab 07/02/22 2031 07/03/22  0419 07/04/22  0438 07/05/22  0502 07/06/22  0413   WBC  --   --  7.2 4.1*  --    HGB  --   --  11.1* 9.9*  --    HCT  --   --  33.4* 31.2*  --    PLT  --   --  220 153  --    INR 1.16  --   --   --   --    NA  --    < > 143 144 142   CHLORIDE  --    < > 106 106 105   CO2  --    < > 24 28 25   BUN  --    < > 26.5* 23.2 18.3   CREATININE  --    < > 2.16* 2.24* 1.87*   GLUCOSE  --    < > 127* 98 65*   CALCIUM  --    < > 9.9 9.0 8.9   MG  --    < > 1.80 2.20 1.80   PHOS  --    < > 2.9 3.0 2.8   ALKPHOS  --    < > 62 58 51    < > = values in this interval not displayed.         Imaging:     EXAMINATION:  XR ABDOMEN AP 1 VIEW    Date:                                            07/05/2022  Time:                                           09:06    Impression:     Enteric contrast has reached the level of the colon.     Persistent dilated small bowel loops.    A/P:    66 y.o. male 66 y.o. male PMH of A-fib on Eliquis, HTN, DM, CKD stage 3, and a lap isai in 2016 who presents with n/v and an SBO vs ileus vs gastroparesis.     NPO  Continue fluid resuscitation   Recommend GI consult   Recommend Reglan  Plan on removing NGT later today  No surgical intervention indicated at this time   Rest of care per primary team    Maria Maradiaga MD   [facility]  General Surgery PGY1    PGY-3 Addnedum  Patient with ileus vs gastroparesis, NG-tube output 350 mL overnight, lighter in color. Patient reports no nausea or abdominal pain. Passing flatus, no BM. Will possibly pull NG-tube later today. Recommend reglan and GI consult for further workup.    Awilda Rojas MD  [facility] General Surgery, PGY-3

## 2022-07-06 NOTE — DISCHARGE SUMMARY
[facility] Surg Telemetry  Discharge Note  Short Stay  ADMIT DATE: 7/4/22     DISCHARGE DATE: 7/6/22     FINAL DIAGNOSIS:  SBO     FOLLOW UP: [facility] will call patient to schedule f/u visit      HOSPITAL ADMISSION: Omer Booker is a 66 y.o. male who with a history of CKD 3, heart failure EF 45%, paroxysmal A. Fib, HTN, DM who presented to the ED on 7/2/2022  with a primary complaint of Weakness and Vomiting (Pt presents to ed with reports of weakness with n/v starting late last night. Pt reports abd pain and vomiting black liquid. Pt currently hypotensive and escorted to room 3 at this time. ) Patient reports he started vomiting around 6:00 am yesterday morning. Reports five episodes of black emesis, nonbloody. He also reports diffuse abdominal pain without  any radiation. Patient did not take anything for the vomiting or abdominal pain. He had two BM and is also passing gas appropriately. He denies any prior similar episodes.       HOSPITAL COURSE: Patient was made NPO. NG tube placed on 7/4. He was started on IVF LR at 150 ml/hr; PPI IV; IV Zosyn; and IV labetalol.KUB 7/4 showed Enteric contrast has reached the level of the colon. Persistent dilated small bowel loops. KUB 7/3 showed dilated small bowel loops with contrast identified changes are suggestive of a small bowel obstruction no definite contrast identified in the colon. No other significant changes compared with the previous exam. CT abdomen and pelvis showed There is pronounced the location of nearly the entire small bowel with a relatively rapid transition in the distal/terminal ileum centered on images 57 - 64. This is suggestive of distal mechanical small bowel obstruction. Correlate with clinical and laboratory findings as regards additional evaluation and follow-up. CXR revealed Cardiopericardial silhouette appears mildly enlarged on this portable radiograph.  Lungs are of low volume without dense focal or segmental  consolidation, congestive process, pleural effusions or pneumothorax.   On day of discharge, Patient had a BM; NG tube was pulled. Patient's diet was advanced and tolerated PO intake appropriately.        PE:  General: The patient is pleasant and cooperative and in acute distress. NG tube in place.  Head: Skull is normocephalic and atraumatic.  Eyes: Pupils are equal, round, and reactive to light. Extraocular movements are intact and equal. Anicteric sclera.  Chest: Respirations are non-labored. Clear to auscultation with bilateral breath sounds.   Cardiovascular: Regular rate and rhythm. +2 peripheral pulses.  Abdomen: Soft, non-distended, non jose rafael. No rebound/guarding.   Extremities: Moves all extremities equally and symmetrically.   Neurological: Alert and oriented. No focal neurologic deficits. Answered questions appropriately. No slurred speech.      DISCHARGE DISPOSITION: He is hemodynamically stable. His vitals WNL. He felt better.    DISCHARGE INSTRUCTIONS:  GI referral was placed for outpatient f/u. Patient will call PCP to schedule 1 week f/u.  If symptoms return or worsen, return to ED.     TIME SPENT ON DISCHARGE: >35 minutes

## 2022-07-07 NOTE — PT/OT/SLP DISCHARGE
Physical Therapy Discharge Summary    Name: Omer Booker  MRN: [id number]   Principal Problem: Small bowel obstruction     Patient Discharged from acute Physical Therapy on 2022  .  Please refer to prior PT note for functional status.     Assessment:     Patient was discharged unexpectedly.  Information required to complete an accurate discharge summary is unknown.  Refer to therapy initial evaluation and last progress note for initial and most recent functional status and goal achievement.  Recommendations made may be found in medical record.    Objective:     GOALS:   Multidisciplinary Problems     Physical Therapy Goals        Problem: Physical Therapy    Goal Priority Disciplines Outcome Goal Variances Interventions   Physical Therapy Goal     PT, PT/OT Ongoing, Progressing     Description: Goals to be met by: Discharge     Patient will increase functional independence with mobility by performin. Supine to sit with Stand-by Assistance ,met  2. Sit to supine with Stand-by Assistance, met  3. Sit to stand transfer with Stand-by Assistance, met  4. Gait  x 50 feet with Stand-by Assistance using Rolling Walker. Not met                     Reasons for Discontinuation of Therapy Services  DC from the hospital      Plan:     Patient Discharged to: Home with Home Health Service.      2022

## 2022-07-08 ENCOUNTER — OFFICE VISIT (OUTPATIENT)
Dept: NEPHROLOGY | Facility: CLINIC | Age: 67
End: 2022-07-08
Payer: MEDICARE

## 2022-07-08 ENCOUNTER — HOSPITAL ENCOUNTER (EMERGENCY)
Facility: HOSPITAL | Age: 67
Discharge: HOME | End: 2022-07-08
Attending: INTERNAL MEDICINE
Payer: MEDICARE

## 2022-07-08 ENCOUNTER — PATIENT OUTREACH (OUTPATIENT)
Dept: ADMINISTRATIVE | Facility: CLINIC | Age: 67
End: 2022-07-08
Payer: MEDICARE

## 2022-07-08 VITALS
SYSTOLIC BLOOD PRESSURE: 92 MMHG | DIASTOLIC BLOOD PRESSURE: 60 MMHG | OXYGEN SATURATION: 97 % | TEMPERATURE: 98 F | RESPIRATION RATE: 20 BRPM | BODY MASS INDEX: 33.14 KG/M2 | HEART RATE: 85 BPM | HEIGHT: 70 IN | WEIGHT: 231.5 LBS

## 2022-07-08 VITALS
TEMPERATURE: 98 F | HEIGHT: 70 IN | BODY MASS INDEX: 33.14 KG/M2 | SYSTOLIC BLOOD PRESSURE: 161 MMHG | OXYGEN SATURATION: 100 % | WEIGHT: 231.5 LBS | HEART RATE: 67 BPM | DIASTOLIC BLOOD PRESSURE: 90 MMHG | RESPIRATION RATE: 16 BRPM

## 2022-07-08 DIAGNOSIS — R53.1 WEAKNESS: ICD-10-CM

## 2022-07-08 DIAGNOSIS — N18.9 CHRONIC KIDNEY DISEASE, UNSPECIFIED CKD STAGE: Primary | ICD-10-CM

## 2022-07-08 DIAGNOSIS — I10 HYPERTENSION, UNSPECIFIED TYPE: ICD-10-CM

## 2022-07-08 DIAGNOSIS — E11.69 TYPE 2 DIABETES MELLITUS WITH OTHER SPECIFIED COMPLICATION, UNSPECIFIED WHETHER LONG TERM INSULIN USE: ICD-10-CM

## 2022-07-08 DIAGNOSIS — E86.0 DEHYDRATION, MODERATE: ICD-10-CM

## 2022-07-08 DIAGNOSIS — N17.9 AKI (ACUTE KIDNEY INJURY): Primary | ICD-10-CM

## 2022-07-08 LAB
ALBUMIN SERPL-MCNC: 3.5 GM/DL (ref 3.4–4.8)
ALBUMIN/GLOB SERPL: 0.9 RATIO (ref 1.1–2)
ALP SERPL-CCNC: 69 UNIT/L (ref 40–150)
ALT SERPL-CCNC: 18 UNIT/L (ref 0–55)
AST SERPL-CCNC: 23 UNIT/L (ref 5–34)
BASOPHILS # BLD AUTO: 0.04 X10(3)/MCL (ref 0–0.2)
BASOPHILS NFR BLD AUTO: 0.5 %
BILIRUBIN DIRECT+TOT PNL SERPL-MCNC: 0.8 MG/DL
BUN SERPL-MCNC: 13.5 MG/DL (ref 8.4–25.7)
CALCIUM SERPL-MCNC: 9.1 MG/DL (ref 8.8–10)
CHLORIDE SERPL-SCNC: 101 MMOL/L (ref 98–107)
CO2 SERPL-SCNC: 24 MMOL/L (ref 23–31)
CREAT SERPL-MCNC: 2.18 MG/DL (ref 0.73–1.18)
EOSINOPHIL # BLD AUTO: 0.19 X10(3)/MCL (ref 0–0.9)
EOSINOPHIL NFR BLD AUTO: 2.6 %
ERYTHROCYTE [DISTWIDTH] IN BLOOD BY AUTOMATED COUNT: 12.6 % (ref 11.5–17)
GLOBULIN SER-MCNC: 4 GM/DL (ref 2.4–3.5)
GLUCOSE SERPL-MCNC: 172 MG/DL (ref 82–115)
HCT VFR BLD AUTO: 37.6 % (ref 42–52)
HGB BLD-MCNC: 12.5 GM/DL (ref 14–18)
IMM GRANULOCYTES # BLD AUTO: 0.19 X10(3)/MCL (ref 0–0.04)
IMM GRANULOCYTES NFR BLD AUTO: 2.6 %
LYMPHOCYTES # BLD AUTO: 1.1 X10(3)/MCL (ref 0.6–4.6)
LYMPHOCYTES NFR BLD AUTO: 15.1 %
MAGNESIUM SERPL-MCNC: 1.6 MG/DL (ref 1.6–2.6)
MCH RBC QN AUTO: 29.5 PG (ref 27–31)
MCHC RBC AUTO-ENTMCNC: 33.2 MG/DL (ref 33–36)
MCV RBC AUTO: 88.7 FL (ref 80–94)
MONOCYTES # BLD AUTO: 0.58 X10(3)/MCL (ref 0.1–1.3)
MONOCYTES NFR BLD AUTO: 8 %
NEUTROPHILS # BLD AUTO: 5.2 X10(3)/MCL (ref 2.1–9.2)
NEUTROPHILS NFR BLD AUTO: 71.2 %
NRBC BLD AUTO-RTO: 0 %
PHOSPHATE SERPL-MCNC: 1.9 MG/DL (ref 2.3–4.7)
PLATELET # BLD AUTO: 308 X10(3)/MCL (ref 130–400)
PMV BLD AUTO: 9.7 FL (ref 7.4–10.4)
POCT GLUCOSE: 183 MG/DL (ref 70–110)
POTASSIUM SERPL-SCNC: 3.7 MMOL/L (ref 3.5–5.1)
PROT SERPL-MCNC: 7.5 GM/DL (ref 5.8–7.6)
RBC # BLD AUTO: 4.24 X10(6)/MCL (ref 4.7–6.1)
SODIUM SERPL-SCNC: 134 MMOL/L (ref 136–145)
WBC # SPEC AUTO: 7.3 X10(3)/MCL (ref 4.5–11.5)

## 2022-07-08 PROCEDURE — 82962 GLUCOSE BLOOD TEST: CPT

## 2022-07-08 PROCEDURE — 99214 OFFICE O/P EST MOD 30 MIN: CPT | Mod: PBBFAC,25 | Performed by: INTERNAL MEDICINE

## 2022-07-08 PROCEDURE — 96360 HYDRATION IV INFUSION INIT: CPT

## 2022-07-08 PROCEDURE — 83735 ASSAY OF MAGNESIUM: CPT | Performed by: INTERNAL MEDICINE

## 2022-07-08 PROCEDURE — 99284 EMERGENCY DEPT VISIT MOD MDM: CPT | Mod: 25,27

## 2022-07-08 PROCEDURE — 63600175 PHARM REV CODE 636 W HCPCS: Performed by: INTERNAL MEDICINE

## 2022-07-08 PROCEDURE — 96361 HYDRATE IV INFUSION ADD-ON: CPT

## 2022-07-08 PROCEDURE — 93005 ELECTROCARDIOGRAM TRACING: CPT

## 2022-07-08 PROCEDURE — 84100 ASSAY OF PHOSPHORUS: CPT | Performed by: INTERNAL MEDICINE

## 2022-07-08 PROCEDURE — 36415 COLL VENOUS BLD VENIPUNCTURE: CPT | Performed by: INTERNAL MEDICINE

## 2022-07-08 PROCEDURE — 80053 COMPREHEN METABOLIC PANEL: CPT | Performed by: INTERNAL MEDICINE

## 2022-07-08 PROCEDURE — 85025 COMPLETE CBC W/AUTO DIFF WBC: CPT | Performed by: INTERNAL MEDICINE

## 2022-07-08 RX ORDER — METFORMIN HYDROCHLORIDE 500 MG/1
500 TABLET, EXTENDED RELEASE ORAL 2 TIMES DAILY
COMMUNITY
Start: 2022-07-05 | End: 2023-01-12

## 2022-07-08 RX ORDER — AMLODIPINE BESYLATE 10 MG/1
10 TABLET ORAL DAILY
COMMUNITY
Start: 2022-06-14 | End: 2023-01-12

## 2022-07-08 RX ORDER — ATORVASTATIN CALCIUM 10 MG/1
10 TABLET, FILM COATED ORAL DAILY
COMMUNITY
Start: 2022-04-18

## 2022-07-08 RX ORDER — ALLOPURINOL 100 MG/1
TABLET ORAL
COMMUNITY
Start: 2022-04-08 | End: 2022-09-23

## 2022-07-08 RX ORDER — LISINOPRIL 40 MG/1
40 TABLET ORAL DAILY
COMMUNITY
Start: 2022-01-13 | End: 2022-07-08 | Stop reason: ALTCHOICE

## 2022-07-08 RX ORDER — APIXABAN 5 MG/1
5 TABLET, FILM COATED ORAL 2 TIMES DAILY
COMMUNITY
Start: 2022-04-10

## 2022-07-08 RX ORDER — SPIRONOLACTONE 25 MG/1
12.5 TABLET ORAL DAILY
COMMUNITY
Start: 2022-07-05

## 2022-07-08 RX ORDER — METOPROLOL SUCCINATE 100 MG/1
50 TABLET, EXTENDED RELEASE ORAL DAILY
COMMUNITY
Start: 2022-04-12

## 2022-07-08 RX ADMIN — SODIUM CHLORIDE, POTASSIUM CHLORIDE, SODIUM LACTATE AND CALCIUM CHLORIDE 500 ML: 600; 310; 30; 20 INJECTION, SOLUTION INTRAVENOUS at 03:07

## 2022-07-08 RX ADMIN — SODIUM CHLORIDE, POTASSIUM CHLORIDE, SODIUM LACTATE AND CALCIUM CHLORIDE 750 ML: 600; 310; 30; 20 INJECTION, SOLUTION INTRAVENOUS at 03:07

## 2022-07-08 RX ADMIN — SODIUM CHLORIDE, POTASSIUM CHLORIDE, SODIUM LACTATE AND CALCIUM CHLORIDE 500 ML: 600; 310; 30; 20 INJECTION, SOLUTION INTRAVENOUS at 02:07

## 2022-07-08 RX ADMIN — SODIUM CHLORIDE, POTASSIUM CHLORIDE, SODIUM LACTATE AND CALCIUM CHLORIDE 250 ML: 600; 310; 30; 20 INJECTION, SOLUTION INTRAVENOUS at 12:07

## 2022-07-08 NOTE — PROGRESS NOTES
Patient is currently in the ED as of 7/8/2022 @ 1110, call not made, will recheck tomorrow to see if patient was admitted or not.

## 2022-07-08 NOTE — PROGRESS NOTES
[facility] INTERNAL MEDICINE  OUTPATIENT OFFICE VISIT NOTE    SUBJECTIVE:      Chief Complaint: Chronic Kidney Disease (Follow up)       HPI: Omer Booker is a 66 y.o. yo male w/ PMH of  has a past medical history of BPH (benign prostatic hyperplasia), CKD (chronic kidney disease) stage 3, GFR 30-59 ml/min, Gout, unspecified, Heart failure, unspecified, HTN (hypertension), Obesity, unspecified, and Paroxysmal atrial fibrillation., who presents for follow-up for CKD stage III, HTN, and DM. Pt reports of being more tired than usual and being very sleepy. Pt also reports of being very SOB but denies any CP, N/V, fever or chills.     Pt reports of being complaint with all home medications and following a low salt diet. Pt does not record the. Discussed improving kidney function this visit and follow-up in 4 months.       Pt was recently in the hospital for small bowel obstruction from 7/4/22 to 7/622, and has been more tired since that visit. Patients blood pressure during triage was 81/56 and repeat 92/60 on manual blood pressure cuff. Unable to due orthostatics as patient is in a wheel chair and unable to stand up. Due to symptomatic hypotension with fatigued and SOB, patient was taken to the ED for further evaluation by SALINA Ramires.          Past Medical History:   has a past medical history of BPH (benign prostatic hyperplasia), CKD (chronic kidney disease) stage 3, GFR 30-59 ml/min, Gout, unspecified, Heart failure, unspecified, HTN (hypertension), Obesity, unspecified, and Paroxysmal atrial fibrillation.     Past Surgical History:   has a past surgical history that includes Cholecystectomy; Bronchoscopy; Bone marrow biopsy; and wisdom teeth removal.     Family History:  family history includes Diabetes in his mother; Heart attack in his mother; Heart disease in his father; Kidney disease in his father.     Social History:   reports that he has never smoked. He has never used smokeless tobacco. He reports that he  "does not drink alcohol and does not use drugs.     Allergies:  is allergic to lisinopril.     Home Medications:  Current Outpatient Medications   Medication Instructions    allopurinoL (ZYLOPRIM) 100 MG tablet TAKE 1 TABLET BY MOUTH DAILY TO GOUT PREVENTION    amLODIPine (NORVASC) 10 mg, Oral, Daily    atorvastatin (LIPITOR) 10 mg, Oral, Daily    ELIQUIS 5 mg, Oral, 2 times daily    metFORMIN (GLUCOPHAGE-XR) 500 mg, Oral, 2 times daily    metoprolol succinate (TOPROL-XL) 50 mg, Oral, Daily    spironolactone (ALDACTONE) 12.5 mg, Oral, Daily        ROS:  Review of Systems   Constitutional: Positive for malaise/fatigue. Negative for chills and fever.   Respiratory: Positive for shortness of breath. Negative for cough.    Cardiovascular: Negative for chest pain, palpitations and leg swelling.   Gastrointestinal: Negative for abdominal pain, constipation, diarrhea, nausea and vomiting.   Genitourinary: Negative for dysuria, frequency, hematuria and urgency.   Skin: Negative for rash.   Neurological: Negative for dizziness and headaches.            OBJECTIVE:     Vital signs:   BP 92/60 (BP Location: Right arm, Patient Position: Sitting, BP Method: Medium (Manual))   Pulse 85   Temp 98.2 °F (36.8 °C) (Oral)   Resp 20   Ht 5' 10" (1.778 m)   Wt 105 kg (231 lb 7.7 oz)   SpO2 97%   BMI 33.21 kg/m²      Physical Examination:  BP 92/60 (BP Location: Right arm, Patient Position: Sitting, BP Method: Medium (Manual))   Pulse 85   Temp 98.2 °F (36.8 °C) (Oral)   Resp 20   Ht 5' 10" (1.778 m)   Wt 105 kg (231 lb 7.7 oz)   SpO2 97%   BMI 33.21 kg/m²   General appearance: alert, appears stated age, cooperative, fatigued and no distress  Head: Normocephalic, without obvious abnormality, atraumatic  Lungs: clear to auscultation bilaterally  Chest wall: no tenderness  Heart: regular rate and rhythm, S1, S2 normal, no murmur, click, rub or gallop  Abdomen: soft, non-tender; bowel sounds normal; no masses,  no " organomegaly  Extremities: extremities normal, atraumatic, no cyanosis or edema        ASSESSMENT & PLAN:     CKD Stage II  - Cr of 1.87 on 7/6, improving from 1.95 on 7/1  - continue renal protective diet  - Avoid nephrotoxic drugs    Diabetes Mellitis  - On metformin 500 mg  - follow-up with PCP for A1c  - monitor glucose at home    HTN  - on amlodipine 10 mg  - home blood pressure recordings range from 120-130 systolic over 70-80 diastolic.   - Symptomatic hypotension in clinic, will send to ED.    A-fib   - on metoprolol 100 mg  - controlled, reports no symptoms today      Return to clinic in 4 month(s).

## 2022-07-08 NOTE — ED PROVIDER NOTES
Encounter Date: 7/8/2022       History     Chief Complaint   Patient presents with    Weakness    Hypotension     Sent over from nephrology clinic. States pt in for check up and got diaphoretic with low bp. Pt c/o extreme weakness and fatigue. States in hospital this week for bowel obstruction. Bp 87/67. Cbg 183.      Presents with weakness, recently discharge after admitted secondary to SBO. States after discharge he is eating and drinking but not sure if enough. No vomiting but have some episodes of diarrhea    The history is provided by the patient and a relative.     Review of patient's allergies indicates:   Allergen Reactions    Lisinopril Swelling     Past Medical History:   Diagnosis Date    BPH (benign prostatic hyperplasia)     CKD (chronic kidney disease) stage 3, GFR 30-59 ml/min     Gout, unspecified     Heart failure, unspecified     HTN (hypertension)     Obesity, unspecified     Paroxysmal atrial fibrillation      Past Surgical History:   Procedure Laterality Date    BONE MARROW BIOPSY      BRONCHOSCOPY      CHOLECYSTECTOMY      wisdom teeth removal       Family History   Problem Relation Age of Onset    Diabetes Mother     Heart attack Mother     Kidney disease Father     Heart disease Father      Social History     Tobacco Use    Smoking status: Never Smoker    Smokeless tobacco: Never Used   Substance Use Topics    Alcohol use: Never    Drug use: Never     Review of Systems   Constitutional: Positive for fatigue. Negative for fever.   HENT: Negative for sore throat.    Respiratory: Negative for shortness of breath.    Cardiovascular: Negative for chest pain.   Gastrointestinal: Positive for diarrhea. Negative for nausea.   Genitourinary: Negative for dysuria.   Musculoskeletal: Negative for back pain.   Skin: Negative for rash.   Neurological: Positive for weakness.   Hematological: Does not bruise/bleed easily.   All other systems reviewed and are negative.      Physical  Exam     Initial Vitals [07/08/22 1113]   BP Pulse Resp Temp SpO2   (!) 87/37 78 20 97.9 °F (36.6 °C) 96 %      MAP       --         Physical Exam    Nursing note and vitals reviewed.  Constitutional: He appears well-developed and well-nourished. No distress.   HENT:   Head: Normocephalic and atraumatic.   Eyes: Conjunctivae are normal. Pupils are equal, round, and reactive to light.   Neck: Neck supple.   Normal range of motion.  Cardiovascular: Normal rate, regular rhythm, normal heart sounds and intact distal pulses.   Pulmonary/Chest: Breath sounds normal. No respiratory distress.   Abdominal: Abdomen is soft. Bowel sounds are normal. He exhibits no distension. There is no abdominal tenderness. There is no rebound and no guarding.   Musculoskeletal:         General: No edema. Normal range of motion.      Cervical back: Normal range of motion and neck supple.     Neurological: He is alert and oriented to person, place, and time. He has normal strength.   Skin: Skin is warm and dry. No rash noted.   Psychiatric: His behavior is normal.         ED Course   Procedures  Labs Reviewed   COMPREHENSIVE METABOLIC PANEL - Abnormal; Notable for the following components:       Result Value    Sodium Level 134 (*)     Glucose Level 172 (*)     Creatinine 2.18 (*)     Globulin 4.0 (*)     Albumin/Globulin Ratio 0.9 (*)     All other components within normal limits   CBC WITH DIFFERENTIAL - Abnormal; Notable for the following components:    RBC 4.24 (*)     Hgb 12.5 (*)     Hct 37.6 (*)     IG# 0.19 (*)     All other components within normal limits   PHOSPHORUS - Abnormal; Notable for the following components:    Phosphorus Level 1.9 (*)     All other components within normal limits   POCT GLUCOSE - Abnormal; Notable for the following components:    POCT Glucose 183 (*)     All other components within normal limits   MAGNESIUM - Normal   CBC W/ AUTO DIFFERENTIAL    Narrative:     The following orders were created for panel  order CBC auto differential.  Procedure                               Abnormality         Status                     ---------                               -----------         ------                     CBC with Differential[[id number]]        Abnormal            Final result                 Please view results for these tests on the individual orders.   EXTRA TUBES    Narrative:     The following orders were created for panel order EXTRA TUBES.  Procedure                               Abnormality         Status                     ---------                               -----------         ------                     Light Blue Top Hold[[id number]]                              In process                 Red Top Hold[[id number]]                                     In process                 Gold Top Hold[[id number]]                                    In process                 Pink Top Hold[[id number]]                                    In process                   Please view results for these tests on the individual orders.   LIGHT BLUE TOP HOLD   RED TOP HOLD   GOLD TOP HOLD   PINK TOP HOLD     EKG Readings: (Independently Interpreted)   Initial Reading: No STEMI. Heart Rate: 62. Other Impression: LVH     ECG Results          EKG 12-lead (In process)  Result time 07/08/22 14:21:24    In process by Interface, Lab In [facility] (07/08/22 14:21:24)                 Narrative:    Test Reason : R53.1,    Vent. Rate : 062 BPM     Atrial Rate : 062 BPM     P-R Int : 228 ms          QRS Dur : 108 ms      QT Int : 434 ms       P-R-T Axes : 038 -12 032 degrees     QTc Int : 440 ms    Sinus rhythm with 1st degree A-V block  Voltage criteria for left ventricular hypertrophy  Cannot rule out Septal infarct ,age undetermined  Abnormal ECG  When compared with ECG of 02-JUL-2022 23:03,  Minimal criteria for Septal infarct are now Present  Nonspecific T wave abnormality, improved in Lateral leads    Referred By: [email or web address]   SELF            Confirmed By:                             Imaging Results    None          Medications   lactated ringers bolus 750 mL (750 mLs Intravenous New Bag 7/8/22 1545)   lactated ringers bolus 500 mL (0 mLs Intravenous Stopped 7/8/22 1645)   lactated ringers bolus 250 mL (0 mLs Intravenous Stopped 7/8/22 1323)   lactated ringers bolus 500 mL (0 mLs Intravenous Stopped 7/8/22 1538)                          Clinical Impression:   Final diagnoses:  [R53.1] Weakness  [N17.9] ERICK (acute kidney injury) (Primary)  [E86.0] Dehydration, moderate          ED Disposition Condition    Discharge Stable        ED Prescriptions     None        Follow-up Information     Follow up With Specialties Details Why Contact Info    Luis Eduardo Pulliam MD Family Medicine In 1 week  [address]  [facility] [address]  [phone]      [facility] Dept Emergency Medicine  If symptoms worsen [address] [address] [phone]           Fadi Goel MD  07/08/22 4780

## 2022-07-11 NOTE — PROGRESS NOTES
C3 nurse spoke with name for a TCC post hospital discharge follow up call. The patient reports does not have a scheduled HOSFU appointment. C3 nurse was unable to schedule HOSFU appointment for [facility] PCP. Patient advised to contact their PCP to schedule a HOSPFU within 7-14 days. Patient states he will wait a little while to call and make his appointment, maybe within 2 weeks.

## 2022-08-04 ENCOUNTER — HOSPITAL ENCOUNTER (EMERGENCY)
Facility: HOSPITAL | Age: 67
Discharge: HOME | End: 2022-08-04
Attending: FAMILY MEDICINE
Payer: MEDICARE

## 2022-08-04 VITALS
TEMPERATURE: 98 F | HEART RATE: 72 BPM | WEIGHT: 227.31 LBS | HEIGHT: 70 IN | OXYGEN SATURATION: 99 % | SYSTOLIC BLOOD PRESSURE: 149 MMHG | RESPIRATION RATE: 16 BRPM | DIASTOLIC BLOOD PRESSURE: 93 MMHG | BODY MASS INDEX: 32.54 KG/M2

## 2022-08-04 DIAGNOSIS — R55 SYNCOPE, UNSPECIFIED SYNCOPE TYPE: Primary | ICD-10-CM

## 2022-08-04 DIAGNOSIS — R06.00 DYSPNEA: ICD-10-CM

## 2022-08-04 LAB
ALBUMIN SERPL-MCNC: 3.9 GM/DL (ref 3.4–4.8)
ALBUMIN/GLOB SERPL: 1 RATIO (ref 1.1–2)
ALP SERPL-CCNC: 81 UNIT/L (ref 40–150)
ALT SERPL-CCNC: 12 UNIT/L (ref 0–55)
APPEARANCE UR: CLEAR
AST SERPL-CCNC: 14 UNIT/L (ref 5–34)
BACTERIA #/AREA URNS AUTO: ABNORMAL /HPF
BASOPHILS # BLD AUTO: 0.02 X10(3)/MCL (ref 0–0.2)
BASOPHILS NFR BLD AUTO: 0.3 %
BILIRUB UR QL STRIP.AUTO: NEGATIVE MG/DL
BILIRUBIN DIRECT+TOT PNL SERPL-MCNC: 0.6 MG/DL
BNP BLD-MCNC: 40.1 PG/ML
BUN SERPL-MCNC: 18.6 MG/DL (ref 8.4–25.7)
CALCIUM SERPL-MCNC: 9.9 MG/DL (ref 8.8–10)
CHLORIDE SERPL-SCNC: 103 MMOL/L (ref 98–107)
CK MB SERPL-MCNC: 1 NG/ML
CK SERPL-CCNC: 20 U/L (ref 30–200)
CO2 SERPL-SCNC: 20 MMOL/L (ref 23–31)
COLOR UR AUTO: YELLOW
CREAT SERPL-MCNC: 1.81 MG/DL (ref 0.73–1.18)
EOSINOPHIL # BLD AUTO: 0.12 X10(3)/MCL (ref 0–0.9)
EOSINOPHIL NFR BLD AUTO: 1.9 %
ERYTHROCYTE [DISTWIDTH] IN BLOOD BY AUTOMATED COUNT: 12.5 % (ref 11.5–17)
FLUAV AG UPPER RESP QL IA.RAPID: NOT DETECTED
FLUBV AG UPPER RESP QL IA.RAPID: NOT DETECTED
GLOBULIN SER-MCNC: 3.9 GM/DL (ref 2.4–3.5)
GLUCOSE SERPL-MCNC: 159 MG/DL (ref 82–115)
GLUCOSE UR QL STRIP.AUTO: NORMAL MG/DL
HCT VFR BLD AUTO: 37.1 % (ref 42–52)
HGB BLD-MCNC: 12.7 GM/DL (ref 14–18)
HYALINE CASTS #/AREA URNS LPF: ABNORMAL /LPF
IMM GRANULOCYTES # BLD AUTO: 0.05 X10(3)/MCL (ref 0–0.04)
IMM GRANULOCYTES NFR BLD AUTO: 0.8 %
KETONES UR QL STRIP.AUTO: NEGATIVE MG/DL
LEUKOCYTE ESTERASE UR QL STRIP.AUTO: NEGATIVE UNIT/L
LYMPHOCYTES # BLD AUTO: 1.52 X10(3)/MCL (ref 0.6–4.6)
LYMPHOCYTES NFR BLD AUTO: 23.9 %
MCH RBC QN AUTO: 29.4 PG (ref 27–31)
MCHC RBC AUTO-ENTMCNC: 34.2 MG/DL (ref 33–36)
MCV RBC AUTO: 85.9 FL (ref 80–94)
MONOCYTES # BLD AUTO: 0.63 X10(3)/MCL (ref 0.1–1.3)
MONOCYTES NFR BLD AUTO: 9.9 %
MUCOUS THREADS URNS QL MICRO: ABNORMAL /LPF
NEUTROPHILS # BLD AUTO: 4 X10(3)/MCL (ref 2.1–9.2)
NEUTROPHILS NFR BLD AUTO: 63.2 %
NITRITE UR QL STRIP.AUTO: NEGATIVE
NRBC BLD AUTO-RTO: 0 %
PH UR STRIP.AUTO: 5.5 [PH]
PLATELET # BLD AUTO: 268 X10(3)/MCL (ref 130–400)
PMV BLD AUTO: 9.1 FL (ref 7.4–10.4)
POCT GLUCOSE: 153 MG/DL (ref 70–110)
POTASSIUM SERPL-SCNC: 4 MMOL/L (ref 3.5–5.1)
PROT SERPL-MCNC: 7.8 GM/DL (ref 5.8–7.6)
PROT UR QL STRIP.AUTO: ABNORMAL MG/DL
RBC # BLD AUTO: 4.32 X10(6)/MCL (ref 4.7–6.1)
RBC #/AREA URNS AUTO: ABNORMAL /HPF
RBC UR QL AUTO: ABNORMAL UNIT/L
SARS-COV-2 RNA RESP QL NAA+PROBE: NOT DETECTED
SODIUM SERPL-SCNC: 137 MMOL/L (ref 136–145)
SP GR UR STRIP.AUTO: 1.02
SQUAMOUS #/AREA URNS LPF: ABNORMAL /HPF
TROPONIN I SERPL-MCNC: 0.01 NG/ML (ref 0–0.04)
UROBILINOGEN UR STRIP-ACNC: NORMAL MG/DL
WBC # SPEC AUTO: 6.4 X10(3)/MCL (ref 4.5–11.5)
WBC #/AREA URNS AUTO: ABNORMAL /HPF

## 2022-08-04 PROCEDURE — 80053 COMPREHEN METABOLIC PANEL: CPT | Performed by: FAMILY MEDICINE

## 2022-08-04 PROCEDURE — 82962 GLUCOSE BLOOD TEST: CPT

## 2022-08-04 PROCEDURE — 25000003 PHARM REV CODE 250: Performed by: FAMILY MEDICINE

## 2022-08-04 PROCEDURE — 87636 SARSCOV2 & INF A&B AMP PRB: CPT | Performed by: FAMILY MEDICINE

## 2022-08-04 PROCEDURE — 82553 CREATINE MB FRACTION: CPT | Performed by: FAMILY MEDICINE

## 2022-08-04 PROCEDURE — 85025 COMPLETE CBC W/AUTO DIFF WBC: CPT | Performed by: FAMILY MEDICINE

## 2022-08-04 PROCEDURE — 93005 ELECTROCARDIOGRAM TRACING: CPT

## 2022-08-04 PROCEDURE — 83880 ASSAY OF NATRIURETIC PEPTIDE: CPT | Performed by: FAMILY MEDICINE

## 2022-08-04 PROCEDURE — 36415 COLL VENOUS BLD VENIPUNCTURE: CPT | Performed by: FAMILY MEDICINE

## 2022-08-04 PROCEDURE — 84484 ASSAY OF TROPONIN QUANT: CPT | Performed by: FAMILY MEDICINE

## 2022-08-04 PROCEDURE — 81001 URINALYSIS AUTO W/SCOPE: CPT | Performed by: FAMILY MEDICINE

## 2022-08-04 PROCEDURE — 82550 ASSAY OF CK (CPK): CPT | Performed by: FAMILY MEDICINE

## 2022-08-04 PROCEDURE — 99285 EMERGENCY DEPT VISIT HI MDM: CPT | Mod: 25,CS

## 2022-08-04 RX ADMIN — SODIUM CHLORIDE 1000 ML: 9 INJECTION, SOLUTION INTRAVENOUS at 02:08

## 2022-08-04 NOTE — ED PROVIDER NOTES
"Encounter Date: 8/4/2022       History     Chief Complaint   Patient presents with    Loss of Consciousness     Pt "passed out" while sitting in vehicle immediately PTA. Pt diaphoretic. Wife reports pt did not respond for approx 2-3 minutes and kept going in and out. Hx of afib and CHF. Did not take meds this AM. Pt pale. Denies CP. Pt SOB with minimal movement. Does report pain in R shoulder.      67 y/o male presents to the ER after having a syncopal episode about 30 minutes prior to arrival.  Patient reports that he was in the [employer] parking lot in the car, and began feeling overheated.  Patient passed off for short period of time.  Patient awoke, and was reporting feeling weakness, and also some discomfort in his right trapezius.  Patient denies fever chills.  Reports mild nausea.  Currently reports feeling significantly improved since being in the cool weather of the emergency room.  Patient's only symptom prior to syncopal episode was feeling overheated.        Review of patient's allergies indicates:   Allergen Reactions    Lisinopril Swelling     Past Medical History:   Diagnosis Date    BPH (benign prostatic hyperplasia)     CKD (chronic kidney disease) stage 3, GFR 30-59 ml/min     Gout, unspecified     Heart failure, unspecified     HTN (hypertension)     Obesity, unspecified     Paroxysmal atrial fibrillation      Past Surgical History:   Procedure Laterality Date    BONE MARROW BIOPSY      BRONCHOSCOPY      CHOLECYSTECTOMY      wisdom teeth removal       Family History   Problem Relation Age of Onset    Diabetes Mother     Heart attack Mother     Kidney disease Father     Heart disease Father      Social History     Tobacco Use    Smoking status: Never Smoker    Smokeless tobacco: Never Used   Substance Use Topics    Alcohol use: Never    Drug use: Never     Review of Systems   Constitutional: Positive for fatigue. Negative for chills and fever.   HENT: Negative for ear pain, " rhinorrhea and sore throat.    Eyes: Negative for photophobia and pain.   Respiratory: Negative for cough, shortness of breath and wheezing.    Cardiovascular: Positive for chest pain.   Gastrointestinal: Negative for abdominal pain, diarrhea, nausea and vomiting.   Genitourinary: Negative for dysuria.   Neurological: Positive for dizziness. Negative for weakness and headaches.   All other systems reviewed and are negative.      Physical Exam     Initial Vitals   BP Pulse Resp Temp SpO2   08/04/22 1245 08/04/22 1248 08/04/22 1248 08/04/22 1248 08/04/22 1253   (!) 168/97 80 20 97.5 °F (36.4 °C) 100 %      MAP       --                Physical Exam    Nursing note and vitals reviewed.  Constitutional: He appears well-developed and well-nourished.   HENT:   Head: Normocephalic and atraumatic.   Eyes: EOM are normal. Pupils are equal, round, and reactive to light.   Neck: Neck supple.   Normal range of motion.  Cardiovascular: Normal rate, regular rhythm and normal heart sounds. Exam reveals no gallop and no friction rub.    No murmur heard.  Pulmonary/Chest: Breath sounds normal. No respiratory distress.   Abdominal: Abdomen is soft. Bowel sounds are normal. He exhibits no distension. There is no abdominal tenderness.   Musculoskeletal:         General: Normal range of motion.      Cervical back: Normal range of motion and neck supple.     Neurological: He is alert and oriented to person, place, and time. He has normal strength.   Skin: Skin is warm and dry.   Psychiatric: He has a normal mood and affect. His behavior is normal. Judgment and thought content normal.         ED Course   Procedures  Labs Reviewed   COMPREHENSIVE METABOLIC PANEL - Abnormal; Notable for the following components:       Result Value    Carbon Dioxide 20 (*)     Glucose Level 159 (*)     Creatinine 1.81 (*)     Protein Total 7.8 (*)     Globulin 3.9 (*)     Albumin/Globulin Ratio 1.0 (*)     All other components within normal limits    URINALYSIS, REFLEX TO URINE CULTURE - Abnormal; Notable for the following components:    Protein, UA Trace (*)     Blood, UA Trace (*)     Bacteria, UA Trace (*)     Squamous Epithelial Cells, UA Trace (*)     Mucous, UA Trace (*)     All other components within normal limits   CK - Abnormal; Notable for the following components:    Creatine Kinase 20 (*)     All other components within normal limits   CBC WITH DIFFERENTIAL - Abnormal; Notable for the following components:    RBC 4.32 (*)     Hgb 12.7 (*)     Hct 37.1 (*)     IG# 0.05 (*)     All other components within normal limits   POCT GLUCOSE - Abnormal; Notable for the following components:    POCT Glucose 153 (*)     All other components within normal limits   CK-MB - Normal   TROPONIN I - Normal   B-TYPE NATRIURETIC PEPTIDE - Normal   COVID/FLU A&B PCR - Normal   CBC W/ AUTO DIFFERENTIAL    Narrative:     The following orders were created for panel order CBC Auto Differential.  Procedure                               Abnormality         Status                     ---------                               -----------         ------                     CBC with Differential[[id number]]        Abnormal            Final result                 Please view results for these tests on the individual orders.   EXTRA TUBES    Narrative:     The following orders were created for panel order EXTRA TUBES.  Procedure                               Abnormality         Status                     ---------                               -----------         ------                     Light Blue Top Hold[[id number]]                              In process                 Gold Top Hold[[id number]]                                    In process                   Please view results for these tests on the individual orders.   LIGHT BLUE TOP HOLD   GOLD TOP HOLD        ECG Results          EKG 12-lead (In process)  Result time 08/04/22 13:31:49    In process by Interface, Lab In  [facility] (08/04/22 13:31:49)                 Narrative:    Test Reason : R06.00,    Vent. Rate : 086 BPM     Atrial Rate : 086 BPM     P-R Int : 238 ms          QRS Dur : 096 ms      QT Int : 382 ms       P-R-T Axes : 054 -18 062 degrees     QTc Int : 457 ms    Program found technically poor ECG  Sinus rhythm with 1st degree A-V block with occasional Premature  ventricular complexes  Nonspecific T wave abnormality  Abnormal ECG  When compared with ECG of 08-JUL-2022 11:56,  Premature ventricular complexes are now Present  Minimal criteria for Septal infarct are no longer Present  Nonspecific T wave abnormality, worse in Anterior leads    Referred By: [email or web address]   SELF           Confirmed By:                             Imaging Results          CT Head Without Contrast (Final result)  Result time 08/04/22 14:49:32    Final result by Lyn Qureshi MD (08/04/22 14:49:32)                 Impression:      No acute abnormality seen      Electronically signed by: Lyn Qureshi  Date:    08/04/2022  Time:    14:49             Narrative:    EXAMINATION:  CT HEAD WITHOUT CONTRAST    CLINICAL HISTORY:  Syncope;    TECHNIQUE:  Multiple axial images were obtained from the base of the brain to the vertex without contrast administration.  Sagittal and coronal reconstructions were performed..Automatic exposure control is utilized to reduce patient radiation exposure.    COMPARISON:  12/03/2016    FINDINGS:  There is no intracranial mass or lesion seen.  No hemorrhage is seen.  No infarct is seen.  The ventricles and basilar cisterns appear normal.  Brain parenchyma appears grossly unremarkable.    Posterior fossa appears normal.  The calvarium is intact.  The paranasal sinuses appear grossly unremarkable.                               X-Ray Chest 1 View (Final result)  Result time 08/04/22 13:40:24    Final result by Fermin Burgess MD (08/04/22 13:40:24)                 Impression:      No active pulmonary  disease      Electronically signed by: Fermin Burgess  Date:    08/04/2022  Time:    13:40             Narrative:    EXAMINATION:  XR CHEST 1 VIEW    CPT [id number]    CLINICAL HISTORY:  Chest Pain;    COMPARISON:  July 12, 2022    FINDINGS:  Examination reveals mediastinal cardiac silhouette to be within normal limits lung fields are clear and free of gross infiltrates atelectasis or effusions.    There is elevation of the right hemidiaphragm                                 Medications   sodium chloride 0.9% bolus 1,000 mL (1,000 mLs Intravenous New Bag 8/4/22 1400)     Medical Decision Making:   Initial Assessment:   08/04/2022 2:39 PM  Rate: 86 bpm  Rhythm: Sinus  Axis: Nomral  Intervals: Normal  ST Changes: Nonspecific  Ectopy: PVC occasional  Impression: Normal sinus rhythm with nonspecific ST changes               ED Course as of 08/04/22 1510   Thu Aug 04, 2022   1426 WBC: 6.4 [MW]   1426 Hemoglobin(!): 12.7 [MW]   1426 Hematocrit(!): 37.1 [MW]   1426 Platelets: 268 [MW]   1427 Sodium: 137 [MW]   1427 Potassium: 4.0 [MW]   1427 Chloride: 103 [MW]   1427 Glucose(!): 159 [MW]   1427 BUN: 18.6 [MW]   1427 Creatinine(!): 1.81 [MW]   1427 Troponin I: 0.010 [MW]   1427 CPK MB: 1.0 [MW]   1427 BNP: 40.1 [MW]   1427 CPK(!): 20 [MW]   1509 Patient feeling significantly improved; discussed with patient and family regarding findings.  Patient's symptoms sound consistent with a syncopal episode, possibly from being overheated.  Patient will followup with PCP this week.  ER precautions for any acute worsening. [MW]      ED Course User Index  [MW] Wesley Montoya MD             Clinical Impression:   Final diagnoses:  [R06.00] Dyspnea  [R55] Syncope, unspecified syncope type (Primary)          ED Disposition Condition    Discharge Stable        ED Prescriptions     None        Follow-up Information     Follow up With Specialties Details Why Contact Info    [facility] Emergency Medicine  If  symptoms worsen [address] [facility] [id number] [phone]    Luis Eduardo Pulliam MD Family Medicine In 1 week  [address]  [address]  [facility] [address]  [phone]             Wesley Montoya MD  08/04/22 7426

## 2022-08-04 NOTE — ED PROVIDER NOTES
"Encounter Date: 8/4/2022       History     Chief Complaint   Patient presents with    Loss of Consciousness     Pt "passed out" while sitting in vehicle immediately PTA. Pt diaphoretic. Wife reports pt did not respond for approx 2-3 minutes and kept going in and out. Hx of afib and CHF. Did not take meds this AM. Pt pale. Denies CP. Pt SOB with minimal movement. Does report pain in R shoulder.      Pt is a 66 year old male with a hx of A-fib, CHF, HTN, DM, CKD stage 3 presents today after passing out while seating in his vehicle at [employer] parking lot. According to his wife, pt loss conciousness for about 5 mns with was shaking a little bit during the episode.Upon re-gainning consciousness, pt was confused and had no recolection of the event. Pt denies chest pain, anal and urinary incontinence, fever, chills, nausea but vomiting once upon arriving to the ED.    The history is provided by the patient and the spouse. No  was used.   Loss of Consciousness  This is a new problem. The current episode started 3 to 5 hours ago. The problem occurs hourly. The problem has been resolved. Associated symptoms include shortness of breath. Pertinent negatives include no chest pain, no abdominal pain and no headaches. Nothing aggravates the symptoms. Nothing relieves the symptoms.     Review of patient's allergies indicates:   Allergen Reactions    Lisinopril Swelling     Past Medical History:   Diagnosis Date    BPH (benign prostatic hyperplasia)     CKD (chronic kidney disease) stage 3, GFR 30-59 ml/min     Gout, unspecified     Heart failure, unspecified     HTN (hypertension)     Obesity, unspecified     Paroxysmal atrial fibrillation      Past Surgical History:   Procedure Laterality Date    BONE MARROW BIOPSY      BRONCHOSCOPY      CHOLECYSTECTOMY      wisdom teeth removal       Family History   Problem Relation Age of Onset    Diabetes Mother     Heart attack Mother     Kidney disease " Father     Heart disease Father      Social History     Tobacco Use    Smoking status: Never Smoker    Smokeless tobacco: Never Used   Substance Use Topics    Alcohol use: Never    Drug use: Never     Review of Systems   Constitutional: Positive for fatigue. Negative for appetite change, chills and fever.   HENT: Negative for facial swelling, trouble swallowing and voice change.    Eyes: Negative for visual disturbance.   Respiratory: Positive for shortness of breath. Negative for apnea, cough, choking, chest tightness and wheezing.    Cardiovascular: Positive for syncope. Negative for chest pain, palpitations and leg swelling.   Gastrointestinal: Positive for vomiting. Negative for abdominal distention, abdominal pain, constipation, diarrhea and nausea.   Endocrine: Negative for polyuria.   Genitourinary: Negative for decreased urine volume, difficulty urinating, enuresis, frequency and urgency.   Musculoskeletal: Positive for neck stiffness. Negative for joint swelling and myalgias.   Skin: Negative for color change and rash.   Neurological: Positive for syncope and weakness. Negative for dizziness, tremors, facial asymmetry, speech difficulty, light-headedness, numbness and headaches.   Psychiatric/Behavioral: Positive for confusion.       Physical Exam     Initial Vitals   BP Pulse Resp Temp SpO2   08/04/22 1245 08/04/22 1248 08/04/22 1248 08/04/22 1248 08/04/22 1253   (!) 168/97 80 20 97.5 °F (36.4 °C) 100 %      MAP       --                Physical Exam    Vitals reviewed.  Constitutional: He appears well-developed and well-nourished. He is not diaphoretic. No distress.   HENT:   Head: Normocephalic and atraumatic.   Eyes: Pupils are equal, round, and reactive to light.   Neck: Neck supple. No JVD present.   Cardiovascular: Normal rate, regular rhythm, normal heart sounds and intact distal pulses. Exam reveals no gallop and no friction rub.    No murmur heard.  Pulmonary/Chest: No respiratory distress. He  has no wheezes. He has no rales. He exhibits no tenderness.   Abdominal: Abdomen is soft. Bowel sounds are normal. He exhibits no distension and no mass. There is no abdominal tenderness. There is no rebound.   Musculoskeletal:      Cervical back: Neck supple.     Neurological: He is alert and oriented to person, place, and time. No cranial nerve deficit or sensory deficit. GCS score is 15. GCS eye subscore is 4. GCS verbal subscore is 5. GCS motor subscore is 6.   Skin: Skin is warm. Capillary refill takes less than 2 seconds. No rash noted. No erythema.         ED Course   Procedures  Labs Reviewed   COMPREHENSIVE METABOLIC PANEL - Abnormal; Notable for the following components:       Result Value    Carbon Dioxide 20 (*)     Glucose Level 159 (*)     Creatinine 1.81 (*)     Protein Total 7.8 (*)     Globulin 3.9 (*)     Albumin/Globulin Ratio 1.0 (*)     All other components within normal limits   CK - Abnormal; Notable for the following components:    Creatine Kinase 20 (*)     All other components within normal limits   CBC WITH DIFFERENTIAL - Abnormal; Notable for the following components:    RBC 4.32 (*)     Hgb 12.7 (*)     Hct 37.1 (*)     IG# 0.05 (*)     All other components within normal limits   POCT GLUCOSE - Abnormal; Notable for the following components:    POCT Glucose 153 (*)     All other components within normal limits   CK-MB - Normal   TROPONIN I - Normal   B-TYPE NATRIURETIC PEPTIDE - Normal   COVID/FLU A&B PCR - Normal   CBC W/ AUTO DIFFERENTIAL    Narrative:     The following orders were created for panel order CBC Auto Differential.  Procedure                               Abnormality         Status                     ---------                               -----------         ------                     CBC with Differential[[id number]]        Abnormal            Final result                 Please view results for these tests on the individual orders.   URINALYSIS, REFLEX TO URINE CULTURE    EXTRA TUBES    Narrative:     The following orders were created for panel order EXTRA TUBES.  Procedure                               Abnormality         Status                     ---------                               -----------         ------                     Light Blue Top Hold[[id number]]                              In process                 Gold Top Hold[[id number]]                                    In process                   Please view results for these tests on the individual orders.   LIGHT BLUE TOP HOLD   GOLD TOP HOLD        ECG Results          EKG 12-lead (In process)  Result time 08/04/22 13:31:49    In process by Interface, Lab In [facility] (08/04/22 13:31:49)                 Narrative:    Test Reason : R06.00,    Vent. Rate : 086 BPM     Atrial Rate : 086 BPM     P-R Int : 238 ms          QRS Dur : 096 ms      QT Int : 382 ms       P-R-T Axes : 054 -18 062 degrees     QTc Int : 457 ms    Program found technically poor ECG  Sinus rhythm with 1st degree A-V block with occasional Premature  ventricular complexes  Nonspecific T wave abnormality  Abnormal ECG  When compared with ECG of 08-JUL-2022 11:56,  Premature ventricular complexes are now Present  Minimal criteria for Septal infarct are no longer Present  Nonspecific T wave abnormality, worse in Anterior leads    Referred By: [email or web address]   SELF           Confirmed By:                             Imaging Results          X-Ray Chest 1 View (Final result)  Result time 08/04/22 13:40:24    Final result by Fermin Burgess MD (08/04/22 13:40:24)                 Impression:      No active pulmonary disease      Electronically signed by: Fermin Burgess  Date:    08/04/2022  Time:    13:40             Narrative:    EXAMINATION:  XR CHEST 1 VIEW    CPT 60564    CLINICAL HISTORY:  Chest Pain;    COMPARISON:  July 12, 2022    FINDINGS:  Examination reveals mediastinal cardiac silhouette to be within normal limits lung fields are clear and free of  gross infiltrates atelectasis or effusions.    There is elevation of the right hemidiaphragm                               CT Head Without Contrast (In process)                  Medications   sodium chloride 0.9% bolus 1,000 mL (has no administration in time range)        Additional MDM:     NIH Stroke Scale:   Interval = baseline (upon arrival/admit)  Level of consciousness = 0 - alert  LOC questions = 0 - answers both correctly  LOC commands = 0 - performs both correctly  Best gaze = 0 - normal  Visual = 0 - no visual loss  Facial palsy = 0 - normal  Motor left arm =  0 - no drift  Motor right arm =  0 - no drift  Motor left leg = 0 - no drift  Motor right leg =  0 - no drift  Limb ataxia = 0 - absent  Sensory = 0 - normal  Best language = 0 - no aphasia  Dysarthria = 0 - normal articulation  Extinction and inattention = 0 - no neglect  NIH Stroke Scale Total = 0          ED Course as of 08/04/22 1553   Thu Aug 04, 2022   1426 WBC: 6.4 [MW]   1426 Hemoglobin(!): 12.7 [MW]   1426 Hematocrit(!): 37.1 [MW]   1426 Platelets: 268 [MW]   1427 Sodium: 137 [MW]   1427 Potassium: 4.0 [MW]   1427 Chloride: 103 [MW]   1427 Glucose(!): 159 [MW]   1427 BUN: 18.6 [MW]   1427 Creatinine(!): 1.81 [MW]   1427 Troponin I: 0.010 [MW]   1427 CPK MB: 1.0 [MW]   1427 BNP: 40.1 [MW]   1427 CPK(!): 20 [MW]   1509 Patient feeling significantly improved; discussed with patient and family regarding findings.  Patient's symptoms sound consistent with a syncopal episode, possibly from being overheated.  Patient will followup with PCP this week.  ER precautions for any acute worsening. [MW]      ED Course User Index  [MW] Wesley Montoya MD             Clinical Impression:   Final diagnoses:  [R06.00] Dyspnea

## 2022-09-22 ENCOUNTER — HOSPITAL ENCOUNTER (INPATIENT)
Facility: HOSPITAL | Age: 67
LOS: 1 days | Discharge: HOME | DRG: 389 | End: 2022-09-24
Attending: STUDENT IN AN ORGANIZED HEALTH CARE EDUCATION/TRAINING PROGRAM | Admitting: INTERNAL MEDICINE
Payer: MEDICARE

## 2022-09-22 DIAGNOSIS — K56.7 ILEUS: ICD-10-CM

## 2022-09-22 DIAGNOSIS — N17.9 AKI (ACUTE KIDNEY INJURY): ICD-10-CM

## 2022-09-22 DIAGNOSIS — R11.2 INTRACTABLE VOMITING WITH NAUSEA, UNSPECIFIED VOMITING TYPE: Primary | ICD-10-CM

## 2022-09-22 DIAGNOSIS — K56.609 SMALL BOWEL OBSTRUCTION: ICD-10-CM

## 2022-09-22 DIAGNOSIS — R11.10 VOMITING: ICD-10-CM

## 2022-09-22 DIAGNOSIS — R07.9 CHEST PAIN: ICD-10-CM

## 2022-09-22 LAB
ALBUMIN SERPL-MCNC: 4.2 GM/DL (ref 3.4–4.8)
ALBUMIN/GLOB SERPL: 0.9 RATIO (ref 1.1–2)
ALP SERPL-CCNC: 85 UNIT/L (ref 40–150)
ALT SERPL-CCNC: 12 UNIT/L (ref 0–55)
AST SERPL-CCNC: 14 UNIT/L (ref 5–34)
BASOPHILS # BLD AUTO: 0.02 X10(3)/MCL (ref 0–0.2)
BASOPHILS NFR BLD AUTO: 0.2 %
BILIRUBIN DIRECT+TOT PNL SERPL-MCNC: 0.9 MG/DL
BUN SERPL-MCNC: 39.4 MG/DL (ref 8.4–25.7)
CALCIUM SERPL-MCNC: 10.3 MG/DL (ref 8.8–10)
CHLORIDE SERPL-SCNC: 97 MMOL/L (ref 98–107)
CO2 SERPL-SCNC: 23 MMOL/L (ref 23–31)
CREAT SERPL-MCNC: 3.19 MG/DL (ref 0.73–1.18)
EOSINOPHIL # BLD AUTO: 0 X10(3)/MCL (ref 0–0.9)
EOSINOPHIL NFR BLD AUTO: 0 %
ERYTHROCYTE [DISTWIDTH] IN BLOOD BY AUTOMATED COUNT: 13.5 % (ref 11.5–17)
GFR SERPLBLD CREATININE-BSD FMLA CKD-EPI: 20 MLS/MIN/1.73/M2
GLOBULIN SER-MCNC: 4.5 GM/DL (ref 2.4–3.5)
GLUCOSE SERPL-MCNC: 195 MG/DL (ref 82–115)
HCT VFR BLD AUTO: 45.6 % (ref 42–52)
HGB BLD-MCNC: 14.4 GM/DL (ref 14–18)
IMM GRANULOCYTES # BLD AUTO: 0.07 X10(3)/MCL (ref 0–0.04)
IMM GRANULOCYTES NFR BLD AUTO: 0.6 %
LIPASE SERPL-CCNC: 15 U/L
LYMPHOCYTES # BLD AUTO: 0.76 X10(3)/MCL (ref 0.6–4.6)
LYMPHOCYTES NFR BLD AUTO: 6.2 %
MCH RBC QN AUTO: 27.5 PG (ref 27–31)
MCHC RBC AUTO-ENTMCNC: 31.6 MG/DL (ref 33–36)
MCV RBC AUTO: 87 FL (ref 80–94)
MONOCYTES # BLD AUTO: 0.45 X10(3)/MCL (ref 0.1–1.3)
MONOCYTES NFR BLD AUTO: 3.6 %
NEUTROPHILS # BLD AUTO: 11 X10(3)/MCL (ref 2.1–9.2)
NEUTROPHILS NFR BLD AUTO: 89.4 %
NRBC BLD AUTO-RTO: 0 %
PLATELET # BLD AUTO: 393 X10(3)/MCL (ref 130–400)
PMV BLD AUTO: 9.3 FL (ref 7.4–10.4)
POTASSIUM SERPL-SCNC: 4.2 MMOL/L (ref 3.5–5.1)
PROT SERPL-MCNC: 8.7 GM/DL (ref 5.8–7.6)
RBC # BLD AUTO: 5.24 X10(6)/MCL (ref 4.7–6.1)
SODIUM SERPL-SCNC: 137 MMOL/L (ref 136–145)
WBC # SPEC AUTO: 12.3 X10(3)/MCL (ref 4.5–11.5)

## 2022-09-22 PROCEDURE — 99285 EMERGENCY DEPT VISIT HI MDM: CPT | Mod: 25

## 2022-09-22 PROCEDURE — 80053 COMPREHEN METABOLIC PANEL: CPT | Performed by: STUDENT IN AN ORGANIZED HEALTH CARE EDUCATION/TRAINING PROGRAM

## 2022-09-22 PROCEDURE — 96361 HYDRATE IV INFUSION ADD-ON: CPT

## 2022-09-22 PROCEDURE — 83690 ASSAY OF LIPASE: CPT | Performed by: STUDENT IN AN ORGANIZED HEALTH CARE EDUCATION/TRAINING PROGRAM

## 2022-09-22 PROCEDURE — 96374 THER/PROPH/DIAG INJ IV PUSH: CPT

## 2022-09-22 PROCEDURE — 81001 URINALYSIS AUTO W/SCOPE: CPT | Performed by: STUDENT IN AN ORGANIZED HEALTH CARE EDUCATION/TRAINING PROGRAM

## 2022-09-22 PROCEDURE — 85025 COMPLETE CBC W/AUTO DIFF WBC: CPT | Performed by: STUDENT IN AN ORGANIZED HEALTH CARE EDUCATION/TRAINING PROGRAM

## 2022-09-22 PROCEDURE — 36415 COLL VENOUS BLD VENIPUNCTURE: CPT | Performed by: STUDENT IN AN ORGANIZED HEALTH CARE EDUCATION/TRAINING PROGRAM

## 2022-09-22 PROCEDURE — 63600175 PHARM REV CODE 636 W HCPCS: Performed by: STUDENT IN AN ORGANIZED HEALTH CARE EDUCATION/TRAINING PROGRAM

## 2022-09-22 PROCEDURE — 25000003 PHARM REV CODE 250: Performed by: STUDENT IN AN ORGANIZED HEALTH CARE EDUCATION/TRAINING PROGRAM

## 2022-09-22 PROCEDURE — 96375 TX/PRO/DX INJ NEW DRUG ADDON: CPT

## 2022-09-22 RX ORDER — ONDANSETRON 2 MG/ML
4 INJECTION INTRAMUSCULAR; INTRAVENOUS
Status: COMPLETED | OUTPATIENT
Start: 2022-09-22 | End: 2022-09-22

## 2022-09-22 RX ORDER — MORPHINE SULFATE 2 MG/ML
4 INJECTION, SOLUTION INTRAMUSCULAR; INTRAVENOUS
Status: COMPLETED | OUTPATIENT
Start: 2022-09-22 | End: 2022-09-22

## 2022-09-22 RX ADMIN — ONDANSETRON 4 MG: 2 INJECTION INTRAMUSCULAR; INTRAVENOUS at 11:09

## 2022-09-22 RX ADMIN — MORPHINE SULFATE 4 MG: 2 INJECTION, SOLUTION INTRAMUSCULAR; INTRAVENOUS at 11:09

## 2022-09-22 RX ADMIN — SODIUM CHLORIDE 1000 ML: 9 INJECTION, SOLUTION INTRAVENOUS at 11:09

## 2022-09-23 PROBLEM — R19.7 DIARRHEA: Status: ACTIVE | Noted: 2022-09-23

## 2022-09-23 PROBLEM — R11.10 INTRACTABLE VOMITING: Status: ACTIVE | Noted: 2022-09-23

## 2022-09-23 PROBLEM — N17.9 AKI (ACUTE KIDNEY INJURY): Status: ACTIVE | Noted: 2022-09-23

## 2022-09-23 LAB
ALBUMIN SERPL-MCNC: 3.3 GM/DL (ref 3.4–4.8)
ALBUMIN/GLOB SERPL: 0.9 RATIO (ref 1.1–2)
ALP SERPL-CCNC: 65 UNIT/L (ref 40–150)
ALT SERPL-CCNC: 11 UNIT/L (ref 0–55)
ANION GAP SERPL CALC-SCNC: 9 MEQ/L
APPEARANCE UR: ABNORMAL
AST SERPL-CCNC: 13 UNIT/L (ref 5–34)
BACTERIA #/AREA URNS AUTO: ABNORMAL /HPF
BASOPHILS # BLD AUTO: 0.01 X10(3)/MCL (ref 0–0.2)
BASOPHILS NFR BLD AUTO: 0.1 %
BILIRUB UR QL STRIP.AUTO: NEGATIVE MG/DL
BILIRUBIN DIRECT+TOT PNL SERPL-MCNC: 0.7 MG/DL
BUN SERPL-MCNC: 40.5 MG/DL (ref 8.4–25.7)
BUN SERPL-MCNC: 42.1 MG/DL (ref 8.4–25.7)
CALCIUM SERPL-MCNC: 9.1 MG/DL (ref 8.8–10)
CALCIUM SERPL-MCNC: 9.4 MG/DL (ref 8.8–10)
CHLORIDE SERPL-SCNC: 101 MMOL/L (ref 98–107)
CHLORIDE SERPL-SCNC: 101 MMOL/L (ref 98–107)
CO2 SERPL-SCNC: 24 MMOL/L (ref 23–31)
CO2 SERPL-SCNC: 25 MMOL/L (ref 23–31)
COLOR UR AUTO: YELLOW
CREAT SERPL-MCNC: 2.48 MG/DL (ref 0.73–1.18)
CREAT SERPL-MCNC: 2.92 MG/DL (ref 0.73–1.18)
CREAT/UREA NIT SERPL: 17
EOSINOPHIL # BLD AUTO: 0 X10(3)/MCL (ref 0–0.9)
EOSINOPHIL NFR BLD AUTO: 0 %
ERYTHROCYTE [DISTWIDTH] IN BLOOD BY AUTOMATED COUNT: 13.4 % (ref 11.5–17)
GFR SERPLBLD CREATININE-BSD FMLA CKD-EPI: 23 MLS/MIN/1.73/M2
GFR SERPLBLD CREATININE-BSD FMLA CKD-EPI: 28 MLS/MIN/1.73/M2
GLOBULIN SER-MCNC: 3.5 GM/DL (ref 2.4–3.5)
GLUCOSE SERPL-MCNC: 125 MG/DL (ref 82–115)
GLUCOSE SERPL-MCNC: 131 MG/DL (ref 82–115)
GLUCOSE UR QL STRIP.AUTO: NORMAL MG/DL
HCT VFR BLD AUTO: 35.8 % (ref 42–52)
HGB BLD-MCNC: 11.3 GM/DL (ref 14–18)
HYALINE CASTS #/AREA URNS LPF: ABNORMAL /LPF
IMM GRANULOCYTES # BLD AUTO: 0.02 X10(3)/MCL (ref 0–0.04)
IMM GRANULOCYTES NFR BLD AUTO: 0.3 %
KETONES UR QL STRIP.AUTO: ABNORMAL MG/DL
LEUKOCYTE ESTERASE UR QL STRIP.AUTO: 75 UNIT/L
LYMPHOCYTES # BLD AUTO: 0.69 X10(3)/MCL (ref 0.6–4.6)
LYMPHOCYTES NFR BLD AUTO: 10.2 %
MAGNESIUM SERPL-MCNC: 1.5 MG/DL (ref 1.6–2.6)
MCH RBC QN AUTO: 27.8 PG (ref 27–31)
MCHC RBC AUTO-ENTMCNC: 31.6 MG/DL (ref 33–36)
MCV RBC AUTO: 88 FL (ref 80–94)
MONOCYTES # BLD AUTO: 0.64 X10(3)/MCL (ref 0.1–1.3)
MONOCYTES NFR BLD AUTO: 9.5 %
NEUTROPHILS # BLD AUTO: 5.4 X10(3)/MCL (ref 2.1–9.2)
NEUTROPHILS NFR BLD AUTO: 79.9 %
NITRITE UR QL STRIP.AUTO: NEGATIVE
NRBC BLD AUTO-RTO: 0 %
PH UR STRIP.AUTO: 5 [PH]
PHOSPHATE SERPL-MCNC: 2.4 MG/DL (ref 2.3–4.7)
PLATELET # BLD AUTO: 244 X10(3)/MCL (ref 130–400)
PMV BLD AUTO: 9.2 FL (ref 7.4–10.4)
POCT GLUCOSE: 107 MG/DL (ref 70–110)
POCT GLUCOSE: 118 MG/DL (ref 70–110)
POCT GLUCOSE: 122 MG/DL (ref 70–110)
POTASSIUM SERPL-SCNC: 4.5 MMOL/L (ref 3.5–5.1)
POTASSIUM SERPL-SCNC: 4.8 MMOL/L (ref 3.5–5.1)
PROT SERPL-MCNC: 6.8 GM/DL (ref 5.8–7.6)
PROT UR QL STRIP.AUTO: ABNORMAL MG/DL
RBC # BLD AUTO: 4.07 X10(6)/MCL (ref 4.7–6.1)
RBC #/AREA URNS AUTO: ABNORMAL /HPF
RBC UR QL AUTO: NEGATIVE UNIT/L
SARS-COV-2 RDRP RESP QL NAA+PROBE: NEGATIVE
SODIUM SERPL-SCNC: 135 MMOL/L (ref 136–145)
SODIUM SERPL-SCNC: 136 MMOL/L (ref 136–145)
SP GR UR STRIP.AUTO: 1.02
SQUAMOUS #/AREA URNS LPF: ABNORMAL /HPF
UROBILINOGEN UR STRIP-ACNC: NORMAL MG/DL
WBC # SPEC AUTO: 6.8 X10(3)/MCL (ref 4.5–11.5)
WBC #/AREA URNS AUTO: ABNORMAL /HPF

## 2022-09-23 PROCEDURE — 87635 SARS-COV-2 COVID-19 AMP PRB: CPT | Performed by: STUDENT IN AN ORGANIZED HEALTH CARE EDUCATION/TRAINING PROGRAM

## 2022-09-23 PROCEDURE — 93005 ELECTROCARDIOGRAM TRACING: CPT

## 2022-09-23 PROCEDURE — 80053 COMPREHEN METABOLIC PANEL: CPT | Performed by: STUDENT IN AN ORGANIZED HEALTH CARE EDUCATION/TRAINING PROGRAM

## 2022-09-23 PROCEDURE — 63600175 PHARM REV CODE 636 W HCPCS: Performed by: STUDENT IN AN ORGANIZED HEALTH CARE EDUCATION/TRAINING PROGRAM

## 2022-09-23 PROCEDURE — 36415 COLL VENOUS BLD VENIPUNCTURE: CPT | Performed by: STUDENT IN AN ORGANIZED HEALTH CARE EDUCATION/TRAINING PROGRAM

## 2022-09-23 PROCEDURE — 25000003 PHARM REV CODE 250: Performed by: STUDENT IN AN ORGANIZED HEALTH CARE EDUCATION/TRAINING PROGRAM

## 2022-09-23 PROCEDURE — 83735 ASSAY OF MAGNESIUM: CPT | Performed by: STUDENT IN AN ORGANIZED HEALTH CARE EDUCATION/TRAINING PROGRAM

## 2022-09-23 PROCEDURE — 85025 COMPLETE CBC W/AUTO DIFF WBC: CPT | Performed by: STUDENT IN AN ORGANIZED HEALTH CARE EDUCATION/TRAINING PROGRAM

## 2022-09-23 PROCEDURE — 84100 ASSAY OF PHOSPHORUS: CPT | Performed by: STUDENT IN AN ORGANIZED HEALTH CARE EDUCATION/TRAINING PROGRAM

## 2022-09-23 PROCEDURE — 11000001 HC ACUTE MED/SURG PRIVATE ROOM

## 2022-09-23 RX ORDER — IBUPROFEN 200 MG
16 TABLET ORAL
Status: DISCONTINUED | OUTPATIENT
Start: 2022-09-23 | End: 2022-09-24 | Stop reason: HOSPADM

## 2022-09-23 RX ORDER — MAGNESIUM SULFATE HEPTAHYDRATE 40 MG/ML
2 INJECTION, SOLUTION INTRAVENOUS ONCE
Status: COMPLETED | OUTPATIENT
Start: 2022-09-23 | End: 2022-09-23

## 2022-09-23 RX ORDER — METOPROLOL SUCCINATE 50 MG/1
50 TABLET, EXTENDED RELEASE ORAL DAILY
Status: DISCONTINUED | OUTPATIENT
Start: 2022-09-23 | End: 2022-09-24 | Stop reason: HOSPADM

## 2022-09-23 RX ORDER — ATORVASTATIN CALCIUM 10 MG/1
10 TABLET, FILM COATED ORAL DAILY
Status: DISCONTINUED | OUTPATIENT
Start: 2022-09-23 | End: 2022-09-24 | Stop reason: HOSPADM

## 2022-09-23 RX ORDER — NALOXONE HCL 0.4 MG/ML
0.02 VIAL (ML) INJECTION
Status: DISCONTINUED | OUTPATIENT
Start: 2022-09-23 | End: 2022-09-24 | Stop reason: HOSPADM

## 2022-09-23 RX ORDER — GLUCAGON 1 MG
1 KIT INJECTION
Status: DISCONTINUED | OUTPATIENT
Start: 2022-09-23 | End: 2022-09-24 | Stop reason: HOSPADM

## 2022-09-23 RX ORDER — ONDANSETRON 4 MG/1
8 TABLET, ORALLY DISINTEGRATING ORAL EVERY 8 HOURS PRN
Status: DISCONTINUED | OUTPATIENT
Start: 2022-09-23 | End: 2022-09-24 | Stop reason: HOSPADM

## 2022-09-23 RX ORDER — IBUPROFEN 200 MG
24 TABLET ORAL
Status: DISCONTINUED | OUTPATIENT
Start: 2022-09-23 | End: 2022-09-24 | Stop reason: HOSPADM

## 2022-09-23 RX ORDER — AMLODIPINE BESYLATE 10 MG/1
10 TABLET ORAL DAILY
Status: DISCONTINUED | OUTPATIENT
Start: 2022-09-23 | End: 2022-09-24 | Stop reason: HOSPADM

## 2022-09-23 RX ORDER — INSULIN ASPART 100 [IU]/ML
0-5 INJECTION, SOLUTION INTRAVENOUS; SUBCUTANEOUS
Status: DISCONTINUED | OUTPATIENT
Start: 2022-09-23 | End: 2022-09-24 | Stop reason: HOSPADM

## 2022-09-23 RX ORDER — TALC
6 POWDER (GRAM) TOPICAL NIGHTLY PRN
Status: DISCONTINUED | OUTPATIENT
Start: 2022-09-23 | End: 2022-09-24 | Stop reason: HOSPADM

## 2022-09-23 RX ORDER — SODIUM CHLORIDE 0.9 % (FLUSH) 0.9 %
10 SYRINGE (ML) INJECTION EVERY 12 HOURS PRN
Status: DISCONTINUED | OUTPATIENT
Start: 2022-09-23 | End: 2022-09-24 | Stop reason: HOSPADM

## 2022-09-23 RX ORDER — SODIUM CHLORIDE, SODIUM LACTATE, POTASSIUM CHLORIDE, CALCIUM CHLORIDE 600; 310; 30; 20 MG/100ML; MG/100ML; MG/100ML; MG/100ML
INJECTION, SOLUTION INTRAVENOUS CONTINUOUS
Status: DISCONTINUED | OUTPATIENT
Start: 2022-09-23 | End: 2022-09-23

## 2022-09-23 RX ORDER — ACETAMINOPHEN 325 MG/1
650 TABLET ORAL EVERY 4 HOURS PRN
Status: DISCONTINUED | OUTPATIENT
Start: 2022-09-23 | End: 2022-09-24 | Stop reason: HOSPADM

## 2022-09-23 RX ORDER — SODIUM CHLORIDE 9 MG/ML
INJECTION, SOLUTION INTRAVENOUS CONTINUOUS
Status: DISCONTINUED | OUTPATIENT
Start: 2022-09-23 | End: 2022-09-24 | Stop reason: HOSPADM

## 2022-09-23 RX ADMIN — METOPROLOL SUCCINATE 50 MG: 50 TABLET, FILM COATED, EXTENDED RELEASE ORAL at 09:09

## 2022-09-23 RX ADMIN — SODIUM CHLORIDE, POTASSIUM CHLORIDE, SODIUM LACTATE AND CALCIUM CHLORIDE: 600; 310; 30; 20 INJECTION, SOLUTION INTRAVENOUS at 02:09

## 2022-09-23 RX ADMIN — SODIUM CHLORIDE: 9 INJECTION, SOLUTION INTRAVENOUS at 05:09

## 2022-09-23 RX ADMIN — MAGNESIUM SULFATE 2 G: 2 INJECTION INTRAVENOUS at 06:09

## 2022-09-23 RX ADMIN — APIXABAN 5 MG: 2.5 TABLET, FILM COATED ORAL at 09:09

## 2022-09-23 RX ADMIN — APIXABAN 5 MG: 2.5 TABLET, FILM COATED ORAL at 08:09

## 2022-09-23 RX ADMIN — SODIUM CHLORIDE, POTASSIUM CHLORIDE, SODIUM LACTATE AND CALCIUM CHLORIDE 1000 ML: 600; 310; 30; 20 INJECTION, SOLUTION INTRAVENOUS at 01:09

## 2022-09-23 RX ADMIN — SODIUM CHLORIDE, POTASSIUM CHLORIDE, SODIUM LACTATE AND CALCIUM CHLORIDE 1000 ML: 600; 310; 30; 20 INJECTION, SOLUTION INTRAVENOUS at 09:09

## 2022-09-23 RX ADMIN — SODIUM CHLORIDE: 9 INJECTION, SOLUTION INTRAVENOUS at 11:09

## 2022-09-23 RX ADMIN — AMLODIPINE BESYLATE 10 MG: 10 TABLET ORAL at 09:09

## 2022-09-23 RX ADMIN — ATORVASTATIN CALCIUM 10 MG: 10 TABLET, FILM COATED ORAL at 09:09

## 2022-09-23 NOTE — H&P
[facility] Internal Medicine History and Physical    Omer Booker  [id number]  09/23/2022 1:35 AM    Mr. Omer Booker is a 66M with PMH A-fib, CHF, HTN, DM, CKD stage 3, demyelinating disease presenting for 2 days of vomiting and diarrhea associated with abdominal pain and dark urine. Emesis is nonbilious, nonbloody. Stool without bleeding or melena, described as watery. Reports eating Gumbo 9/20 with symptoms starting next morning. Unable to keep fluids or solid food down. Recent history of bowel obstruction, still passing flatus. Surgical hx notable for cholecystectomy. Hx of demyelinating disease, frequent vertigo and weakness. Not currently on treatment. Denies fever, chills, chest pain, SOB, swelling in extremities. Reports decreased functional status lately with mild orthopnea lying completely flat and mild left leg swelling. Symptoms complete resolved with hospital bed.   In ED, vital notable for tachycardia. Physical exam unremarkable. Labs notable fore Cr elevation from 1.8 to 3.18, WBC 12.3. Imaging notable for no urinary obstruction w/ ? developing ileus. Given 2 L bolus in ED. [facility] IM consulted for admission for ERICK on CKD.     PMH: A-fib, CHF, HTN, DM, CKD stage 3, demyelinating disease  Psurghx: cholecystectomy 2016  Medications: Metoprolol succinate 50 qD, Atorvastatin 10, Metformin 500 BID, Spironolactone 25 mg qD  Allergies: Lisinopril   CODE: Full     Review of Systems   Constitutional:  Positive for malaise/fatigue. Negative for chills, diaphoresis and fever.   HENT:  Negative for congestion, sinus pain and sore throat.    Eyes:  Positive for blurred vision.        Chronic blurry vision with neurological condition   Respiratory:  Negative for shortness of breath and wheezing.    Cardiovascular:  Positive for orthopnea. Negative for chest pain and leg swelling.   Gastrointestinal:  Positive for abdominal pain, diarrhea, nausea and vomiting. Negative for blood in stool, constipation and melena.  "  Genitourinary:  Negative for dysuria and flank pain.   Musculoskeletal:  Negative for back pain.   Neurological:  Positive for weakness. Negative for seizures and headaches.        Hx of syncopal episodes from heat   Endo/Heme/Allergies:  Negative for environmental allergies.   Psychiatric/Behavioral:  The patient is not nervous/anxious.      Blood pressure 96/76, pulse 93, temperature 98.8 °F (37.1 °C), temperature source Oral, resp. rate 17, height 5' 10" (1.778 m), weight 96 kg (211 lb 10.3 oz), SpO2 100 %.  Physical Exam  Constitutional:       Appearance: Normal appearance.   HENT:      Head: Normocephalic and atraumatic.      Nose: Nose normal.      Mouth/Throat:      Mouth: Mucous membranes are moist.      Pharynx: Oropharynx is clear. No oropharyngeal exudate or posterior oropharyngeal erythema.   Eyes:      Extraocular Movements: Extraocular movements intact.      Conjunctiva/sclera: Conjunctivae normal.      Pupils: Pupils are equal, round, and reactive to light.   Cardiovascular:      Rate and Rhythm: Regular rhythm. Tachycardia present.      Pulses: Normal pulses.      Heart sounds: Normal heart sounds.   Pulmonary:      Effort: Pulmonary effort is normal.      Breath sounds: Normal breath sounds.   Abdominal:      General: Bowel sounds are normal. There is no distension.      Palpations: There is no mass.      Tenderness: There is no abdominal tenderness. There is no guarding or rebound.      Hernia: No hernia is present.      Comments: Abdominal exam after IV pain medications in ED   Musculoskeletal:      Comments: Spontaneous movement of all extremities, weak when sitting up off of 45 degree head of bed   Skin:     General: Skin is warm and dry.      Capillary Refill: Capillary refill takes less than 2 seconds.      Findings: No erythema.   Neurological:      General: No focal deficit present.      Mental Status: He is alert and oriented to person, place, and time.   Psychiatric:         Mood and " Affect: Mood normal.         Behavior: Behavior normal.     Labs  WBC 12.3  H/H 14.4/45.6 w/ MCV 87  Plt 393  Na 137  K4.2  Cl 97  CO2 23  BUN/Cr 39.4/3.18    Imaging  CT Abd Pelvis 9/23  Impression:  1. There are few variably dilated small bowel loops with no definitive transition point probably ileus. Follow as clinically indicated if there is clinical concern for developing small bowel obstruction.    Assessment and Plan    ERICK on CKD  Gastroenteritis w/ Vomiting, Diarrhea  Admit for observation   S/p 2 L in ED, mIVF  ml/hr  Hold nephrotoxic medications   Zofran 8mg ODT PRN   Elect against imodium at this time due to hx of bowel obstruction   Diet as tolerated   Low likelihood of current bowel obstruction due to passing flatus and diarrhea    HTN  Hold HTN medications due to low BP    A-fib   Continue home eliquis and metoprolol as BP allows     HLD  Continue home statin    Fluids: mIVF  ml/hr  Diet: Diabetic, heart healthy  CODE: Full     Dispo: Admit for ERICK on CKD. mIVF and antiemetics.     Jojo Xiong MD  [facility] HO-3

## 2022-09-23 NOTE — PLAN OF CARE
09/23/22 1139   Discharge Assessment   Assessment Type Discharge Planning Assessment   Confirmed/corrected address, phone number and insurance Yes   Confirmed Demographics Correct on Facesheet   Source of Information patient   Reason For Admission ERICK   Lives With spouse   Do you expect to return to your current living situation? Yes   Do you have help at home or someone to help you manage your care at home? Yes   Who are your caregiver(s) and their phone number(s)? wife, Mikala, [phone]   Prior to hospitilization cognitive status: Alert/Oriented;No Deficits   Current cognitive status: No Deficits;Alert/Oriented   Walking or Climbing Stairs Difficulty ambulation difficulty, requires equipment   Mobility Management RW   Dressing/Bathing Difficulty none   Home Layout Able to live on 1st floor   Equipment Currently Used at Home bedside commode;walker, rolling;glucometer  (BP cuff)   Readmission within 30 days? No   Patient currently being followed by outpatient case management? No   Do you currently have service(s) that help you manage your care at home? No   Do you take prescription medications? Yes   Do you have prescription coverage? Yes   Coverage Medicare   Do you have any problems affording any of your prescribed medications? No   Is the patient taking medications as prescribed? yes   Who is going to help you get home at discharge? sife   How do you get to doctors appointments? family or friend will provide;car, drives self   Are you on dialysis? No   Do you take coumadin? No   Discharge Plan A Home with family   DME Needed Upon Discharge  none   Discharge Plan discussed with: Patient   Discharge Barriers Identified None   Relationship/Environment   Name(s) of Who Lives With Patient Mikala

## 2022-09-23 NOTE — PROGRESS NOTES
"Inpatient Nutrition Evaluation    Admit Date: 2022   Total duration of encounter: 1 day    Nutrition Recommendation/Prescription     Diabetic, Heart Healthy diet  Monitor Weights Weekly     Nutrition Assessment     Chart Review    Reason Seen: continuous nutrition monitoring    Diagnosis:  ERICK on CKD, Gastroenteritis, Afib, HLD     Relevant Medical History: Afib, CHF< HTN, DM, CKD    Nutrition-Related Medications: Amlodipine, LR, Atorvastatin, Insulin    Nutrition-Related Labs:  22 -- Glu 131 H, K 4.8, BUN 40 H, Cr 2.9 H, Phos 2.4    Diet Order: Diet heart healthy Diabetic  Oral Supplement Order: none at this time  Appetite/Oral Intake: fair/~50%  Factors Affecting Nutritional Intake: abdominal pain and nausea  Food/Jew/Cultural Preferences: none reported       Wound(s):   skin intact    Comments    Pt reports good appetite prior to onset of n/v/d x 2 days ago, reports no n/v this am & eating ~50% of breakfast this am; no BM this am, mild abdominal discomfort but improved; no significant wt loss compared to EMR wt hx & pt's reported UBW    Anthropometrics    Height: 5' 10" (177.8 cm)    Last Weight: 99.7 kg (219 lb 12.8 oz) (22 1030) Weight Method: Bed Scale  BMI (Calculated): 31.5  BMI Classification: obese grade I (BMI 30-34.9)        Ideal Body Weight (IBW), Male: 166 lb     % Ideal Body Weight, Male (lb): 127.49 %                 Usual Body Weight (UBW), k.09 kg  % Usual Body Weight: 91.58  % Weight Change From Usual Weight: -8.61 %  Usual Weight Provided By: patient    Wt Readings from Last 5 Encounters:   22 99.7 kg (219 lb 12.8 oz)   22 103.1 kg (227 lb 4.7 oz)   22 105 kg (231 lb 7.7 oz)   22 105 kg (231 lb 7.7 oz)   22 105 kg (231 lb 7.7 oz)     Weight Change(s) Since Admission:  Admit Weight: 96 kg (211 lb 10.3 oz) (22 2254)  No indication of significant wt loss    Patient Education    Not applicable.    Monitoring & Evaluation     Dietitian " will monitor food and beverage intake, weight change, electrolyte/renal panel, and gastrointestinal profile.  Nutrition Risk/Follow-Up: low (follow-up in 5-7 days)  Patients assigned 'low nutrition risk' status do not qualify for a full nutritional assessment but will be monitored and re-evaluated in a 5-7 day time period.

## 2022-09-23 NOTE — PROGRESS NOTES
"[facility]  INPATIENT PROGRESS NOTE    Resident Team: [facility] List 3  Attending Physician: Juan Flowers MD  Hospital Length of Stay: 0     SUBJECTIVE:      HPI: Mr. Omer Booker is a 66M with PMH A-fib, CHF, HTN, DM, CKD stage 3, demyelinating disease presenting for 2 days of vomiting and diarrhea associated with abdominal pain and dark urine. Emesis is nonbilious, nonbloody. Stool without bleeding or melena, described as watery. Reports eating Gumbo 9/20 with symptoms starting next morning. Unable to keep fluids or solid food down. Recent history of bowel obstruction, still passing flatus. Surgical hx notable for cholecystectomy. Hx of demyelinating disease, frequent vertigo and weakness. Not currently on treatment. Denies fever, chills, chest pain, SOB, swelling in extremities. Reports decreased functional status lately with mild orthopnea lying completely flat and mild left leg swelling. Symptoms complete resolved with hospital bed. In ED, vital notable for tachycardia. Physical exam unremarkable. Labs notable for Cr elevation from 1.8 to 3.18, WBC 12.3. Imaging notable for no urinary obstruction w/ questionable ileus. Given 2 L bolus in ED. [facility] consulted for admission for ERICK on CKD.     Today: Per nursing staff, overnight was uneventful. Patient states he was able to tolerate ice chips w/o vomiting and that nausea is improving. He continues to have mild abdominal discomfort (pain is 2 out of 10) and has been able to pass gas. He had one episode of diarrhea overnight.     ROS:   (+) Abdominal discomfort, nausea  (-) Chest pain, SOB, palpitations, fever, night sweat, chills, N/V, diarrhea, constipation, dizziness       OBJECTIVE:     Vital signs:   /70   Pulse 92   Temp 98.2 °F (36.8 °C) (Oral)   Resp 20   Ht 5' 10" (1.778 m)   Wt 96 kg (211 lb 10.3 oz)   SpO2 99%   BMI 30.37 kg/m²      Physical Examination:  General: Well nourished w/o distress  HEENT: NC/AT; PERRL; blurry " vision bilaterally; nasal and oral mucosa moist and clear  Neck: Full ROM; no lymphadenopathy  Pulm: CTA bilaterally, normal work of breathing on room air  CV: S1, S2 w/o murmurs or gallops; no edema noted  GI: Mild abdominal distention, soft with normal bowel sounds in all quadrants, no masses on palpation  MSK: Limited ROM in lower extremities accompanied by weakness  Derm: No rashes, abnormal bruising, or skin lesions  Neuro: AAOx4; motor/sensory function intact  Psych: Cooperative; appropriate mood and affect    Laboratory:  Most Recent Data:  CBC:   Lab Results   Component Value Date    WBC 6.8 09/23/2022    HGB 11.3 (L) 09/23/2022    HCT 35.8 (L) 09/23/2022     09/23/2022    MCV 88.0 09/23/2022    RDW 13.4 09/23/2022     WBC Differential:   Recent Labs   Lab 09/22/22  2322 09/23/22  0420   WBC 12.3* 6.8   HGB 14.4 11.3*   HCT 45.6 35.8*    244   MCV 87.0 88.0     BMP:   Lab Results   Component Value Date     09/23/2022    K 4.8 09/23/2022    CO2 24 09/23/2022    BUN 40.5 (H) 09/23/2022    CREATININE 2.92 (H) 09/23/2022    CALCIUM 9.1 09/23/2022    MG 1.50 (L) 09/23/2022    PHOS 1.9 (L) 07/08/2022     LFTs:   Lab Results   Component Value Date    ALBUMIN 3.3 (L) 09/23/2022    BILITOT 0.7 09/23/2022    AST 13 09/23/2022    ALKPHOS 65 09/23/2022    ALT 11 09/23/2022     Coags:   Lab Results   Component Value Date    INR 1.16 07/02/2022    PROTIME 14.5 07/02/2022    PTT 30.7 07/02/2022     FLP:   Lab Results   Component Value Date    CHOL 138 01/11/2022    HDL 38 01/11/2022    TRIG 154 (H) 01/11/2022     DM:   Lab Results   Component Value Date    CREATININE 2.92 (H) 09/23/2022     Thyroid:   Lab Results   Component Value Date    TSH 1.849 07/07/2020     Anemia:   Lab Results   Component Value Date    IRON 52 (L) 01/06/2021    TIBC 277 01/06/2021    FERRITIN 32.71 01/06/2021    MHOFKXNR09 185 (L) 10/22/2018     Cardiac:   Lab Results   Component Value Date    TROPONINI 0.010 08/04/2022    BNP  40.1 2022     Urinalysis:   Lab Results   Component Value Date    COLORU YELLOW 2021    PHUA 5.0 2022    NITRITE Negative 2021    KETONESU Negative 2021    UROBILINOGEN Normal 2022    WBCUA 0-5 2022       Imagin2022 - CT ab/pelvis w/o contrast shows few variably dilated small bowel loops with no definitive transition point probably ileus    Current meds:    apixaban  5 mg Oral BID    atorvastatin  10 mg Oral Daily    magnesium sulfate IVPB  2 g Intravenous Once    metoprolol succinate  50 mg Oral Daily         ASSESSMENT & PLAN:     ERICK on CKD III  Gastroenteritis w/ vomiting & diarrhea  -Cr 3.19 on admission (baseline Cr 1.80); Cr 2.92 on 2022  -Hold nephrotoxic medications   -Zofran 8mg ODT PRN   -Elect against imodium at this time due to hx of bowel obstruction   -Advance diet as tolerated   -Low likelihood of current bowel obstruction due to passing flatus and diarrhea     HTN  -Resume home Amlodipine     A-fib   -Continue home eliquis and metoprolol     HLD  -Continue home statin    DVT PPx: SCD  GI PPx: None  Diet: Advance as tolerated  ABX: None  Fluids: None  Pain PRNs: None  O2: Room air  PCP: Luis Eduardo Pulliam MD    Disposition (2022): Continue observation status for ongoing monitoring and medical management. Mag 1.50 this AM, will replete with Mag sulfate 2g IV once. D/C maintenance IVF, will give 1L LR over 2 hours and additional boluses as needed. Discharge plan: Patient can be discharged home when renal functions improve and him tolerating PO intake.     Millie Silva, DO   [facility], PGY-1

## 2022-09-23 NOTE — ED PROVIDER NOTES
"Encounter Date: 9/22/2022       History     Chief Complaint   Patient presents with    Abdominal Pain     Pt. Reports abd pain "all the time" but states yesterday started having N/V.States today vomiting got worse and he stated it was a dark brown color. States also having diarrhea starting yesterday     66-year-old male presenting today with nausea, vomiting.  He says a couple days ago he started having abdominal pain and nausea vomiting.  He states that now he cannot keep anything down.  He has had a small bowel obstruction the past and he says this is what felt like.  His last bowel movement was this morning and his last episode of flatus was 2 hours prior to arrival.    The history is provided by the patient. No  was used.   Illness   The current episode started several days ago. The problem occurs continuously. The problem has been gradually worsening. Nothing relieves the symptoms. Nothing aggravates the symptoms. Associated symptoms include abdominal pain, diarrhea, nausea and vomiting.   Review of patient's allergies indicates:   Allergen Reactions    Lisinopril Swelling     Past Medical History:   Diagnosis Date    BPH (benign prostatic hyperplasia)     CKD (chronic kidney disease) stage 3, GFR 30-59 ml/min     Gout, unspecified     Heart failure, unspecified     HTN (hypertension)     Obesity, unspecified     Paroxysmal atrial fibrillation      Past Surgical History:   Procedure Laterality Date    BONE MARROW BIOPSY      BRONCHOSCOPY      CHOLECYSTECTOMY      wisdom teeth removal       Family History   Problem Relation Age of Onset    Diabetes Mother     Heart attack Mother     Kidney disease Father     Heart disease Father      Social History     Tobacco Use    Smoking status: Never    Smokeless tobacco: Never   Substance Use Topics    Alcohol use: Never    Drug use: Never     Review of Systems   Gastrointestinal:  Positive for abdominal distention, abdominal pain, diarrhea, nausea and " vomiting.     Physical Exam     Initial Vitals [09/22/22 2254]   BP Pulse Resp Temp SpO2   96/76 93 20 98.8 °F (37.1 °C) 100 %      MAP       --         Physical Exam    Nursing note and vitals reviewed.  Constitutional: He appears well-developed. He is not diaphoretic. No distress.   HENT:   Head: Normocephalic.   Eyes: Right eye exhibits no discharge. Left eye exhibits no discharge.   Neck: Neck supple.   Normal range of motion.  Cardiovascular:  Normal rate and regular rhythm.           Pulmonary/Chest: No respiratory distress. He has no wheezes.   Abdominal: Abdomen is soft. Bowel sounds are normal. He exhibits distension. There is abdominal tenderness. There is no rebound.   Musculoskeletal:         General: No tenderness. Normal range of motion.      Cervical back: Normal range of motion and neck supple.     Neurological: He is alert and oriented to person, place, and time.   Skin: Skin is warm and dry.       ED Course   Procedures  Labs Reviewed   COMPREHENSIVE METABOLIC PANEL - Abnormal; Notable for the following components:       Result Value    Chloride 97 (*)     Glucose Level 195 (*)     Blood Urea Nitrogen 39.4 (*)     Creatinine 3.19 (*)     Calcium Level Total 10.3 (*)     Protein Total 8.7 (*)     Globulin 4.5 (*)     Albumin/Globulin Ratio 0.9 (*)     All other components within normal limits   URINALYSIS, REFLEX TO URINE CULTURE - Abnormal; Notable for the following components:    Appearance, UA Turbid (*)     Protein, UA 1+ (*)     Ketones, UA 1+ (*)     Squamous Epithelial Cells, UA Trace (*)     Hyaline Casts, UA 6-10 (*)     All other components within normal limits   CBC WITH DIFFERENTIAL - Abnormal; Notable for the following components:    WBC 12.3 (*)     MCHC 31.6 (*)     Neut # 11.0 (*)     IG# 0.07 (*)     All other components within normal limits   LIPASE - Normal   CBC W/ AUTO DIFFERENTIAL    Narrative:     The following orders were created for panel order CBC auto  differential.  Procedure                               Abnormality         Status                     ---------                               -----------         ------                     CBC with Differential[[id number]]        Abnormal            Final result                 Please view results for these tests on the individual orders.   EXTRA TUBES    Narrative:     The following orders were created for panel order EXTRA TUBES.  Procedure                               Abnormality         Status                     ---------                               -----------         ------                     Light Blue Top Hold[[id number]]                                                         Gold Top Hold[[id number]]                                                                 Please view results for these tests on the individual orders.   LIGHT BLUE TOP HOLD   GOLD TOP HOLD   SARS-COV-2 RNA AMPLIFICATION, QUAL     EKG Readings: (Independently Interpreted)   Initial Reading: No STEMI. Previous EKG: Compared with most recent EKG Rhythm: Normal Sinus Rhythm. Heart Rate: 92. Ectopy: No Ectopy. Conduction: Normal. ST Segments: Normal ST Segments. T Waves: Normal. Clinical Impression: Normal Sinus Rhythm     Imaging Results              CT Abdomen Pelvis  Without Contrast (Preliminary result)  Result time 09/23/22 00:24:54      Preliminary result by Robert Cooper Jr., MD (09/23/22 00:24:54)                   Narrative:    START OF REPORT:  Technique: CT of the abdomen and pelvis was performed with axial images as well as sagittal and coronal reconstruction images without intravenous contrast.    Comparison: None available.    Clinical History: Nausea/vomiting; Abdominal pain, acute, nonlocalized.    Dosage Information: Automated Exposure Control was utilized.    Findings:  Lines and Tubes: None.  Thorax:  Lungs: There is moderate nonspecific dependent change at the lung bases. No focal infiltrate or consolidation is  seen.  Pleura: No effusions are seen. No pneumothorax is seen. There is mild pleural thickening involving the posterobasal segment of bilateral lower lobes.  Abdomen:  Abdominal Wall: There is diffuse atrophy of the abdominal muscles, gluteal muscles and paraspinal muscles.  Liver: The liver appears unremarkable. 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  Biliary System: No intrahepatic or extrahepatic biliary duct dilatation is seen.  Gallbladder: Surgical clips are seen in the gallbladder fossa which may reflect prior cholecystectomy.  Pancreas: Mild pancreatic atrophy is seen.  Spleen: The spleen appears unremarkable.  Adrenals: The adrenal glands appear unremarkable.  Kidneys: There are few small non-obstructive calculi seen in the both kidneys, largest measuring 3.4 mm in the upper pole of the left kidney. The kidneys appear otherwise unremarkable with no cysts, mass or hydronephrosis.  Bowel:  Esophagus: The visualized esophagus appears unremarkable.  Stomach: The stomach appears unremarkable.  Duodenum: Unremarkable appearing duodenum.  Small Bowel: There are few variably dilated small bowel loops with no definitive transition point probably ileus. Follow as clinically indicated if there is clinical concern for developing small bowel obstruction.  Colon: Nondistended.  Appendix: The appendix appears unremarkable and is partially seen on âImage 63, Series 6â.  Peritoneum: No intraperitoneal free air or ascites is seen.    Pelvis:  Bladder: The bladder is nondistended and cannot be definitively evaluated.  Male:  Prostate gland: The prostate gland appears unremarkable.    Bony structures:  Dorsal Spine: There is pronounced multilevel spondylosis of the visualized dorsal spine.  Bony Pelvis: There is pronounced degenerative change of the hip. There is diffuse osteoarthritis of bilateral hips.      Impression:  1. There are few variably dilated small bowel  loops with no definitive transition point probably ileus. Follow as clinically indicated if there is clinical concern for developing small bowel obstruction.  2. Details and findings as discussed.                          Preliminary result by Interface, Rad Results In (09/23/22 00:24:54)                   Narrative:    START OF REPORT:  Technique: CT of the abdomen and pelvis was performed with axial images as well as sagittal and coronal reconstruction images without intravenous contrast.    Comparison: None available.    Clinical History: Nausea/vomiting; Abdominal pain, acute, nonlocalized.    Dosage Information: Automated Exposure Control was utilized.    Findings:  Lines and Tubes: None.  Thorax:  Lungs: There is moderate nonspecific dependent change at the lung bases. No focal infiltrate or consolidation is seen.  Pleura: No effusions are seen. No pneumothorax is seen. There is mild pleural thickening involving the posterobasal segment of bilateral lower lobes.  Abdomen:  Abdominal Wall: There is diffuse atrophy of the abdominal muscles, gluteal muscles and paraspinal muscles.  Liver: The liver appears unremarkable. 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  Biliary System: No intrahepatic or extrahepatic biliary duct dilatation is seen.  Gallbladder: Surgical clips are seen in the gallbladder fossa which may reflect prior cholecystectomy.  Pancreas: Mild pancreatic atrophy is seen.  Spleen: The spleen appears unremarkable.  Adrenals: The adrenal glands appear unremarkable.  Kidneys: There are few small non-obstructive calculi seen in the both kidneys, largest measuring 3.4 mm in the upper pole of the left kidney. The kidneys appear otherwise unremarkable with no cysts, mass or hydronephrosis.  Bowel:  Esophagus: The visualized esophagus appears unremarkable.  Stomach: The stomach appears unremarkable.  Duodenum: Unremarkable appearing duodenum.  Small Bowel:  There are few variably dilated small bowel loops with no definitive transition point probably ileus. Follow as clinically indicated if there is clinical concern for developing small bowel obstruction.  Colon: Nondistended.  Appendix: The appendix appears unremarkable and is partially seen on âImage 63, Series 6â.  Peritoneum: No intraperitoneal free air or ascites is seen.    Pelvis:  Bladder: The bladder is nondistended and cannot be definitively evaluated.  Male:  Prostate gland: The prostate gland appears unremarkable.    Bony structures:  Dorsal Spine: There is pronounced multilevel spondylosis of the visualized dorsal spine.  Bony Pelvis: There is pronounced degenerative change of the hip. There is diffuse osteoarthritis of bilateral hips.      Impression:  1. There are few variably dilated small bowel loops with no definitive transition point probably ileus. Follow as clinically indicated if there is clinical concern for developing small bowel obstruction.  2. Details and findings as discussed.                                         Medications   lactated ringers bolus 1,000 mL (1,000 mLs Intravenous New Bag 9/23/22 0117)   sodium chloride 0.9% bolus 1,000 mL (0 mLs Intravenous Stopped 9/23/22 0017)   morphine injection 4 mg (4 mg Intravenous Given 9/22/22 2320)   ondansetron injection 4 mg (4 mg Intravenous Given 9/22/22 2320)     Medical Decision Making:   ED Management:  Pt presents to the ED with complaints of vomiting     Differential considerations include: SBO, pancreatitis, appendicitis, cholecystitis, MI  Evaluated patient emergency department.  He was stable well appearing.  His blood work was remarkable for creatinine of 3.2 up from his baseline of 1.9.  His EKG was unremarkable.  His lipase was negative.  His CT scan showed concerning findings for an ileus.  He is still having bowel movements and passing flatus so is unlikely to be a small-bowel obstruction at this time.  Due to the acute  renal failure consulted Hospital Medicine for admission at this time.  He is being admitted the hospital stable condition.                            Clinical Impression:   Final diagnoses:  [R11.10] Vomiting  [R11.2] Intractable vomiting with nausea, unspecified vomiting type (Primary)  [K56.7] Ileus  [N17.9] ERICK (acute kidney injury)      ED Disposition Condition    Admit Stable                Evan Kaur MD  09/23/22 0119

## 2022-09-24 VITALS
RESPIRATION RATE: 18 BRPM | HEART RATE: 73 BPM | HEIGHT: 70 IN | BODY MASS INDEX: 31.47 KG/M2 | WEIGHT: 219.81 LBS | SYSTOLIC BLOOD PRESSURE: 139 MMHG | TEMPERATURE: 98 F | DIASTOLIC BLOOD PRESSURE: 91 MMHG | OXYGEN SATURATION: 98 %

## 2022-09-24 LAB
ALBUMIN SERPL-MCNC: 3 GM/DL (ref 3.4–4.8)
ALBUMIN/GLOB SERPL: 0.9 RATIO (ref 1.1–2)
ALP SERPL-CCNC: 59 UNIT/L (ref 40–150)
ALT SERPL-CCNC: 8 UNIT/L (ref 0–55)
AST SERPL-CCNC: 12 UNIT/L (ref 5–34)
BASOPHILS # BLD AUTO: 0.01 X10(3)/MCL (ref 0–0.2)
BASOPHILS NFR BLD AUTO: 0.2 %
BILIRUBIN DIRECT+TOT PNL SERPL-MCNC: 0.6 MG/DL
BUN SERPL-MCNC: 36.7 MG/DL (ref 8.4–25.7)
CALCIUM SERPL-MCNC: 8.6 MG/DL (ref 8.8–10)
CHLORIDE SERPL-SCNC: 107 MMOL/L (ref 98–107)
CO2 SERPL-SCNC: 22 MMOL/L (ref 23–31)
CREAT SERPL-MCNC: 1.99 MG/DL (ref 0.73–1.18)
EOSINOPHIL # BLD AUTO: 0.1 X10(3)/MCL (ref 0–0.9)
EOSINOPHIL NFR BLD AUTO: 2 %
ERYTHROCYTE [DISTWIDTH] IN BLOOD BY AUTOMATED COUNT: 13.7 % (ref 11.5–17)
GFR SERPLBLD CREATININE-BSD FMLA CKD-EPI: 36 MLS/MIN/1.73/M2
GLOBULIN SER-MCNC: 3.2 GM/DL (ref 2.4–3.5)
GLUCOSE SERPL-MCNC: 109 MG/DL (ref 82–115)
HCT VFR BLD AUTO: 32.2 % (ref 42–52)
HGB BLD-MCNC: 10.5 GM/DL (ref 14–18)
IMM GRANULOCYTES # BLD AUTO: 0.02 X10(3)/MCL (ref 0–0.04)
IMM GRANULOCYTES NFR BLD AUTO: 0.4 %
LYMPHOCYTES # BLD AUTO: 0.77 X10(3)/MCL (ref 0.6–4.6)
LYMPHOCYTES NFR BLD AUTO: 15.5 %
MCH RBC QN AUTO: 28.5 PG (ref 27–31)
MCHC RBC AUTO-ENTMCNC: 32.6 MG/DL (ref 33–36)
MCV RBC AUTO: 87.5 FL (ref 80–94)
MONOCYTES # BLD AUTO: 0.4 X10(3)/MCL (ref 0.1–1.3)
MONOCYTES NFR BLD AUTO: 8.1 %
NEUTROPHILS # BLD AUTO: 3.7 X10(3)/MCL (ref 2.1–9.2)
NEUTROPHILS NFR BLD AUTO: 73.8 %
NRBC BLD AUTO-RTO: 0 %
PLATELET # BLD AUTO: 179 X10(3)/MCL (ref 130–400)
PMV BLD AUTO: 9.4 FL (ref 7.4–10.4)
POCT GLUCOSE: 124 MG/DL (ref 70–110)
POTASSIUM SERPL-SCNC: 4.1 MMOL/L (ref 3.5–5.1)
PROT SERPL-MCNC: 6.2 GM/DL (ref 5.8–7.6)
RBC # BLD AUTO: 3.68 X10(6)/MCL (ref 4.7–6.1)
SODIUM SERPL-SCNC: 137 MMOL/L (ref 136–145)
WBC # SPEC AUTO: 5 X10(3)/MCL (ref 4.5–11.5)

## 2022-09-24 PROCEDURE — 85025 COMPLETE CBC W/AUTO DIFF WBC: CPT | Performed by: STUDENT IN AN ORGANIZED HEALTH CARE EDUCATION/TRAINING PROGRAM

## 2022-09-24 PROCEDURE — 36415 COLL VENOUS BLD VENIPUNCTURE: CPT | Performed by: STUDENT IN AN ORGANIZED HEALTH CARE EDUCATION/TRAINING PROGRAM

## 2022-09-24 PROCEDURE — 80053 COMPREHEN METABOLIC PANEL: CPT | Performed by: STUDENT IN AN ORGANIZED HEALTH CARE EDUCATION/TRAINING PROGRAM

## 2022-09-24 PROCEDURE — 25000003 PHARM REV CODE 250: Performed by: STUDENT IN AN ORGANIZED HEALTH CARE EDUCATION/TRAINING PROGRAM

## 2022-09-24 RX ADMIN — AMLODIPINE BESYLATE 10 MG: 10 TABLET ORAL at 08:09

## 2022-09-24 RX ADMIN — SODIUM CHLORIDE: 9 INJECTION, SOLUTION INTRAVENOUS at 01:09

## 2022-09-24 RX ADMIN — APIXABAN 5 MG: 2.5 TABLET, FILM COATED ORAL at 08:09

## 2022-09-24 RX ADMIN — ATORVASTATIN CALCIUM 10 MG: 10 TABLET, FILM COATED ORAL at 08:09

## 2022-09-24 RX ADMIN — METOPROLOL SUCCINATE 50 MG: 50 TABLET, FILM COATED, EXTENDED RELEASE ORAL at 08:09

## 2022-09-24 NOTE — DISCHARGE SUMMARY
[facility] INTERNAL MEDICINE  INPATIENT DISCHARGE SUMMARY    Admitting Physician: Juan Flowers MD  Attending Physician: Leroy Olivera MD  Date of Admit: 9/22/2022  Date of Discharge: 9/24/2022    Discharge to: Home or Self Care   Condition: Stable    Discharge Diagnoses     Patient Active Problem List   Diagnosis    Small bowel obstruction    Intractable vomiting    ERICK (acute kidney injury)    Diarrhea       Consultants and Procedures     Consultants:  Consults (From admission, onward)          Status Ordering Provider     Inpatient consult to Hospitalist  Once        Provider:  Errol Solano MD    Acknowledged NICHOLAS HODGE           Procedures:   None    Brief History of Present Illness      Mr. Omer Booker is a 66M with PMH A-fib, CHF, HTN, DM, CKD stage 3, demyelinating disease presenting for 2 days of vomiting and diarrhea associated with abdominal pain and dark urine. Emesis is nonbilious, nonbloody. Stool without bleeding or melena, described as watery. Reports eating Gumbo 9/20 with symptoms starting next morning. Unable to keep fluids or solid food down. Recent history of bowel obstruction, still passing flatus. Surgical hx notable for cholecystectomy. Hx of demyelinating disease, frequent vertigo and weakness. Not currently on treatment. Denies fever, chills, chest pain, SOB, swelling in extremities. Reports decreased functional status lately with mild orthopnea lying completely flat and mild left leg swelling. Symptoms complete resolved with hospital bed. In ED, vital notable for tachycardia. Physical exam unremarkable. Labs notable for Cr elevation from 1.8 to 3.18, WBC 12.3. Imaging notable for no urinary obstruction w/ questionable ileus. Given 2 L bolus in ED. [facility] IM consulted for admission for ERICK on CKD.     Hospital Course with Pertinent Findings     Patient was admitted to inpatient IM unit for ongoing monitoring and medical management. His creatinine on admission was 3.19 but  trended down to 1.99 on 9/24/2022 with IV fluid administration. Given his medical history of CKD stage 3, his baseline creatinine is around 1.80-1.90. He states significant symptom improvement as evidenced by being able to tolerated PO intake w/o nausea and vomiting and has been able to have normal bowel movement. He is stable to be discharged home at this time. ED precautions provided.    Discharge physical exam:  Vitals:    09/24/22 0736   BP: (!) 139/91   Pulse: 73   Resp: 18   Temp: 97.9 °F (36.6 °C)     Physical examination:   General: Well nourished w/o distress  HEENT: NC/AT; PERRL; nasal and oral mucosa moist and clear; no sinus tenderness; no thyromegaly  Neck: Full ROM; no lymphadenopathy  Pulm: CTA bilaterally, normal work of breathing  CV: S1, S2 w/o murmurs or gallops; no edema noted  GI: Soft with normal bowel sounds in all quadrants, no masses on palpation  MSK: Limited ROM in lower extremities d/t chronic weakness, full ROM of upper extremities  Derm: No rashes, abnormal bruising, or skin lesions  Neuro: AAOx4; motor/sensory function intact  Psych: Cooperative; appropriate mood and affect    TIME SPENT ON DISCHARGE: 60 minutes    Discharge Medications        Medication List        CONTINUE taking these medications      amLODIPine 10 MG tablet  Commonly known as: NORVASC     atorvastatin 10 MG tablet  Commonly known as: LIPITOR     ELIQUIS 5 mg Tab  Generic drug: apixaban     metFORMIN 500 MG ER 24hr tablet  Commonly known as: GLUCOPHAGE-XR     metoprolol succinate 100 MG 24 hr tablet  Commonly known as: TOPROL-XL     spironolactone 25 MG tablet  Commonly known as: ALDACTONE              Discharge Information:     Patient would like to F/U with his PCP in [place], [place]  ED precautions provided    Millie Silva DO  [facility] PGY-1

## 2022-09-24 NOTE — PLAN OF CARE
Problem: Adult Inpatient Plan of Care  Goal: Plan of Care Review  Outcome: Met  Goal: Patient-Specific Goal (Individualized)  Outcome: Met  Goal: Absence of Hospital-Acquired Illness or Injury  Outcome: Met  Goal: Optimal Comfort and Wellbeing  Outcome: Met  Goal: Readiness for Transition of Care  Outcome: Met     Problem: Skin Injury Risk Increased  Goal: Skin Health and Integrity  Outcome: Met     Problem: Fluid and Electrolyte Imbalance (Acute Kidney Injury/Impairment)  Goal: Fluid and Electrolyte Balance  Outcome: Met     Problem: Oral Intake Inadequate (Acute Kidney Injury/Impairment)  Goal: Optimal Nutrition Intake  Outcome: Met     Problem: Renal Function Impairment (Acute Kidney Injury/Impairment)  Goal: Effective Renal Function  Outcome: Met

## 2022-09-26 ENCOUNTER — PATIENT OUTREACH (OUTPATIENT)
Dept: ADMINISTRATIVE | Facility: CLINIC | Age: 67
End: 2022-09-26
Payer: MEDICARE

## 2022-09-26 NOTE — PROGRESS NOTES
C3 nurse spoke with Omer Booker  for a TCC post hospital discharge follow up call. The patient does not have a scheduled HOSFU appointment with Luis Eduardo Pulliam MD. Stated office will call him to schedule appointment.

## 2022-10-07 ENCOUNTER — TELEPHONE (OUTPATIENT)
Dept: NEPHROLOGY | Facility: CLINIC | Age: 67
End: 2022-10-07
Payer: MEDICARE

## 2022-10-10 ENCOUNTER — LAB VISIT (OUTPATIENT)
Dept: LAB | Facility: HOSPITAL | Age: 67
End: 2022-10-10
Attending: INTERNAL MEDICINE
Payer: MEDICARE

## 2022-10-10 DIAGNOSIS — E11.69 TYPE 2 DIABETES MELLITUS WITH OTHER SPECIFIED COMPLICATION, UNSPECIFIED WHETHER LONG TERM INSULIN USE: ICD-10-CM

## 2022-10-10 DIAGNOSIS — I10 HYPERTENSION, UNSPECIFIED TYPE: ICD-10-CM

## 2022-10-10 DIAGNOSIS — N18.9 CHRONIC KIDNEY DISEASE, UNSPECIFIED CKD STAGE: ICD-10-CM

## 2022-10-10 LAB
ALBUMIN SERPL-MCNC: 3.6 GM/DL (ref 3.4–4.8)
ALBUMIN/GLOB SERPL: 1 RATIO (ref 1.1–2)
ALP SERPL-CCNC: 79 UNIT/L (ref 40–150)
ALT SERPL-CCNC: 9 UNIT/L (ref 0–55)
AST SERPL-CCNC: 13 UNIT/L (ref 5–34)
BASOPHILS # BLD AUTO: 0.02 X10(3)/MCL (ref 0–0.2)
BASOPHILS NFR BLD AUTO: 0.4 %
BILIRUBIN DIRECT+TOT PNL SERPL-MCNC: 0.8 MG/DL
BUN SERPL-MCNC: 13 MG/DL (ref 8.4–25.7)
CALCIUM SERPL-MCNC: 9.3 MG/DL (ref 8.8–10)
CHLORIDE SERPL-SCNC: 101 MMOL/L (ref 98–107)
CO2 SERPL-SCNC: 23 MMOL/L (ref 23–31)
CREAT SERPL-MCNC: 1.56 MG/DL (ref 0.73–1.18)
EOSINOPHIL # BLD AUTO: 0.15 X10(3)/MCL (ref 0–0.9)
EOSINOPHIL NFR BLD AUTO: 2.9 %
ERYTHROCYTE [DISTWIDTH] IN BLOOD BY AUTOMATED COUNT: 13.3 % (ref 11.5–17)
GFR SERPLBLD CREATININE-BSD FMLA CKD-EPI: 48 MLS/MIN/1.73/M2
GLOBULIN SER-MCNC: 3.6 GM/DL (ref 2.4–3.5)
GLUCOSE SERPL-MCNC: 146 MG/DL (ref 82–115)
HCT VFR BLD AUTO: 36.3 % (ref 42–52)
HGB BLD-MCNC: 11.9 GM/DL (ref 14–18)
IMM GRANULOCYTES # BLD AUTO: 0.04 X10(3)/MCL (ref 0–0.04)
IMM GRANULOCYTES NFR BLD AUTO: 0.8 %
LYMPHOCYTES # BLD AUTO: 1.33 X10(3)/MCL (ref 0.6–4.6)
LYMPHOCYTES NFR BLD AUTO: 25.3 %
MCH RBC QN AUTO: 27.9 PG (ref 27–31)
MCHC RBC AUTO-ENTMCNC: 32.8 MG/DL (ref 33–36)
MCV RBC AUTO: 85.2 FL (ref 80–94)
MONOCYTES # BLD AUTO: 0.37 X10(3)/MCL (ref 0.1–1.3)
MONOCYTES NFR BLD AUTO: 7 %
NEUTROPHILS # BLD AUTO: 3.3 X10(3)/MCL (ref 2.1–9.2)
NEUTROPHILS NFR BLD AUTO: 63.6 %
NRBC BLD AUTO-RTO: 0 %
PLATELET # BLD AUTO: 338 X10(3)/MCL (ref 130–400)
PMV BLD AUTO: 9.1 FL (ref 7.4–10.4)
POTASSIUM SERPL-SCNC: 4.4 MMOL/L (ref 3.5–5.1)
PROT SERPL-MCNC: 7.2 GM/DL (ref 5.8–7.6)
PTH-INTACT SERPL-MCNC: 168.1 PG/ML (ref 8.7–77)
RBC # BLD AUTO: 4.26 X10(6)/MCL (ref 4.7–6.1)
SODIUM SERPL-SCNC: 134 MMOL/L (ref 136–145)
WBC # SPEC AUTO: 5.3 X10(3)/MCL (ref 4.5–11.5)

## 2022-10-10 PROCEDURE — 36415 COLL VENOUS BLD VENIPUNCTURE: CPT

## 2022-10-10 PROCEDURE — 80053 COMPREHEN METABOLIC PANEL: CPT

## 2022-10-10 PROCEDURE — 85025 COMPLETE CBC W/AUTO DIFF WBC: CPT

## 2022-10-10 PROCEDURE — 83970 ASSAY OF PARATHORMONE: CPT

## 2022-10-14 ENCOUNTER — OFFICE VISIT (OUTPATIENT)
Dept: NEPHROLOGY | Facility: CLINIC | Age: 67
End: 2022-10-14
Payer: MEDICARE

## 2022-10-14 DIAGNOSIS — N18.31 CKD STAGE G3A/A2, GFR 45-59 AND ALBUMIN CREATININE RATIO 30-299 MG/G: Primary | ICD-10-CM

## 2022-10-14 DIAGNOSIS — N25.81 SECONDARY HYPERPARATHYROIDISM OF RENAL ORIGIN: ICD-10-CM

## 2022-10-14 DIAGNOSIS — I10 PRIMARY HYPERTENSION: ICD-10-CM

## 2022-10-14 PROBLEM — D64.9 NORMOCYTIC ANEMIA: Status: ACTIVE | Noted: 2022-10-14

## 2022-10-14 PROBLEM — R13.10 DYSPHAGIA: Status: ACTIVE | Noted: 2022-10-14

## 2022-10-14 PROBLEM — N40.1 BENIGN PROSTATIC HYPERPLASIA WITH URINARY OBSTRUCTION: Status: ACTIVE | Noted: 2022-10-14

## 2022-10-14 PROBLEM — N13.8 BENIGN PROSTATIC HYPERPLASIA WITH URINARY OBSTRUCTION: Status: ACTIVE | Noted: 2022-10-14

## 2022-10-14 PROBLEM — I50.20 SYSTOLIC HEART FAILURE: Status: ACTIVE | Noted: 2022-10-14

## 2022-10-14 PROBLEM — G62.89 PERIPHERAL DEMYELINATING NEUROPATHY: Status: ACTIVE | Noted: 2022-10-14

## 2022-10-14 PROBLEM — E11.9 DIABETES MELLITUS: Status: ACTIVE | Noted: 2022-10-14

## 2022-10-14 PROBLEM — R59.9 ADENOPATHY: Status: ACTIVE | Noted: 2022-10-14

## 2022-10-14 PROBLEM — N18.30 STAGE 3 CHRONIC KIDNEY DISEASE: Status: ACTIVE | Noted: 2022-10-14

## 2022-10-14 PROBLEM — I48.91 ATRIAL FIBRILLATION: Status: ACTIVE | Noted: 2022-10-14

## 2022-10-14 PROBLEM — E66.9 OBESITY: Status: ACTIVE | Noted: 2022-10-14

## 2022-10-14 PROBLEM — E55.9 VITAMIN D DEFICIENCY: Status: ACTIVE | Noted: 2022-10-14

## 2022-10-14 PROBLEM — M10.9 GOUT: Status: ACTIVE | Noted: 2022-10-14

## 2022-10-14 PROBLEM — N20.1 CALCULUS OF URETER: Status: ACTIVE | Noted: 2022-10-14

## 2022-10-14 PROBLEM — G51.0 BELL'S PALSY: Status: ACTIVE | Noted: 2022-10-14

## 2022-10-14 PROCEDURE — 99214 PR OFFICE/OUTPT VISIT, EST, LEVL IV, 30-39 MIN: ICD-10-PCS | Mod: 95,,, | Performed by: NURSE PRACTITIONER

## 2022-10-14 PROCEDURE — 99214 OFFICE O/P EST MOD 30 MIN: CPT | Mod: 95,,, | Performed by: NURSE PRACTITIONER

## 2022-10-14 NOTE — PROGRESS NOTES
[facility]  Nephrology Clinic Note    Chief complaint: Chronic Kidney Disease (RTC, diarrhea,, 139/77 before meds)    History of present illness:   Omer Booker is a 66 y.o. White male with past medical history of chronic kidney disease, heart failure, hypertension, diabetes mellitus type 2, atrial fibrillation, and demyelinating disease, and BPH.  Presents for follow-up appointment in Nephrology Clinic today. C/o diarrhea, denies fever/abdominal pain.     This is a telemedicine note.     I have reviewed the patient's name and date of birth.  I have verbally reviewed the past medical history, active medication list, and allergies with the patient.  I have verified that this patient is in the state of [place].    Patient was treated using telemedicine, real-time audio and video, according to [facility] protocols. I, distant provider, conducted the visit from [facility] in [place], [place]. The patient participated in the visit at a non-[facility] location. Patient was located at: Home. I am licensed in the state where the patient stated they are located. The patient stated that they understood and accepted the privacy and security risks to their information in their location.    Verbal consent to participate in interactive audio & video visit was obtained. This particular visit occurred during 2020 COVID19 outbreak. I discussed with the patient regarding the nature of our telehealth visits, that:     - Our sessions are not being recorded and that personal health information is protected   - I would evaluate the patient and recommend diagnostics and treatments based on my assessment  - The team will provide follow up care in person if/when the patient needs it.    Total time spent with patient was 25 minutes, over 50% of which was used for education and counseling regarding medical conditions, current medications including risk/benefit and side effects/adverse  events, over-the-counter medication uses/doses, home self-care and contact precautions, and red flags and indications for immediate medical attention. The patient is receptive, expresses understanding and is agreeable to plan. All questions answered and concerns addressed.    Review of Systems  12 point review of systems conducted, negative except as stated in the history of present illness.    Allergies: Patient is allergic to lisinopril.     Past Medical History:  has a past medical history of BPH (benign prostatic hyperplasia), CKD (chronic kidney disease) stage 3, GFR 30-59 ml/min, Gout, unspecified, Heart failure, unspecified, HTN (hypertension), Obesity, unspecified, and Paroxysmal atrial fibrillation.    Procedure History:  has a past surgical history that includes Cholecystectomy; Bronchoscopy; Bone marrow biopsy; and wisdom teeth removal.    Family History: family history includes Diabetes in his mother; Heart attack in his mother; Heart disease in his father; Kidney disease in his father.    Social History:  reports that he has never smoked. He has never used smokeless tobacco. He reports that he does not drink alcohol and does not use drugs.    Physical exam  There were no vitals taken for this visit.  General appearance: Patient is in no acute distress.     Home Medications:    Current Outpatient Medications:     amLODIPine (NORVASC) 10 MG tablet, Take 10 mg by mouth once daily., Disp: , Rfl:     atorvastatin (LIPITOR) 10 MG tablet, Take 10 mg by mouth once daily., Disp: , Rfl:     ELIQUIS 5 mg Tab, Take 5 mg by mouth 2 (two) times daily., Disp: , Rfl:     metFORMIN (GLUCOPHAGE-XR) 500 MG ER 24hr tablet, Take 500 mg by mouth 2 (two) times daily., Disp: , Rfl:     metoprolol succinate (TOPROL-XL) 100 MG 24 hr tablet, Take 50 mg by mouth once daily., Disp: , Rfl:     spironolactone (ALDACTONE) 25 MG tablet, Take 12.5 mg by mouth once daily., Disp: , Rfl:     Laboratory data  Electrolytes   Lab Results    Component Value Date     (L) 10/10/2022    K 4.4 10/10/2022    CHLORIDE 101 10/10/2022    CALCIUM 9.3 10/10/2022    PHOS 2.4 09/23/2022    MG 1.50 (L) 09/23/2022    CO2 23 10/10/2022      Renal function    Lab Results   Component Value Date    BUN 13.0 10/10/2022    CREATININE 1.56 (H) 10/10/2022    EGFRNORACEVR 48 10/10/2022      LFTs  Lab Results   Component Value Date    ALKPHOS 79 10/10/2022    AST 13 10/10/2022    ALT 9 10/10/2022    BILIDIR 0.3 04/05/2022    IBILI 0.50 04/05/2022    BILITOT 0.8 10/10/2022    ALBUMIN 3.6 10/10/2022      DM  Lab Results   Component Value Date    GLUCOSE 146 (H) 10/10/2022      MBD  Lab Results   Component Value Date    .1 (H) 10/10/2022    EZPPCZHC94HH 30.7 07/01/2022      Anemia  Lab Results   Component Value Date    WBC 5.3 10/10/2022    HGB 11.9 (L) 10/10/2022    HCT 36.3 (L) 10/10/2022     10/10/2022    IRON 52 (L) 01/06/2021    TIBC 277 01/06/2021    TRANS 238 01/06/2021    FERRITIN 32.71 01/06/2021    [id number] 185 (L) 10/22/2018       Other  Lab Results   Component Value Date    HIV Nonreactive 08/10/2018    HEPCAB Nonreactive 08/10/2018    HEPBSURFAG Negative 08/10/2018      Urine studies:  Lab Results   Component Value Date    COLORUA Yellow 09/22/2022    APPEARANCEUA Turbid (A) 09/22/2022    SGUA 1.023 09/22/2022    PHUA 5.0 09/22/2022    PROTEINUA 1+ (A) 09/22/2022    GLUCOSEUA Normal 09/22/2022    KETONESUA 1+ (A) 09/22/2022    BLOODUA Negative 09/22/2022    NITRITESUA Negative 09/22/2022    LEUKOCYTESUR 75 09/22/2022    RBCUA 0-5 09/22/2022    WBCUA 0-5 09/22/2022    BACTERIA None Seen 09/22/2022    SQEPUA Trace (A) 09/22/2022    HYALINECASTS 6-10 (A) 09/22/2022    CREATRANDUR 245.5 (H) 07/01/2022    PROTEINURINE 25.6 07/01/2022     Imaging  CT of the abdomen and pelvis without contrast 09/23/2022:  Adrenals:The adrenal glands appear unremarkable.  Kidneys:There are few small non-obstructive calculi seen in the both kidneys, largest measuring  3.4 mm in the upper pole of the left kidney. The kidneys appear otherwise unremarkable with no hydronephrosis.  Prostate gland:The prostate gland appears unremarkable.     Impression and plan     CKD stage G3a/A2, GFR 45-59 and albumin creatinine ratio  mg/g  -     Comprehensive Metabolic Panel; Future; Expected date: 12/23/2022  -     Protein/Creatinine Ratio, Urine; Future; Expected date: 12/23/2022  -     Urinalysis, Reflex to Urine Culture Urine, Clean Catch; Future; Expected date: 12/23/2022  Diabetic kidney disease. Renal indices have improved since last month, there is no significant proteinuria, imaging of the abdomen is without evidence of obstructive uropathy.   Continue risk factor management and MRA (patient is allergic to ACEi). Consider SGLT2i if glycemic control is not at goal.   Continue:  -follow 2 g a day dietary sodium restriction  -control diabetes (goal A1c less than 7%)  -control high blood pressure (goal blood pressure is less than 130/80, please check blood pressure twice a week and bring blood pressure logs to office visit)  -exercise at least 30 minutes a day, 5 days a week  -maintain healthy weight  -decrease or stop alcohol use  -do not smoke  -stay well hydrated (drink water only, avoid juices, sweet tea, and sodas)  -ask about staying up-to-date on vaccinations (flu vaccine, pneumonia vaccine, hepatitis B vaccine)  -avoid excessive use of NSAIDs (ibuprofen, naproxen, Aleve, Advil, Toradol, Mobic), take Tylenol as needed for headache or mild pain  -take cholesterol lowering medications if prescribed (LDL goal less than 100)    Follow-up with the primary care provider as scheduled.  Return to subspecialty nephrology (kidney) clinic with routine labs in 3 months    Primary hypertension  Continue amlodipine and spironolactone as ordered.     Secondary hyperparathyroidism of renal origin  No indications for active vitamin D analogue at present.     BMI 31.0-31.9,adult  Continue well  balanced diet.      10/14/2022  Gala Ordaz NP  [facility]

## 2022-12-26 PROBLEM — N17.9 AKI (ACUTE KIDNEY INJURY): Status: RESOLVED | Noted: 2022-09-23 | Resolved: 2022-12-26

## 2023-01-10 ENCOUNTER — LAB VISIT (OUTPATIENT)
Dept: LAB | Facility: HOSPITAL | Age: 68
End: 2023-01-10
Attending: NURSE PRACTITIONER
Payer: MEDICARE

## 2023-01-10 DIAGNOSIS — N18.31 CKD STAGE G3A/A2, GFR 45-59 AND ALBUMIN CREATININE RATIO 30-299 MG/G: ICD-10-CM

## 2023-01-10 LAB
ALBUMIN SERPL-MCNC: 3.8 G/DL (ref 3.4–4.8)
ALBUMIN/GLOB SERPL: 1 RATIO (ref 1.1–2)
ALP SERPL-CCNC: 72 UNIT/L (ref 40–150)
ALT SERPL-CCNC: 6 UNIT/L (ref 0–55)
APPEARANCE UR: CLEAR
AST SERPL-CCNC: 16 UNIT/L (ref 5–34)
BACTERIA #/AREA URNS AUTO: ABNORMAL /HPF
BILIRUB UR QL STRIP.AUTO: NEGATIVE MG/DL
BILIRUBIN DIRECT+TOT PNL SERPL-MCNC: 0.7 MG/DL
BUN SERPL-MCNC: 20.5 MG/DL (ref 8.4–25.7)
CALCIUM SERPL-MCNC: 9.5 MG/DL (ref 8.8–10)
CHLORIDE SERPL-SCNC: 101 MMOL/L (ref 98–107)
CO2 SERPL-SCNC: 22 MMOL/L (ref 23–31)
COLOR UR AUTO: ABNORMAL
CREAT SERPL-MCNC: 1.82 MG/DL (ref 0.73–1.18)
CREAT UR-MCNC: 162.7 MG/DL (ref 63–166)
GFR SERPLBLD CREATININE-BSD FMLA CKD-EPI: 40 MLS/MIN/1.73/M2
GLOBULIN SER-MCNC: 3.9 GM/DL (ref 2.4–3.5)
GLUCOSE SERPL-MCNC: 134 MG/DL (ref 82–115)
GLUCOSE UR QL STRIP.AUTO: NORMAL MG/DL
HYALINE CASTS #/AREA URNS LPF: ABNORMAL /LPF
KETONES UR QL STRIP.AUTO: NEGATIVE MG/DL
LEUKOCYTE ESTERASE UR QL STRIP.AUTO: NEGATIVE UNIT/L
MUCOUS THREADS URNS QL MICRO: ABNORMAL /LPF
NITRITE UR QL STRIP.AUTO: NEGATIVE
PH UR STRIP.AUTO: 5.5 [PH]
POTASSIUM SERPL-SCNC: 3.7 MMOL/L (ref 3.5–5.1)
PROT SERPL-MCNC: 7.7 GM/DL (ref 5.8–7.6)
PROT UR QL STRIP.AUTO: ABNORMAL MG/DL
PROT UR STRIP-MCNC: 13.3 MG/DL
RBC #/AREA URNS AUTO: ABNORMAL /HPF
RBC UR QL AUTO: NEGATIVE UNIT/L
SODIUM SERPL-SCNC: 134 MMOL/L (ref 136–145)
SP GR UR STRIP.AUTO: 1.01
SQUAMOUS #/AREA URNS LPF: ABNORMAL /HPF
URINE PROTEIN/CREATININE RATIO (OHS): 0.1
UROBILINOGEN UR STRIP-ACNC: NORMAL MG/DL
WBC #/AREA URNS AUTO: ABNORMAL /HPF

## 2023-01-10 PROCEDURE — 80053 COMPREHEN METABOLIC PANEL: CPT

## 2023-01-10 PROCEDURE — 36415 COLL VENOUS BLD VENIPUNCTURE: CPT

## 2023-01-10 PROCEDURE — 81001 URINALYSIS AUTO W/SCOPE: CPT

## 2023-01-10 PROCEDURE — 84156 ASSAY OF PROTEIN URINE: CPT

## 2023-01-12 ENCOUNTER — OFFICE VISIT (OUTPATIENT)
Dept: NEPHROLOGY | Facility: CLINIC | Age: 68
End: 2023-01-12
Payer: MEDICARE

## 2023-01-12 DIAGNOSIS — I10 PRIMARY HYPERTENSION: ICD-10-CM

## 2023-01-12 DIAGNOSIS — N18.32 CKD STAGE G3B/A2, GFR 30-44 AND ALBUMIN CREATININE RATIO 30-299 MG/G: Primary | ICD-10-CM

## 2023-01-12 DIAGNOSIS — E55.9 HYPOVITAMINOSIS D: ICD-10-CM

## 2023-01-12 PROCEDURE — 99213 OFFICE O/P EST LOW 20 MIN: CPT | Mod: 95,,, | Performed by: NURSE PRACTITIONER

## 2023-01-12 PROCEDURE — 99213 PR OFFICE/OUTPT VISIT, EST, LEVL III, 20-29 MIN: ICD-10-PCS | Mod: 95,,, | Performed by: NURSE PRACTITIONER

## 2023-01-12 NOTE — PROGRESS NOTES
Established Patient - Audio Only Telehealth Visit     The patient location is: Home  The chief complaint leading to consultation is:  Management of chronic kidney disease  Visit type: Virtual visit with audio only (telephone)  Total time spent with patient: 25 minutes     The reason for the audio only service rather than synchronous audio and video virtual visit was related to technical difficulties or patient preference/necessity.     Each patient to whom I provide medical services by telemedicine is:  (1) informed of the relationship between the physician and patient and the respective role of any other health care provider with respect to management of the patient; and (2) notified that they may decline to receive medical services by telemedicine and may withdraw from such care at any time. Patient verbally consented to receive this service via voice-only telephone call.    This service was not originating from a related E/M service provided within the previous 7 days nor will  to an E/M service or procedure within the next 24 hours or my soonest available appointment.  Prevailing standard of care was able to be met in this audio-only visit.      [facility]  Nephrology Clinic Note    Chief complaint: Chronic Kidney Disease (Follow up)    History of present illness:   Omer Booker is a 67 y.o. White male with past medical history of chronic kidney disease, heart failure, hypertension, diabetes mellitus type 2, atrial fibrillation, and demyelinating disease, and BPH.  Presents for follow-up appointment in Nephrology Clinic today.    Review of Systems  12 point review of systems conducted, negative except as stated in the history of present illness.    Allergies: Patient is allergic to lisinopril.     Past Medical History:  has a past medical history of BPH (benign prostatic hyperplasia), CKD (chronic kidney disease) stage 3, GFR 30-59 ml/min, Gout, unspecified, Heart failure,  unspecified, HTN (hypertension), Obesity, unspecified, and Paroxysmal atrial fibrillation.    Procedure History:  has a past surgical history that includes Cholecystectomy; Bronchoscopy; Bone marrow biopsy; and wisdom teeth removal.    Family History: family history includes Diabetes in his mother; Heart attack in his mother; Heart disease in his father; Kidney disease in his father.    Social History:  reports that he has never smoked. He has never used smokeless tobacco. He reports that he does not drink alcohol and does not use drugs.    Home Medications:    Current Outpatient Medications:     atorvastatin (LIPITOR) 10 MG tablet, Take 10 mg by mouth once daily., Disp: , Rfl:     ELIQUIS 5 mg Tab, Take 5 mg by mouth 2 (two) times daily., Disp: , Rfl:     metoprolol succinate (TOPROL-XL) 100 MG 24 hr tablet, Take 50 mg by mouth once daily., Disp: , Rfl:     spironolactone (ALDACTONE) 25 MG tablet, Take 12.5 mg by mouth once daily., Disp: , Rfl:     Laboratory data    Hematology  Lab Results   Component Value Date    WBC 5.3 10/10/2022    HGB 11.9 (L) 10/10/2022    HCT 36.3 (L) 10/10/2022     10/10/2022     Chemistry  Lab Results   Component Value Date     (L) 01/10/2023    K 3.7 01/10/2023    CHLORIDE 101 01/10/2023    BUN 20.5 01/10/2023    CREATININE 1.82 (H) 01/10/2023    EGFRNORACEVR 40 01/10/2023    GLUCOSE 134 (H) 01/10/2023    CALCIUM 9.5 01/10/2023    ALKPHOS 72 01/10/2023    LABPROT 7.7 (H) 01/10/2023    ALBUMIN 3.8 01/10/2023    BILIDIR 0.3 04/05/2022    IBILI 0.50 04/05/2022    AST 16 01/10/2023    ALT 6 01/10/2023    MG 1.50 (L) 09/23/2022    PHOS 2.4 09/23/2022      Urine:  Lab Results   Component Value Date    COLORUA Light-Yellow 01/10/2023    APPEARANCEUA Clear 01/10/2023    SGUA 1.015 01/10/2023    PHUA 5.5 01/10/2023    PROTEINUA Trace (A) 01/10/2023    GLUCOSEUA Normal 01/10/2023    KETONESUA Negative 01/10/2023    BLOODUA Negative 01/10/2023    NITRITESUA Negative 01/10/2023     LEUKOCYTESUR Negative 01/10/2023    RBCUA 0-5 01/10/2023    WBCUA 0-5 01/10/2023    BACTERIA None Seen 01/10/2023    SQEPUA None Seen 01/10/2023    HYALINECASTS None Seen 01/10/2023    CREATRANDUR 162.7 01/10/2023    PROTEINURINE 13.3 01/10/2023    UPROTCREA 0.1 01/10/2023         Lab Results   Component Value Date    GLUCOSE 134 (H) 01/10/2023     Lab Results   Component Value Date    .1 (H) 10/10/2022    JBVPMDEC10PM 30.7 07/01/2022       Lab Results   Component Value Date    IRON 52 (L) 01/06/2021    TIBC 277 01/06/2021    TRANS 238 01/06/2021    LABIRON 19 (L) 01/06/2021    FERRITIN 32.71 01/06/2021    [id number] 185 (L) 10/22/2018       Lab Results   Component Value Date    HIV Nonreactive 08/10/2018    HEPCAB Nonreactive 08/10/2018    HEPBSURFAG Negative 08/10/2018         Imaging  CT Abdomen Pelvis  Without Contrast 09/23/2022  Lungs:There is moderate nonspecific dependent change at the lung bases. No focal infiltrate or consolidation is seen.  Pleura:No effusions are seen. No pneumothorax is seen. There is mild pleural thickening involving the posterobasal segment of bilateral lower lobes.  Abdominal Wall:There is diffuse atrophy of the abdominal muscles, gluteal muscles and paraspinal muscles.  Within limitations of noncontrast technique, no acute findings of the liver, pancreas and the spleen identified.  There is pancreatic atrophy.  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  Biliary System:No intrahepatic or extrahepatic biliary duct dilatation is seen.  Gallbladder:Surgical clips are seen in the gallbladder fossa which may reflect prior cholecystectomy.  Adrenals:The adrenal glands appear unremarkable.  Kidneys:There are few small non-obstructive calculi seen in the both kidneys, largest measuring 3.4 mm in the upper pole of the left kidney. The kidneys appear otherwise unremarkable with no hydronephrosis.  Esophagus:The visualized esophagus  appears unremarkable.  Stomach:The stomach appears unremarkable.  Duodenum:Unremarkable appearing duodenum.  Small Bowel:There are few variably dilated small bowel loops with no definitive transition point probably ileus. Follow as clinically indicated if there is clinical concern for developing small bowel obstruction.  Colon:Nondistended.  Appendix:The appendix appears unremarkable and is partially seen on image 63, Series 6.  Peritoneum:No intraperitoneal free air or ascites is seen.  Bladder:The bladder is nondistended and cannot be definitively evaluated.  Prostate gland:The prostate gland appears unremarkable.  Dorsal Spine:There is pronounced multilevel spondylosis of the visualized dorsal spine.  Bony Pelvis:There is pronounced degenerative change of the hip. There is diffuse osteoarthritis of bilateral hips.    Impression  1. There are few variably dilated small bowel loops with no definitive transition point probably ileus. Follow as clinically indicated if there is clinical concern for developing small bowel obstruction.  2. Details and findings as discussed.         Impression and plan     CKD stage G3b/A2, GFR 45-59 and albumin creatinine ratio  mg/g  Diabetic kidney disease. Renal indices are stable, there is no significant proteinuria, imaging of the abdomen is without evidence of obstructive uropathy.   Continue risk factor management and MRA (patient is allergic to ACEi).   Continue:  -2 g a day dietary sodium restriction  -control diabetes (goal A1c less than 7%)  -control high blood pressure (goal blood pressure is less than 130/80, please check blood pressure twice a week and bring blood pressure logs to office visit)  -exercise at least 30 minutes a day, 5 days a week  -maintain healthy weight  -decrease or stop alcohol use  -do not smoke  -stay well hydrated (drink water only, avoid juices, sweet tea, and sodas)  -ask about staying up-to-date on vaccinations (flu vaccine, pneumonia  vaccine, hepatitis B vaccine)  -avoid excessive use of NSAIDs (ibuprofen, naproxen, Aleve, Advil, Toradol, Mobic), take Tylenol as needed for headache or mild pain  -take cholesterol lowering medications if prescribed (LDL goal less than 100)     Follow-up with the primary care provider as scheduled.  Return to subspecialty nephrology (kidney) clinic with routine labs in 4 months     Primary hypertension  Blood pressure reading is at goal, continue current antihypertensive regimen and 2 g a day dietary sodium restriction.             1/12/2023  Gala Ordaz NP  [facility]

## 2023-05-04 ENCOUNTER — LAB VISIT (OUTPATIENT)
Dept: LAB | Facility: HOSPITAL | Age: 68
End: 2023-05-04
Attending: NURSE PRACTITIONER
Payer: MEDICARE

## 2023-05-04 DIAGNOSIS — E55.9 HYPOVITAMINOSIS D: ICD-10-CM

## 2023-05-04 DIAGNOSIS — N18.32 CKD STAGE G3B/A2, GFR 30-44 AND ALBUMIN CREATININE RATIO 30-299 MG/G: ICD-10-CM

## 2023-05-04 LAB
ALBUMIN SERPL-MCNC: 3.8 G/DL (ref 3.4–4.8)
ALBUMIN/GLOB SERPL: 0.9 RATIO (ref 1.1–2)
ALP SERPL-CCNC: 73 UNIT/L (ref 40–150)
ALT SERPL-CCNC: 13 UNIT/L (ref 0–55)
AST SERPL-CCNC: 15 UNIT/L (ref 5–34)
BILIRUBIN DIRECT+TOT PNL SERPL-MCNC: 0.5 MG/DL
BUN SERPL-MCNC: 28.2 MG/DL (ref 8.4–25.7)
CALCIUM SERPL-MCNC: 9.9 MG/DL (ref 8.8–10)
CHLORIDE SERPL-SCNC: 104 MMOL/L (ref 98–107)
CO2 SERPL-SCNC: 24 MMOL/L (ref 23–31)
CREAT SERPL-MCNC: 1.77 MG/DL (ref 0.73–1.18)
DEPRECATED CALCIDIOL+CALCIFEROL SERPL-MC: 37.9 NG/ML (ref 30–80)
GFR SERPLBLD CREATININE-BSD FMLA CKD-EPI: 41 MLS/MIN/1.73/M2
GLOBULIN SER-MCNC: 4.1 GM/DL (ref 2.4–3.5)
GLUCOSE SERPL-MCNC: 111 MG/DL (ref 82–115)
PHOSPHATE SERPL-MCNC: 3.2 MG/DL (ref 2.3–4.7)
POTASSIUM SERPL-SCNC: 4.2 MMOL/L (ref 3.5–5.1)
PROT SERPL-MCNC: 7.9 GM/DL (ref 5.8–7.6)
SODIUM SERPL-SCNC: 137 MMOL/L (ref 136–145)

## 2023-05-04 PROCEDURE — 80053 COMPREHEN METABOLIC PANEL: CPT

## 2023-05-04 PROCEDURE — 36415 COLL VENOUS BLD VENIPUNCTURE: CPT

## 2023-05-04 PROCEDURE — 82306 VITAMIN D 25 HYDROXY: CPT

## 2023-05-04 PROCEDURE — 84100 ASSAY OF PHOSPHORUS: CPT

## 2023-05-11 ENCOUNTER — OFFICE VISIT (OUTPATIENT)
Dept: NEPHROLOGY | Facility: CLINIC | Age: 68
End: 2023-05-11
Payer: MEDICARE

## 2023-05-11 DIAGNOSIS — N18.32 CKD STAGE G3B/A2, GFR 30-44 AND ALBUMIN CREATININE RATIO 30-299 MG/G: Primary | ICD-10-CM

## 2023-05-11 DIAGNOSIS — I10 PRIMARY HYPERTENSION: ICD-10-CM

## 2023-05-11 PROCEDURE — 99443 PR PHYSICIAN TELEPHONE EVALUATION 21-30 MIN: ICD-10-PCS | Mod: 95,,, | Performed by: NURSE PRACTITIONER

## 2023-05-11 PROCEDURE — 99443 PR PHYSICIAN TELEPHONE EVALUATION 21-30 MIN: CPT | Mod: 95,,, | Performed by: NURSE PRACTITIONER

## 2023-05-11 NOTE — PROGRESS NOTES
Established Patient - Audio Only Telehealth Visit     The patient location is: Home  The chief complaint leading to consultation is:  Management of chronic kidney disease  Visit type: Virtual visit with audio only (telephone)  Total time spent with patient: 25 minutes     The reason for the audio only service rather than synchronous audio and video virtual visit was related to technical difficulties or patient preference/necessity.     Each patient to whom I provide medical services by telemedicine is:  (1) informed of the relationship between the physician and patient and the respective role of any other health care provider with respect to management of the patient; and (2) notified that they may decline to receive medical services by telemedicine and may withdraw from such care at any time. Patient verbally consented to receive this service via voice-only telephone call.    This service was not originating from a related E/M service provided within the previous 7 days nor will  to an E/M service or procedure within the next 24 hours or my soonest available appointment.  Prevailing standard of care was able to be met in this audio-only visit.      [facility]  Nephrology Clinic Note    Chief complaint: Chronic Kidney Disease (Follow up)    History of present illness:   Omer Booker is a 67 y.o. White male with past medical history of chronic kidney disease, heart failure, hypertension, diabetes mellitus type 2, atrial fibrillation, and demyelinating disease, and BPH.  Presents for follow-up appointment in Nephrology Clinic today.    Review of Systems  12 point review of systems conducted, negative except as stated in the history of present illness.    Allergies: Patient is allergic to lisinopril.     Past Medical History:  has a past medical history of BPH (benign prostatic hyperplasia), CKD (chronic kidney disease) stage 3, GFR 30-59 ml/min, Gout, unspecified, Heart failure,  unspecified, HTN (hypertension), Obesity, unspecified, and Paroxysmal atrial fibrillation.    Procedure History:  has a past surgical history that includes Cholecystectomy; Bronchoscopy; Bone marrow biopsy; and wisdom teeth removal.    Family History: family history includes Diabetes in his mother; Heart attack in his mother; Heart disease in his father; Kidney disease in his father.    Social History:  reports that he has never smoked. He has never used smokeless tobacco. He reports that he does not drink alcohol and does not use drugs.    Physical exam  There were no vitals taken for this visit.  General appearance: Patient is in no acute distress.    Home Medications:    Current Outpatient Medications:     atorvastatin (LIPITOR) 10 MG tablet, Take 10 mg by mouth once daily., Disp: , Rfl:     ELIQUIS 5 mg Tab, Take 5 mg by mouth 2 (two) times daily., Disp: , Rfl:     metoprolol succinate (TOPROL-XL) 100 MG 24 hr tablet, Take 50 mg by mouth once daily., Disp: , Rfl:     spironolactone (ALDACTONE) 25 MG tablet, Take 12.5 mg by mouth once daily., Disp: , Rfl:     Laboratory data    Serum  Lab Results   Component Value Date    WBC 5.3 10/10/2022    HGB 11.9 (L) 10/10/2022    HCT 36.3 (L) 10/10/2022     10/10/2022    IRON 52 (L) 01/06/2021    TIBC 277 01/06/2021    TRANS 238 01/06/2021    LABIRON 19 (L) 01/06/2021    FERRITIN 32.71 01/06/2021    GOIQSWNK04 185 (L) 10/22/2018     05/04/2023    K 4.2 05/04/2023    CHLORIDE 104 05/04/2023    CO2 24 05/04/2023    BUN 28.2 (H) 05/04/2023    CREATININE 1.77 (H) 05/04/2023    EGFRNORACEVR 41 05/04/2023    GLUCOSE 111 05/04/2023    CALCIUM 9.9 05/04/2023    ALKPHOS 73 05/04/2023    LABPROT 7.9 (H) 05/04/2023    ALBUMIN 3.8 05/04/2023    BILIDIR 0.3 04/05/2022    IBILI 0.50 04/05/2022    AST 15 05/04/2023    ALT 13 05/04/2023    MG 1.50 (L) 09/23/2022    PHOS 3.2 05/04/2023      Lab Results   Component Value Date    .1 (H) 10/10/2022    ZHJHGUPF75FO 37.9  05/04/2023    HIV Nonreactive 08/10/2018    HEPCAB Nonreactive 08/10/2018    HEPBSURFAG Negative 08/10/2018     Urine:  Lab Results   Component Value Date    COLORUA Light-Yellow 01/10/2023    APPEARANCEUA Clear 01/10/2023    SGUA 1.015 01/10/2023    PHUA 5.5 01/10/2023    PROTEINUA Trace (A) 01/10/2023    GLUCOSEUA Normal 01/10/2023    KETONESUA Negative 01/10/2023    BLOODUA Negative 01/10/2023    NITRITESUA Negative 01/10/2023    LEUKOCYTESUR Negative 01/10/2023    RBCUA 0-5 01/10/2023    WBCUA 0-5 01/10/2023    BACTERIA None Seen 01/10/2023    SQEPUA None Seen 01/10/2023    HYALINECASTS None Seen 01/10/2023    CREATRANDUR 162.7 01/10/2023    PROTEINURINE 13.3 01/10/2023    UPROTCREA 0.1 01/10/2023         Imaging  CT Abdomen Pelvis  Without Contrast 09/23/2022     Lines and Tubes:None.  Thorax:  Lungs:There is moderate nonspecific dependent change at the lung bases. No focal infiltrate or consolidation is seen.  Pleura:No effusions are seen. No pneumothorax is seen. There is mild pleural thickening involving the posterobasal segment of bilateral lower lobes.  Abdomen:  Abdominal Wall:There is diffuse atrophy of the abdominal muscles, gluteal muscles and paraspinal muscles.  Within limitations of noncontrast technique, no acute findings of the liver, pancreas and the spleen identified.  There is pancreatic atrophy.  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  Biliary System:No intrahepatic or extrahepatic biliary duct dilatation is seen.  Gallbladder:Surgical clips are seen in the gallbladder fossa which may reflect prior cholecystectomy.  Adrenals:The adrenal glands appear unremarkable.  Kidneys:There are few small non-obstructive calculi seen in the both kidneys, largest measuring 3.4 mm in the upper pole of the left kidney. The kidneys appear otherwise unremarkable with no hydronephrosis.  Bowel:  Esophagus:The visualized esophagus appears  unremarkable.  Stomach:The stomach appears unremarkable.  Duodenum:Unremarkable appearing duodenum.  Small Bowel:There are few variably dilated small bowel loops with no definitive transition point probably ileus. Follow as clinically indicated if there is clinical concern for developing small bowel obstruction.  Colon:Nondistended.  Appendix:The appendix appears unremarkable and is partially seen on image 63, Series 6.  Peritoneum:No intraperitoneal free air or ascites is seen.  Pelvis:  Bladder:The bladder is nondistended and cannot be definitively evaluated.  Male:  Prostate gland:The prostate gland appears unremarkable.  Bony structures:  Dorsal Spine:There is pronounced multilevel spondylosis of the visualized dorsal spine.  Bony Pelvis:There is pronounced degenerative change of the hip. There is diffuse osteoarthritis of bilateral hips.    Impression  Impression:  1. There are few variably dilated small bowel loops with no definitive transition point probably ileus. Follow as clinically indicated if there is clinical concern for developing small bowel obstruction.  2. Details and findings as discussed.  No significant discrepancy with overnight report.  Electronically signed by: Marco A Stern  Date:    09/23/2022  Time:    06:40      Impression and plan     CKD stage G3b/A2, GFR 30-44 and albumin creatinine ratio  mg/g  -     Comprehensive Metabolic Panel; Future; Expected date: 10/12/2023  -     Protein/Creatinine Ratio, Urine; Future; Expected date: 10/12/2023  -     Urinalysis, Reflex to Urine Culture; Future; Expected date: 10/12/2023  Diabetic kidney disease. Renal indices are stable, there is no significant proteinuria, imaging of the abdomen is without evidence of obstructive uropathy.   Continue risk factor management and MRA (patient is allergic to ACEi).   Continue:  -2 g a day dietary sodium restriction  -control diabetes (goal A1c less than 7%)  -control high blood pressure (goal blood  pressure is less than 130/80, please check blood pressure twice a week and bring blood pressure logs to office visit)  -exercise at least 30 minutes a day, 5 days a week  -maintain healthy weight  -decrease or stop alcohol use  -do not smoke  -stay well hydrated (drink water only, avoid juices, sweet tea, and sodas)  -ask about staying up-to-date on vaccinations (flu vaccine, pneumonia vaccine, hepatitis B vaccine)  -avoid excessive use of NSAIDs (ibuprofen, naproxen, Aleve, Advil, Toradol, Mobic), take Tylenol as needed for headache or mild pain  -take cholesterol lowering medications if prescribed (LDL goal less than 100)     Follow-up with the primary care provider as scheduled.  Return to subspecialty nephrology (kidney) clinic with routine labs in 6 months    Primary hypertension  Blood pressure reading is at goal, continue current antihypertensive regimen and 2 g a day dietary sodium restriction.       5/11/23  Gala Ordaz NP  [facility] Neph[facility]

## 2023-07-27 ENCOUNTER — HOSPITAL ENCOUNTER (EMERGENCY)
Facility: HOSPITAL | Age: 68
Discharge: HOME | End: 2023-07-27
Attending: EMERGENCY MEDICINE
Payer: MEDICARE

## 2023-07-27 VITALS
RESPIRATION RATE: 14 BRPM | HEART RATE: 59 BPM | BODY MASS INDEX: 31.42 KG/M2 | SYSTOLIC BLOOD PRESSURE: 146 MMHG | DIASTOLIC BLOOD PRESSURE: 84 MMHG | WEIGHT: 219 LBS | OXYGEN SATURATION: 100 % | TEMPERATURE: 98 F

## 2023-07-27 DIAGNOSIS — R55 SYNCOPE: ICD-10-CM

## 2023-07-27 LAB
ANION GAP SERPL CALC-SCNC: 12 MEQ/L
BASOPHILS # BLD AUTO: 0.01 X10(3)/MCL
BASOPHILS NFR BLD AUTO: 0.2 %
BNP BLD-MCNC: 63.4 PG/ML
BUN SERPL-MCNC: 26.3 MG/DL (ref 8.4–25.7)
CALCIUM SERPL-MCNC: 9.6 MG/DL (ref 8.8–10)
CHLORIDE SERPL-SCNC: 102 MMOL/L (ref 98–107)
CO2 SERPL-SCNC: 21 MMOL/L (ref 23–31)
CREAT SERPL-MCNC: 1.68 MG/DL (ref 0.73–1.18)
CREAT/UREA NIT SERPL: 16
EOSINOPHIL # BLD AUTO: 0.04 X10(3)/MCL (ref 0–0.9)
EOSINOPHIL NFR BLD AUTO: 0.9 %
ERYTHROCYTE [DISTWIDTH] IN BLOOD BY AUTOMATED COUNT: 13.6 % (ref 11.5–17)
GFR SERPLBLD CREATININE-BSD FMLA CKD-EPI: 44 MLS/MIN/1.73/M2
GLUCOSE SERPL-MCNC: 130 MG/DL (ref 82–115)
HCT VFR BLD AUTO: 36.7 % (ref 42–52)
HGB BLD-MCNC: 12.1 G/DL (ref 14–18)
IMM GRANULOCYTES # BLD AUTO: 0.02 X10(3)/MCL (ref 0–0.04)
IMM GRANULOCYTES NFR BLD AUTO: 0.5 %
LYMPHOCYTES # BLD AUTO: 0.6 X10(3)/MCL (ref 0.6–4.6)
LYMPHOCYTES NFR BLD AUTO: 14 %
MAGNESIUM SERPL-MCNC: 1.6 MG/DL (ref 1.6–2.6)
MCH RBC QN AUTO: 27.4 PG (ref 27–31)
MCHC RBC AUTO-ENTMCNC: 33 G/DL (ref 33–36)
MCV RBC AUTO: 83 FL (ref 80–94)
MONOCYTES # BLD AUTO: 0.35 X10(3)/MCL (ref 0.1–1.3)
MONOCYTES NFR BLD AUTO: 8.1 %
NEUTROPHILS # BLD AUTO: 3.28 X10(3)/MCL (ref 2.1–9.2)
NEUTROPHILS NFR BLD AUTO: 76.3 %
NRBC BLD AUTO-RTO: 0 %
PLATELET # BLD AUTO: 193 X10(3)/MCL (ref 130–400)
PMV BLD AUTO: 9.6 FL (ref 7.4–10.4)
POCT GLUCOSE: 121 MG/DL (ref 70–110)
POTASSIUM SERPL-SCNC: 4.6 MMOL/L (ref 3.5–5.1)
RBC # BLD AUTO: 4.42 X10(6)/MCL (ref 4.7–6.1)
SODIUM SERPL-SCNC: 135 MMOL/L (ref 136–145)
TROPONIN I SERPL-MCNC: 0.03 NG/ML (ref 0–0.04)
TROPONIN I SERPL-MCNC: 0.03 NG/ML (ref 0–0.04)
WBC # SPEC AUTO: 4.3 X10(3)/MCL (ref 4.5–11.5)

## 2023-07-27 PROCEDURE — 99284 EMERGENCY DEPT VISIT MOD MDM: CPT

## 2023-07-27 PROCEDURE — 85025 COMPLETE CBC W/AUTO DIFF WBC: CPT | Performed by: EMERGENCY MEDICINE

## 2023-07-27 PROCEDURE — 80048 BASIC METABOLIC PNL TOTAL CA: CPT | Performed by: EMERGENCY MEDICINE

## 2023-07-27 PROCEDURE — 63600175 PHARM REV CODE 636 W HCPCS: Performed by: EMERGENCY MEDICINE

## 2023-07-27 PROCEDURE — 82962 GLUCOSE BLOOD TEST: CPT

## 2023-07-27 PROCEDURE — 84484 ASSAY OF TROPONIN QUANT: CPT | Performed by: EMERGENCY MEDICINE

## 2023-07-27 PROCEDURE — 83735 ASSAY OF MAGNESIUM: CPT | Performed by: EMERGENCY MEDICINE

## 2023-07-27 PROCEDURE — 93005 ELECTROCARDIOGRAM TRACING: CPT

## 2023-07-27 PROCEDURE — 83880 ASSAY OF NATRIURETIC PEPTIDE: CPT | Performed by: EMERGENCY MEDICINE

## 2023-07-27 RX ADMIN — SODIUM CHLORIDE, POTASSIUM CHLORIDE, SODIUM LACTATE AND CALCIUM CHLORIDE 250 ML: 600; 310; 30; 20 INJECTION, SOLUTION INTRAVENOUS at 06:07

## 2023-07-27 NOTE — ED PROVIDER NOTES
"ED PROVIDER NOTE  7/27/2023    CHIEF COMPLAINT:   Chief Complaint   Patient presents with    Loss of Consciousness     Syncope at MD office, fasting for test, pt given "jareker",  now. No reported CP or SOB       HISTORY OF PRESENT ILLNESS:   Omer Booker is a 67 y.o. male who presents with chief complaint Syncope. Onset was just prior to arrival when he was at his doctor's office and had 3 episodes of syncope back-to-back then vomited afterwards.  He was sitting in his wheelchair when this happened and did not fall down or hit his head.  He states that he had not eaten anything since 2300 last night due to having some fasting labs today and felt weak today. Currenly asymptomatic. Feels weak all the time. Denies chest pain, palpitations, shortness of breath, abdominal pain, headache, diarrhea, dysuria, fever, chills.    The history is provided by the patient.       REVIEW OF SYSTEMS: as noted in the HPI.  NURSING NOTES REVIEWED      PAST MEDICAL/SURGICAL HISTORY:   Past Medical History:   Diagnosis Date    BPH (benign prostatic hyperplasia)     CKD (chronic kidney disease) stage 3, GFR 30-59 ml/min     Gout, unspecified     Heart failure, unspecified     HTN (hypertension)     Obesity, unspecified     Paroxysmal atrial fibrillation       Past Surgical History:   Procedure Laterality Date    BONE MARROW BIOPSY      BRONCHOSCOPY      CHOLECYSTECTOMY      wisdom teeth removal         FAMILY HISTORY:   Family History   Problem Relation Age of Onset    Diabetes Mother     Heart attack Mother     Kidney disease Father     Heart disease Father        SOCIAL HISTORY:   Social History     Tobacco Use    Smoking status: Never    Smokeless tobacco: Never   Substance Use Topics    Alcohol use: Never    Drug use: Never       ALLERGIES:   Review of patient's allergies indicates:   Allergen Reactions    Lisinopril Swelling       PHYSICAL EXAM:  Initial Vitals   BP Pulse Resp Temp SpO2   07/27/23 1746 07/27/23 1745 " 07/27/23 1746 07/27/23 1900 07/27/23 1746   (!) 151/81 64 18 98 °F (36.7 °C) 98 %      MAP       --                Physical Exam    Nursing note and vitals reviewed.  Constitutional: He appears well-developed and well-nourished. No distress.   HENT:   Head: Normocephalic and atraumatic.   Nose: Nose normal.   Mouth/Throat: Oropharynx is clear and moist and mucous membranes are normal.   Eyes: Conjunctivae and EOM are normal. Pupils are equal, round, and reactive to light.   Neck: Neck supple. No tracheal deviation present.   Cardiovascular:  Normal rate, regular rhythm, normal heart sounds, intact distal pulses and normal pulses.           Pulmonary/Chest: Effort normal and breath sounds normal. No respiratory distress.   Abdominal: Abdomen is soft. There is no abdominal tenderness. There is no rebound and no guarding.   Musculoskeletal:         General: Normal range of motion.      Cervical back: Neck supple.     Neurological: He is alert and oriented to person, place, and time. GCS eye subscore is 4. GCS verbal subscore is 5. GCS motor subscore is 6.   CN II-XII intact. Moves all extremities. No gross sensory or motor deficits.  Generalized weakness.   Skin: Skin is warm, dry and intact.   Psychiatric: He has a normal mood and affect. His speech is normal and behavior is normal. Judgment and thought content normal. Cognition and memory are normal.       RESULTS:  Labs Reviewed   BASIC METABOLIC PANEL - Abnormal; Notable for the following components:       Result Value    Sodium Level 135 (*)     Carbon Dioxide 21 (*)     Glucose Level 130 (*)     Blood Urea Nitrogen 26.3 (*)     Creatinine 1.68 (*)     All other components within normal limits   CBC WITH DIFFERENTIAL - Abnormal; Notable for the following components:    WBC 4.30 (*)     RBC 4.42 (*)     Hgb 12.1 (*)     Hct 36.7 (*)     All other components within normal limits   POCT GLUCOSE - Abnormal; Notable for the following components:    POCT Glucose 121 (*)      All other components within normal limits   B-TYPE NATRIURETIC PEPTIDE - Normal   MAGNESIUM - Normal   TROPONIN I - Normal   TROPONIN I - Normal   CBC W/ AUTO DIFFERENTIAL    Narrative:     The following orders were created for panel order CBC auto differential.  Procedure                               Abnormality         Status                     ---------                               -----------         ------                     CBC with Differential[[id number]]        Abnormal            Final result                 Please view results for these tests on the individual orders.   EXTRA TUBES    Narrative:     The following orders were created for panel order EXTRA TUBES.  Procedure                               Abnormality         Status                     ---------                               -----------         ------                     Light Blue Top Hold[[id number]]                              In process                   Please view results for these tests on the individual orders.   LIGHT BLUE TOP HOLD     Imaging Results    None         PROCEDURES:  Procedures    ECG:  EKG Readings: (Independently Interpreted)   Initial Reading: No STEMI. Rhythm: Normal Sinus Rhythm. Heart Rate: 62. Conduction: 1st Degree AV Block. Axis: Normal.     ED COURSE AND MEDICAL DECISION MAKING:  Medications   lactated ringers bolus 250 mL (0 mLs Intravenous Stopped 7/27/23 1912)     ED Course as of 07/28/23 2147   Thu Jul 27, 2023 1957 Creatinine(!): 1.68  Essentially baseline compared to previous labs. [IB]      ED Course User Index  [IB] Erich Sommers, DO        Medical Decision Making  67-year-old male who presents for evaluation after having syncopal episode at his doctor's office today.  States he would not had anything to eat since 2300 last night since he was required to have fasting labs obtained today.  He states he felt weak due to not eating and feels like this is why he passed out.  He has no focal  neurologic deficit on examination.  He does have generalized weakness which he states is secondary to his CIDP.  ECG shows normal sinus rhythm with first-degree AV block, this was present on prior ECGs.  No evidence of STEMI.  CBC shows mild leukopenia 4.3, hemoglobin 12.1 which is improved from his previous labs.  BMP shows no significant electrolyte abnormalities with BUN 26.3 and creatinine 1.68 which is essentially baseline compared to his previous labs.  BNP normal.  Initial and repeat troponin within normal limits.  He reports feeling better after small amount of IV fluids and eating come in his requesting discharge home.  Instructed to follow up outpatient with his PCP.  Given strict ED return precautions. I have spoken with the patient and/or caregivers. I have explained the patient's condition, diagnoses and treatment plan based on the information available to me at this time. I have answered the patient's and/or caregiver's questions and addressed any concerns. The patient and/or caregivers have as good an understanding of the patient's diagnosis, condition and treatment plan as can be expected at this point. The vital signs have been stable. The patient's condition is stable and appropriate for discharge from the emergency department.     The patient will pursue further outpatient evaluation with the primary care physician or other designated or consulting physician as outlined in the discharge instructions. The patient and/or caregivers are agreeable to this plan of care and follow-up instructions have been explained in detail. The patient and/or caregivers have received these instructions in written format and have expressed an understanding of the discharge instructions. The patient and/or caregivers are aware that any significant change in condition or worsening of symptoms should prompt an immediate return to this or the closest emergency department or a call to 911.    Problems Addressed:  Syncope:  complicated acute illness or injury     Details: Differential Diagnoses include but not limited to: Vasovagal Syncope, Orthostatic Hypotension, Severe Aortic Stenosis, Pulmonary Embolism, Pneumothorax, Myocardial Infarction, SVT, Ventricular Tachyarrhythmia, Hypertrophic Obstructive Cardiomyopathy, Long QT Syndrome.    Amount and/or Complexity of Data Reviewed  Labs: ordered. Decision-making details documented in ED Course.  ECG/medicine tests: ordered and independent interpretation performed. Decision-making details documented in ED Course.    Risk  Prescription drug management.  Decision regarding hospitalization.        CLINICAL IMPRESSION:  1. Syncope        DISPOSITION:   ED Disposition Condition    Discharge Stable            ED Prescriptions    None       Follow-up Information       Follow up With Specialties Details Why Contact Info    Luis Eduardo Pulliam MD Family Medicine Schedule an appointment as soon as possible for a visit   [address] [place] [id number] [phone]      [facility] Emergency Medicine  If symptoms worsen [address] [facility] [address] [phone]               Erich Sommers DO  07/28/23 4366

## 2023-08-16 LAB
INR PPP: 1.2
PROTHROMBIN TIME: 14.5 SECONDS (ref 11.4–14)

## 2023-11-08 ENCOUNTER — LAB VISIT (OUTPATIENT)
Dept: LAB | Facility: HOSPITAL | Age: 68
End: 2023-11-08
Attending: NURSE PRACTITIONER
Payer: MEDICARE

## 2023-11-08 DIAGNOSIS — N18.32 CKD STAGE G3B/A2, GFR 30-44 AND ALBUMIN CREATININE RATIO 30-299 MG/G: ICD-10-CM

## 2023-11-08 LAB
ALBUMIN SERPL-MCNC: 3.6 G/DL (ref 3.4–4.8)
ALBUMIN/GLOB SERPL: 1 RATIO (ref 1.1–2)
ALP SERPL-CCNC: 74 UNIT/L (ref 40–150)
ALT SERPL-CCNC: 10 UNIT/L (ref 0–55)
AST SERPL-CCNC: 15 UNIT/L (ref 5–34)
BILIRUB SERPL-MCNC: 0.6 MG/DL
BUN SERPL-MCNC: 22.8 MG/DL (ref 8.4–25.7)
CALCIUM SERPL-MCNC: 9.2 MG/DL (ref 8.8–10)
CHLORIDE SERPL-SCNC: 106 MMOL/L (ref 98–107)
CO2 SERPL-SCNC: 25 MMOL/L (ref 23–31)
CREAT SERPL-MCNC: 1.76 MG/DL (ref 0.73–1.18)
GFR SERPLBLD CREATININE-BSD FMLA CKD-EPI: 41 MLS/MIN/1.73/M2
GLOBULIN SER-MCNC: 3.7 GM/DL (ref 2.4–3.5)
GLUCOSE SERPL-MCNC: 119 MG/DL (ref 82–115)
POTASSIUM SERPL-SCNC: 4.3 MMOL/L (ref 3.5–5.1)
PROT SERPL-MCNC: 7.3 GM/DL (ref 5.8–7.6)
SODIUM SERPL-SCNC: 139 MMOL/L (ref 136–145)

## 2023-11-08 PROCEDURE — 36415 COLL VENOUS BLD VENIPUNCTURE: CPT

## 2023-11-08 PROCEDURE — 80053 COMPREHEN METABOLIC PANEL: CPT

## 2023-11-13 RX ORDER — AMLODIPINE BESYLATE 10 MG/1
10 TABLET ORAL DAILY
COMMUNITY
Start: 2023-07-27

## 2023-11-13 RX ORDER — METFORMIN HYDROCHLORIDE 500 MG/1
500 TABLET, EXTENDED RELEASE ORAL 2 TIMES DAILY
COMMUNITY
Start: 2023-07-27

## 2023-11-13 RX ORDER — METOPROLOL TARTRATE 50 MG/1
50 TABLET ORAL 2 TIMES DAILY
COMMUNITY
Start: 2023-10-22

## 2023-11-13 NOTE — PROGRESS NOTES
This is a telemedicine note.   I have reviewed the patient's name and date of birth.  I have verbally reviewed the past medical history, active medication list, and allergies with the patient.  I have verified that this patient is in the state of [place].  Patient was treated using telemedicine, real-time audio and video, according to [facility] protocols. I, distant provider, conducted the visit from [facility] and [facility] in [place], [place]. The patient participated in the visit at a non-[facility] location. Patient was located at: Home. I am licensed in the state where the patient stated they are located. The patient stated that they understood and accepted the privacy and security risks to their information in their location.    Verbal consent to participate in interactive audio & video visit was obtained. Patient was explained that:  - Our sessions are not being recorded and that personal health information is protected   - I would evaluate the patient and recommend diagnostics and treatments based on my assessment  - The team will provide follow up care in person if/when the patient needs it.    Total time spent with patient was 25 minutes, over 50% of which was used for education and counseling regarding medical conditions, current medications including risk/benefit and side effects/adverse events, over-the-counter medication uses/doses, home self-care and contact precautions, and red flags and indications for immediate medical attention. The patient is receptive, expresses understanding and is agreeable to plan. All questions answered and concerns addressed.    [facility]  Nephrology Clinic Note    Chief complaint: Chronic Kidney Disease (/84, w/o complaints)    History of present illness:   Omer Booker is a 67 y.o. White male with past medical history of chronic kidney disease, heart failure, hypertension, diabetes mellitus type 2, atrial fibrillation,  and demyelinating disease, and BPH.  Presents for follow-up appointment in Nephrology Clinic today.    Review of Systems  12 point review of systems conducted, negative except as stated in the history of present illness.    Allergies: Patient is allergic to lisinopril.     Past Medical History:  has a past medical history of BPH (benign prostatic hyperplasia), CKD (chronic kidney disease) stage 3, GFR 30-59 ml/min, Gout, unspecified, Heart failure, unspecified, HTN (hypertension), Obesity, unspecified, and Paroxysmal atrial fibrillation.    Procedure History:  has a past surgical history that includes Cholecystectomy; Bronchoscopy; Bone marrow biopsy; and wisdom teeth removal.    Family History: family history includes Diabetes in his mother; Heart attack in his mother; Heart disease in his father; Kidney disease in his father.    Social History:  reports that he has never smoked. He has never used smokeless tobacco. He reports that he does not drink alcohol and does not use drugs.    Home Medications:    Current Outpatient Medications:     amLODIPine (NORVASC) 10 MG tablet, Take 10 mg by mouth once daily., Disp: , Rfl:     atorvastatin (LIPITOR) 10 MG tablet, Take 10 mg by mouth once daily., Disp: , Rfl:     ELIQUIS 5 mg Tab, Take 5 mg by mouth 2 (two) times daily., Disp: , Rfl:     metFORMIN (GLUCOPHAGE-XR) 500 MG ER 24hr tablet, Take 500 mg by mouth 2 (two) times daily., Disp: , Rfl:     metoprolol tartrate (LOPRESSOR) 50 MG tablet, Take 50 mg by mouth 2 (two) times daily., Disp: , Rfl:     spironolactone (ALDACTONE) 25 MG tablet, Take 12.5 mg by mouth once daily., Disp: , Rfl:     metoprolol succinate (TOPROL-XL) 100 MG 24 hr tablet, Take 50 mg by mouth once daily., Disp: , Rfl:     Laboratory data    Serum  Lab Results   Component Value Date    WBC 4.30 (L) 07/27/2023    HGB 12.1 (L) 07/27/2023    HCT 36.7 (L) 07/27/2023     07/27/2023    IRON 52 (L) 01/06/2021    TIBC 277 01/06/2021    TRANS 238  01/06/2021    LABIRON 19 (L) 01/06/2021    FERRITIN 32.71 01/06/2021    [id number] 185 (L) 10/22/2018     11/08/2023    K 4.3 11/08/2023    CHLORIDE 106 11/08/2023    CO2 25 11/08/2023    BUN 22.8 11/08/2023    CREATININE 1.76 (H) 11/08/2023    EGFRNORACEVR 41 11/08/2023    GLUCOSE 119 (H) 11/08/2023    CALCIUM 9.2 11/08/2023    ALKPHOS 74 11/08/2023    LABPROT 7.3 11/08/2023    ALBUMIN 3.6 11/08/2023    BILIDIR 0.3 04/05/2022    IBILI 0.50 04/05/2022    AST 15 11/08/2023    ALT 10 11/08/2023    MG 1.60 07/27/2023    PHOS 3.2 05/04/2023      Lab Results   Component Value Date    .1 (H) 10/10/2022    JOAVFJCA56ME 37.9 05/04/2023    HIV Nonreactive 08/10/2018    HEPCAB Nonreactive 08/10/2018    HEPBSURFAG Negative 08/10/2018     Urine:  Lab Results   Component Value Date    COLORUA Light-Yellow 11/08/2023    APPEARANCEUA Clear 11/08/2023    SGUA 1.017 11/08/2023    PHUA 5.5 11/08/2023    PROTEINUA Trace (A) 11/08/2023    GLUCOSEUA Normal 11/08/2023    KETONESUA Negative 11/08/2023    BLOODUA Negative 11/08/2023    NITRITESUA Negative 11/08/2023    LEUKOCYTESUR 75 (A) 11/08/2023    RBCUA 0-5 11/08/2023    WBCUA 6-10 (A) 11/08/2023    BACTERIA None Seen 11/08/2023    SQEPUA Trace (A) 11/08/2023    HYALINECASTS None Seen 11/08/2023    CREATRANDUR 176.0 (H) 11/08/2023    PROTEINURINE 16.2 11/08/2023    UPROTCREA 0.1 11/08/2023         Imaging  CT Abdomen Pelvis  Without Contrast 09/23/2022  Lines and Tubes:None.  Thorax:  Lungs:There is moderate nonspecific dependent change at the lung bases. No focal infiltrate or consolidation is seen.  Pleura:No effusions are seen. No pneumothorax is seen. There is mild pleural thickening involving the posterobasal segment of bilateral lower lobes.  Abdomen:  Abdominal Wall:There is diffuse atrophy of the abdominal muscles, gluteal muscles and paraspinal muscles.  Within limitations of noncontrast technique, no acute findings of the liver, pancreas and the spleen  identified.  There is pancreatic atrophy.  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  Biliary System:No intrahepatic or extrahepatic biliary duct dilatation is seen.  Gallbladder:Surgical clips are seen in the gallbladder fossa which may reflect prior cholecystectomy.  Adrenals:The adrenal glands appear unremarkable.  Kidneys:There are few small non-obstructive calculi seen in the both kidneys, largest measuring 3.4 mm in the upper pole of the left kidney. The kidneys appear otherwise unremarkable with no hydronephrosis.  Bowel:  Esophagus:The visualized esophagus appears unremarkable.  Stomach:The stomach appears unremarkable.  Duodenum:Unremarkable appearing duodenum.  Small Bowel:There are few variably dilated small bowel loops with no definitive transition point probably ileus. Follow as clinically indicated if there is clinical concern for developing small bowel obstruction.  Colon:Nondistended.  Appendix:The appendix appears unremarkable and is partially seen on image 63, Series 6.  Peritoneum:No intraperitoneal free air or ascites is seen.  Pelvis:  Bladder:The bladder is nondistended and cannot be definitively evaluated.  Male:  Prostate gland:The prostate gland appears unremarkable.  Bony structures:  Dorsal Spine:There is pronounced multilevel spondylosis of the visualized dorsal spine.  Bony Pelvis:There is pronounced degenerative change of the hip. There is diffuse osteoarthritis of bilateral hips.    Impression  Impression:  1. There are few variably dilated small bowel loops with no definitive transition point probably ileus. Follow as clinically indicated if there is clinical concern for developing small bowel obstruction.  2. Details and findings as discussed.  No significant discrepancy with overnight report.  Electronically signed by: Marco A Stern  Date:    09/23/2022  Time:    06:40        Impression    ICD-10-CM ICD-9-CM   1. CKD stage G3b/A2, GFR  30-44 and albumin creatinine ratio  mg/g  N18.32 585.3   2. Primary hypertension  I10 401.9   3. Anemia of chronic disease  D63.8 285.29        Plan     CKD stage G3b/A2, GFR 30-44 and albumin creatinine ratio  mg/g  -     CBC Auto Differential; Future; Expected date: 05/07/2024  -     Comprehensive Metabolic Panel; Future; Expected date: 05/07/2024  -     Protein/Creatinine Ratio, Urine; Future; Expected date: 05/07/2024  -     Urinalysis, Reflex to Urine Culture; Future; Expected date: 05/07/2024  Diabetic kidney disease. Renal indices are stable, there is no significant proteinuria, imaging of the abdomen is without evidence of obstructive uropathy.   Continue risk factor management and MRA (patient is allergic to ACEi).   Continue:  -2 g a day dietary sodium restriction  -control diabetes (goal A1c less than 7%)  -control high blood pressure (goal blood pressure is less than 130/80, please check blood pressure twice a week and bring blood pressure logs to office visit)  -exercise at least 30 minutes a day, 5 days a week  -maintain healthy weight  -decrease or stop alcohol use  -do not smoke  -stay well hydrated (drink water only, avoid juices, sweet tea, and sodas)  -ask about staying up-to-date on vaccinations (flu vaccine, pneumonia vaccine, hepatitis B vaccine)  -avoid excessive use of NSAIDs (ibuprofen, naproxen, Aleve, Advil, Toradol, Mobic), take Tylenol as needed for headache or mild pain  -take cholesterol lowering medications if prescribed (LDL goal less than 100)     Follow-up with the primary care provider as scheduled.  Return to subspecialty nephrology (kidney) clinic with routine labs in 6 months     Primary hypertension  Blood pressure reading is at goal, continue current antihypertensive regimen and 2 g a day dietary sodium restriction.      Anemia of chronic disease  Continue to monitor CBC.        11/14/2023  Gala Ordaz NP  [facility] Nephrology

## 2023-11-14 ENCOUNTER — OFFICE VISIT (OUTPATIENT)
Dept: NEPHROLOGY | Facility: CLINIC | Age: 68
End: 2023-11-14
Payer: MEDICARE

## 2023-11-14 DIAGNOSIS — I10 PRIMARY HYPERTENSION: ICD-10-CM

## 2023-11-14 DIAGNOSIS — D63.8 ANEMIA OF CHRONIC DISEASE: ICD-10-CM

## 2023-11-14 DIAGNOSIS — N18.32 CKD STAGE G3B/A2, GFR 30-44 AND ALBUMIN CREATININE RATIO 30-299 MG/G: Primary | ICD-10-CM

## 2023-11-14 PROCEDURE — 99213 OFFICE O/P EST LOW 20 MIN: CPT | Mod: 95,,, | Performed by: NURSE PRACTITIONER

## 2023-11-14 PROCEDURE — 99213 PR OFFICE/OUTPT VISIT, EST, LEVL III, 20-29 MIN: ICD-10-PCS | Mod: 95,,, | Performed by: NURSE PRACTITIONER

## 2024-05-20 ENCOUNTER — LAB VISIT (OUTPATIENT)
Dept: LAB | Facility: HOSPITAL | Age: 69
End: 2024-05-20
Attending: NURSE PRACTITIONER
Payer: MEDICARE

## 2024-05-20 DIAGNOSIS — N18.32 CKD STAGE G3B/A2, GFR 30-44 AND ALBUMIN CREATININE RATIO 30-299 MG/G: ICD-10-CM

## 2024-05-20 LAB
CREAT UR-MCNC: 188.4 MG/DL (ref 63–166)
PROT UR STRIP-MCNC: 12.3 MG/DL
URINE PROTEIN/CREATININE RATIO (OLG): 0.1

## 2024-05-20 PROCEDURE — 82570 ASSAY OF URINE CREATININE: CPT

## 2024-05-28 ENCOUNTER — OFFICE VISIT (OUTPATIENT)
Dept: NEPHROLOGY | Facility: CLINIC | Age: 69
End: 2024-05-28
Payer: MEDICARE

## 2024-05-28 DIAGNOSIS — I10 PRIMARY HYPERTENSION: ICD-10-CM

## 2024-05-28 DIAGNOSIS — E11.69 TYPE 2 DIABETES MELLITUS WITH OTHER SPECIFIED COMPLICATION, UNSPECIFIED WHETHER LONG TERM INSULIN USE: ICD-10-CM

## 2024-05-28 DIAGNOSIS — N18.32 CKD STAGE G3B/A2, GFR 30-44 AND ALBUMIN CREATININE RATIO 30-299 MG/G: Primary | ICD-10-CM

## 2024-05-28 DIAGNOSIS — I50.22 CHRONIC SYSTOLIC HEART FAILURE: ICD-10-CM

## 2024-05-28 PROCEDURE — 99214 OFFICE O/P EST MOD 30 MIN: CPT | Mod: 95,,, | Performed by: NURSE PRACTITIONER

## 2024-05-28 NOTE — PROGRESS NOTES
This is a telemedicine note.   I have reviewed the patient's name and date of birth.  I have verbally reviewed the past medical history, active medication list, and allergies with the patient.  I have verified that this patient is in the state of [place].  Patient was treated using telemedicine, real-time audio and video, according to [facility] protocols. I, distant provider, conducted the visit from [facility] and [facility] in [place], [place]. The patient participated in the visit at a non-[facility] location. Patient was located at: Home. I am licensed in the state where the patient stated they are located. The patient stated that they understood and accepted the privacy and security risks to their information in their location.    Verbal consent to participate in interactive audio & video visit was obtained. Patient was explained that:  - Our sessions are not being recorded and that personal health information is protected   - I would evaluate the patient and recommend diagnostics and treatments based on my assessment  - The team will provide follow up care in person if/when the patient needs it.    Total time spent with patient was 25 minutes, over 50% of which was used for education and counseling regarding medical conditions, current medications including risk/benefit and side effects/adverse events, over-the-counter medication uses/doses, home self-care and contact precautions, and red flags and indications for immediate medical attention. The patient is receptive, expresses understanding and is agreeable to plan. All questions answered and concerns addressed.    [facility]  Nephrology Clinic Note    Chief complaint: Chronic Kidney Disease (Follow up)    History of present illness:   Omer Booker is a 68 y.o. White male with past medical history of chronic kidney disease, heart failure, hypertension, diabetes mellitus type 2, atrial fibrillation, demyelinating  disease, and BPH. Presents for follow-up appointment in Nephrology Clinic today.    Review of Systems  12 point review of systems conducted, negative except as stated in the history of present illness.    Allergies: Patient is allergic to lisinopril.     Past Medical History:  has a past medical history of BPH (benign prostatic hyperplasia), CKD (chronic kidney disease) stage 3, GFR 30-59 ml/min, Gout, unspecified, Heart failure, unspecified, HTN (hypertension), Obesity, unspecified, and Paroxysmal atrial fibrillation.    Procedure History:  has a past surgical history that includes Cholecystectomy; Bronchoscopy; Bone marrow biopsy; and wisdom teeth removal.    Family History: family history includes Diabetes in his mother; Heart attack in his mother; Heart disease in his father; Kidney disease in his father.    Social History:  reports that he has never smoked. He has never used smokeless tobacco. He reports that he does not drink alcohol and does not use drugs.    Physical exam  General appearance: Patient is in no acute distress.     Home Medications:    Current Outpatient Medications:     amLODIPine (NORVASC) 10 MG tablet, Take 10 mg by mouth once daily., Disp: , Rfl:     ELIQUIS 5 mg Tab, Take 5 mg by mouth 2 (two) times daily., Disp: , Rfl:     metoprolol tartrate (LOPRESSOR) 50 MG tablet, Take 50 mg by mouth 2 (two) times daily., Disp: , Rfl:     spironolactone (ALDACTONE) 25 MG tablet, Take 25 mg by mouth every other day., Disp: , Rfl:     Laboratory data    Lab Results   Component Value Date    WBC 4.38 (L) 05/20/2024    HGB 12.5 (L) 05/20/2024    HCT 37.1 (L) 05/20/2024     05/20/2024    IRON 52 (L) 01/06/2021    TIBC 277 01/06/2021    TRANS 238 01/06/2021    LABIRON 19 (L) 01/06/2021    FERRITIN 32.71 01/06/2021    [id number] 185 (L) 10/22/2018     05/20/2024    K 4.3 05/20/2024    CHLORIDE 105 05/20/2024    CO2 24 05/20/2024    BUN 22.1 05/20/2024    CREATININE 2.03 (H) 05/20/2024     EGFRNORACEVR 35 05/20/2024    GLUCOSE 117 (H) 05/20/2024    CALCIUM 9.5 05/20/2024    ALKPHOS 61 05/20/2024    LABPROT 7.4 05/20/2024    ALBUMIN 3.7 05/20/2024    BILIDIR 0.3 04/05/2022    IBILI 0.50 04/05/2022    AST 17 05/20/2024    ALT 11 05/20/2024    MG 1.60 07/27/2023    PHOS 3.2 05/04/2023      Lab Results   Component Value Date    .1 (H) 10/10/2022    [id number] 37.9 05/04/2023    HIV Nonreactive 08/10/2018    HEPCAB Nonreactive 08/10/2018    HEPBSURFAG Negative 08/10/2018     Urine:  Lab Results   Component Value Date    COLORUA Light-Yellow 11/08/2023    APPEARANCEUA Clear 11/08/2023    SGUA 1.017 11/08/2023    PHUA 5.5 11/08/2023    PROTEINUA Trace (A) 11/08/2023    GLUCOSEUA Normal 11/08/2023    KETONESUA Negative 11/08/2023    BLOODUA Negative 11/08/2023    NITRITESUA Negative 11/08/2023    LEUKOCYTESUR 75 (A) 11/08/2023    RBCUA 0-5 11/08/2023    WBCUA 6-10 (A) 11/08/2023    BACTERIA None Seen 11/08/2023    SQEPUA Trace (A) 11/08/2023    HYALINECASTS None Seen 11/08/2023    CREATRANDUR 188.4 (H) 05/20/2024    PROTEINURINE 12.3 05/20/2024    UPROTCREA 0.1 05/20/2024         Imaging  CT Abdomen Pelvis  Without Contrast 09/23/2022  Lines and Tubes:None.  Thorax:  Lungs:There is moderate nonspecific dependent change at the lung bases. No focal infiltrate or consolidation is seen.  Pleura:No effusions are seen. No pneumothorax is seen. There is mild pleural thickening involving the posterobasal segment of bilateral lower lobes.  Abdomen:  Abdominal Wall:There is diffuse atrophy of the abdominal muscles, gluteal muscles and paraspinal muscles.  Within limitations of noncontrast technique, no acute findings of the liver, pancreas and the spleen identified.  There is pancreatic atrophy.  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  Biliary System:No intrahepatic or extrahepatic biliary duct dilatation is seen.  Gallbladder:Surgical clips are seen in the  gallbladder fossa which may reflect prior cholecystectomy.  Adrenals:The adrenal glands appear unremarkable.  Kidneys:There are few small non-obstructive calculi seen in the both kidneys, largest measuring 3.4 mm in the upper pole of the left kidney. The kidneys appear otherwise unremarkable with no hydronephrosis.  Bowel:  Esophagus:The visualized esophagus appears unremarkable.  Stomach:The stomach appears unremarkable.  Duodenum:Unremarkable appearing duodenum.  Small Bowel:There are few variably dilated small bowel loops with no definitive transition point probably ileus. Follow as clinically indicated if there is clinical concern for developing small bowel obstruction.  Colon:Nondistended.  Appendix:The appendix appears unremarkable and is partially seen on image 63, Series 6.  Peritoneum:No intraperitoneal free air or ascites is seen.  Pelvis:  Bladder:The bladder is nondistended and cannot be definitively evaluated.  Male:  Prostate gland:The prostate gland appears unremarkable.  Bony structures:  Dorsal Spine:There is pronounced multilevel spondylosis of the visualized dorsal spine.  Bony Pelvis:There is pronounced degenerative change of the hip. There is diffuse osteoarthritis of bilateral hips.     Impression  Impression:  1. There are few variably dilated small bowel loops with no definitive transition point probably ileus. Follow as clinically indicated if there is clinical concern for developing small bowel obstruction.  2. Details and findings as discussed.    Impression    ICD-10-CM ICD-9-CM   1. CKD stage G3b/A2, GFR 30-44 and albumin creatinine ratio  mg/g  N18.32 585.3   2. Chronic systolic heart failure  I50.22 428.22   3. Type 2 diabetes mellitus with other specified complication, unspecified whether long term insulin use  E11.69 250.80   4. Primary hypertension  I10 401.9        Plan  CKD stage G3b/A2, GFR 30-44 and albumin creatinine ratio  mg/g  -     Comprehensive Metabolic Panel;  Future; Expected date: 07/15/2024  -     Phosphorus; Future; Expected date: 07/15/2024  -     PTH, Intact; Future; Expected date: 07/15/2024  Diabetic kidney disease. There is no significant proteinuria, imaging of the abdomen is without evidence of obstructive uropathy.   Continue risk factor management and MRA (patient is allergic to ACEi).   Continue:  -2 g a day dietary sodium restriction  -control diabetes (goal A1c less than 7%)  -control high blood pressure (goal blood pressure is less than 130/80, please check blood pressure twice a week and bring blood pressure logs to office visit)  -exercise at least 30 minutes a day, 5 days a week  -maintain healthy weight  -decrease or stop alcohol use  -do not smoke  -stay well hydrated (drink water only, avoid juices, sweet tea, and sodas)  -ask about staying up-to-date on vaccinations (flu vaccine, pneumonia vaccine, hepatitis B vaccine)  -avoid excessive use of NSAIDs (ibuprofen, naproxen, Aleve, Advil, Toradol, Mobic), take Tylenol as needed for headache or mild pain  -take cholesterol lowering medications if prescribed (LDL goal less than 100)     Follow-up with the primary care provider as scheduled.    Chronic systolic heart failure  -     Ambulatory referral/consult to Cardiology; Future; Expected date: 06/04/2024  Patient was lost to follow up in Cardiology clinic, will re-refer for continuation of care.     Type 2 diabetes mellitus with other specified complication, unspecified whether long term insulin use  Will defer management to PCP     Primary hypertension  Blood pressure reading is at goal, continue current antihypertensive regimen and 2 g a day dietary sodium restriction.        Follow up in about 8 weeks (around 7/23/2024).     5/28/2024  Gala Ordaz NP  [facility] Nephrology

## 2024-06-17 ENCOUNTER — HOSPITAL ENCOUNTER (EMERGENCY)
Facility: HOSPITAL | Age: 69
Discharge: HOME | End: 2024-06-17
Attending: INTERNAL MEDICINE
Payer: MEDICARE

## 2024-06-17 VITALS
BODY MASS INDEX: 31.56 KG/M2 | TEMPERATURE: 98 F | HEART RATE: 67 BPM | WEIGHT: 220.44 LBS | HEIGHT: 70 IN | SYSTOLIC BLOOD PRESSURE: 132 MMHG | RESPIRATION RATE: 18 BRPM | DIASTOLIC BLOOD PRESSURE: 86 MMHG | OXYGEN SATURATION: 99 %

## 2024-06-17 DIAGNOSIS — N18.9 CHRONIC KIDNEY DISEASE, UNSPECIFIED CKD STAGE: Primary | ICD-10-CM

## 2024-06-17 LAB
ALBUMIN SERPL-MCNC: 4.1 G/DL (ref 3.4–4.8)
ALBUMIN/GLOB SERPL: 1 RATIO (ref 1.1–2)
ALP SERPL-CCNC: 77 UNIT/L (ref 40–150)
ALT SERPL-CCNC: 25 UNIT/L (ref 0–55)
ANION GAP SERPL CALC-SCNC: 12 MEQ/L
AST SERPL-CCNC: 29 UNIT/L (ref 5–34)
BACTERIA #/AREA URNS AUTO: ABNORMAL /HPF
BASOPHILS # BLD AUTO: 0.02 X10(3)/MCL
BASOPHILS NFR BLD AUTO: 0.3 %
BILIRUB SERPL-MCNC: 1.1 MG/DL
BILIRUB UR QL STRIP.AUTO: NEGATIVE
BUN SERPL-MCNC: 35.5 MG/DL (ref 8.4–25.7)
CALCIUM SERPL-MCNC: 10.1 MG/DL (ref 8.8–10)
CHLORIDE SERPL-SCNC: 98 MMOL/L (ref 98–107)
CLARITY UR: CLEAR
CO2 SERPL-SCNC: 24 MMOL/L (ref 23–31)
COLOR UR AUTO: YELLOW
CREAT SERPL-MCNC: 1.99 MG/DL (ref 0.73–1.18)
CREAT/UREA NIT SERPL: 18
EOSINOPHIL # BLD AUTO: 0.07 X10(3)/MCL (ref 0–0.9)
EOSINOPHIL NFR BLD AUTO: 0.9 %
ERYTHROCYTE [DISTWIDTH] IN BLOOD BY AUTOMATED COUNT: 13.2 % (ref 11.5–17)
GFR SERPLBLD CREATININE-BSD FMLA CKD-EPI: 35 ML/MIN/1.73/M2
GLOBULIN SER-MCNC: 4.1 GM/DL (ref 2.4–3.5)
GLUCOSE SERPL-MCNC: 149 MG/DL (ref 82–115)
GLUCOSE UR QL STRIP: NORMAL
HCT VFR BLD AUTO: 43.3 % (ref 42–52)
HGB BLD-MCNC: 14.6 G/DL (ref 14–18)
HGB UR QL STRIP: NEGATIVE
HOLD SPECIMEN: NORMAL
HYALINE CASTS #/AREA URNS LPF: ABNORMAL /LPF
IMM GRANULOCYTES # BLD AUTO: 0.04 X10(3)/MCL (ref 0–0.04)
IMM GRANULOCYTES NFR BLD AUTO: 0.5 %
KETONES UR QL STRIP: NEGATIVE
LEUKOCYTE ESTERASE UR QL STRIP: NEGATIVE
LIPASE SERPL-CCNC: 24 U/L
LYMPHOCYTES # BLD AUTO: 1.1 X10(3)/MCL (ref 0.6–4.6)
LYMPHOCYTES NFR BLD AUTO: 14.5 %
MCH RBC QN AUTO: 29.6 PG (ref 27–31)
MCHC RBC AUTO-ENTMCNC: 33.7 G/DL (ref 33–36)
MCV RBC AUTO: 87.7 FL (ref 80–94)
MONOCYTES # BLD AUTO: 0.58 X10(3)/MCL (ref 0.1–1.3)
MONOCYTES NFR BLD AUTO: 7.6 %
MUCOUS THREADS URNS QL MICRO: ABNORMAL /LPF
NEUTROPHILS # BLD AUTO: 5.78 X10(3)/MCL (ref 2.1–9.2)
NEUTROPHILS NFR BLD AUTO: 76.2 %
NITRITE UR QL STRIP: NEGATIVE
NRBC BLD AUTO-RTO: 0 %
PH UR STRIP: 5.5 [PH]
PLATELET # BLD AUTO: 249 X10(3)/MCL (ref 130–400)
PMV BLD AUTO: 9.2 FL (ref 7.4–10.4)
POTASSIUM SERPL-SCNC: 4.3 MMOL/L (ref 3.5–5.1)
PROT SERPL-MCNC: 8.2 GM/DL (ref 5.8–7.6)
PROT UR QL STRIP: ABNORMAL
RBC # BLD AUTO: 4.94 X10(6)/MCL (ref 4.7–6.1)
RBC #/AREA URNS AUTO: ABNORMAL /HPF
SODIUM SERPL-SCNC: 134 MMOL/L (ref 136–145)
SP GR UR STRIP.AUTO: 1.02 (ref 1–1.03)
SQUAMOUS #/AREA URNS LPF: ABNORMAL /HPF
UROBILINOGEN UR STRIP-ACNC: NORMAL
WBC # BLD AUTO: 7.59 X10(3)/MCL (ref 4.5–11.5)
WBC #/AREA URNS AUTO: ABNORMAL /HPF

## 2024-06-17 PROCEDURE — 83690 ASSAY OF LIPASE: CPT | Performed by: INTERNAL MEDICINE

## 2024-06-17 PROCEDURE — 99283 EMERGENCY DEPT VISIT LOW MDM: CPT

## 2024-06-17 PROCEDURE — 81001 URINALYSIS AUTO W/SCOPE: CPT | Performed by: INTERNAL MEDICINE

## 2024-06-17 PROCEDURE — 85025 COMPLETE CBC W/AUTO DIFF WBC: CPT | Performed by: INTERNAL MEDICINE

## 2024-06-17 PROCEDURE — 80053 COMPREHEN METABOLIC PANEL: CPT | Performed by: INTERNAL MEDICINE

## 2024-06-17 NOTE — ED PROVIDER NOTES
Encounter Date: 6/17/2024       History     Chief Complaint   Patient presents with    Abdominal Pain    Vomiting    Weakness     Reports nausea vomiting diarrhea for 3 days and abd pain      Presents with right flank pain today associated to nausea and vomiting. States pain is resolved now. States this pain has been on and off for years. Hx of cholecystectomy. Denies rash, fever, dysuria, hematuria or BM problems.    The history is provided by the patient.     Review of patient's allergies indicates:   Allergen Reactions    Lisinopril Swelling     Past Medical History:   Diagnosis Date    BPH (benign prostatic hyperplasia)     CKD (chronic kidney disease) stage 3, GFR 30-59 ml/min     Gout, unspecified     Heart failure, unspecified     HTN (hypertension)     Obesity, unspecified     Paroxysmal atrial fibrillation      Past Surgical History:   Procedure Laterality Date    BONE MARROW BIOPSY      BRONCHOSCOPY      CHOLECYSTECTOMY      wisdom teeth removal       Family History   Problem Relation Name Age of Onset    Diabetes Mother      Heart attack Mother      Kidney disease Father      Heart disease Father       Social History     Tobacco Use    Smoking status: Never    Smokeless tobacco: Never   Substance Use Topics    Alcohol use: Never    Drug use: Never     Review of Systems   Gastrointestinal:  Positive for nausea and vomiting.   Genitourinary:  Positive for flank pain.   All other systems reviewed and are negative.      Physical Exam     Initial Vitals [06/17/24 1037]   BP Pulse Resp Temp SpO2   (!) 147/95 73 18 97.7 °F (36.5 °C) 98 %      MAP       --         Physical Exam    Nursing note and vitals reviewed.  Constitutional: He appears well-developed and well-nourished. No distress.   HENT:   Head: Normocephalic and atraumatic.   Mouth/Throat: Oropharynx is clear and moist.   Eyes: Conjunctivae and EOM are normal. Pupils are equal, round, and reactive to light.   Neck: Neck supple.   Normal range of  motion.  Cardiovascular:  Normal rate, regular rhythm, normal heart sounds and intact distal pulses.           Pulmonary/Chest: Breath sounds normal. No respiratory distress.   Abdominal: Abdomen is soft. Bowel sounds are normal. He exhibits no distension. There is no abdominal tenderness. There is no rebound and no guarding.   Musculoskeletal:         General: No edema. Normal range of motion.      Cervical back: Normal range of motion and neck supple.     Neurological: He is alert and oriented to person, place, and time. He has normal strength. GCS score is 15. GCS eye subscore is 4. GCS verbal subscore is 5. GCS motor subscore is 6.   Skin: Skin is warm and dry. No rash noted.   Psychiatric: His behavior is normal.         ED Course   Procedures  Labs Reviewed   COMPREHENSIVE METABOLIC PANEL - Abnormal; Notable for the following components:       Result Value    Sodium 134 (*)     Glucose 149 (*)     Blood Urea Nitrogen 35.5 (*)     Creatinine 1.99 (*)     Calcium 10.1 (*)     Protein Total 8.2 (*)     Globulin 4.1 (*)     Albumin/Globulin Ratio 1.0 (*)     All other components within normal limits   URINALYSIS, REFLEX TO URINE CULTURE - Abnormal; Notable for the following components:    Protein, UA Trace (*)     Mucous, UA Trace (*)     Hyaline Casts, UA 0-2 (*)     All other components within normal limits   LIPASE - Normal   CBC W/ AUTO DIFFERENTIAL    Narrative:     The following orders were created for panel order CBC W/ AUTO DIFFERENTIAL.  Procedure                               Abnormality         Status                     ---------                               -----------         ------                     CBC with Differential[[id number]]                            Final result                 Please view results for these tests on the individual orders.   CBC WITH DIFFERENTIAL   EXTRA TUBES    Narrative:     The following orders were created for panel order EXTRA TUBES.  Procedure                                Abnormality         Status                     ---------                               -----------         ------                     Light Blue Top Hold[[id number]]                             In process                 Red Top Hold[[id number]]                                    In process                 Gold Top Hold[[id number]]                                   In process                   Please view results for these tests on the individual orders.   LIGHT BLUE TOP HOLD   RED TOP HOLD   GOLD TOP HOLD          Imaging Results    None          Medications - No data to display  Medical Decision Making  Amount and/or Complexity of Data Reviewed  External Data Reviewed: radiology.     Details: Technique:CT of the abdomen and pelvis was performed with axial images as well as sagittal and coronal reconstruction images without intravenous contrast.     Comparison: July 3, 2022 none available.     Clinical History:Nausea/vomiting; Abdominal pain, acute, nonlocalized.     Dosage Information:Automated Exposure Control was utilized.       Findings:     Lines and Tubes:None.     Thorax:     Lungs:There is moderate nonspecific dependent change at the lung bases. No focal infiltrate or consolidation is seen.     Pleura:No effusions are seen. No pneumothorax is seen. There is mild pleural thickening involving the posterobasal segment of bilateral lower lobes.     Abdomen:     Abdominal Wall:There is diffuse atrophy of the abdominal muscles, gluteal muscles and paraspinal muscles.     Within limitations of noncontrast technique, no acute findings of the liver, pancreas and the spleen identified.  There is pancreatic atrophy.     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     Biliary System:No intrahepatic or extrahepatic biliary duct dilatation is seen.     Gallbladder:Surgical clips are seen in the gallbladder fossa which may reflect prior cholecystectomy.      Adrenals:The adrenal glands appear unremarkable.     Kidneys:There are few small non-obstructive calculi seen in the both kidneys, largest measuring 3.4 mm in the upper pole of the left kidney. The kidneys appear otherwise unremarkable with no hydronephrosis.     Bowel:     Esophagus:The visualized esophagus appears unremarkable.     Stomach:The stomach appears unremarkable.     Duodenum:Unremarkable appearing duodenum.     Small Bowel:There are few variably dilated small bowel loops with no definitive transition point probably ileus. Follow as clinically indicated if there is clinical concern for developing small bowel obstruction.     Colon:Nondistended.     Appendix:The appendix appears unremarkable and is partially seen on image 63, Series 6.     Peritoneum:No intraperitoneal free air or ascites is seen.     Pelvis:     Bladder:The bladder is nondistended and cannot be definitively evaluated.     Male:     Prostate gland:The prostate gland appears unremarkable.     Bony structures:     Dorsal Spine:There is pronounced multilevel spondylosis of the visualized dorsal spine.     Bony Pelvis:There is pronounced degenerative change of the hip. There is diffuse osteoarthritis of bilateral hips.     Impression:  Impression:     1. There are few variably dilated small bowel loops with no definitive transition point probably ileus. Follow as clinically indicated if there is clinical concern for developing small bowel obstruction.     2. Details and findings as discussed.     No significant discrepancy with overnight report.        Electronically signed by:Marco A Stern  Date:                                            09/23/2022  Time:                                           06:40    Labs: ordered. Decision-making details documented in ED Course.      Additional MDM:   Differential Diagnosis:   Kidney stone, renal colic, pyelonephritis, aortic aneurysm, aortic dissection, musculoskeletal pain, renal failure, renal  malignancy among others                                      Clinical Impression:  Final diagnoses:  [N18.9] Chronic kidney disease, unspecified CKD stage (Primary)          ED Disposition Condition    Discharge Fair          ED Prescriptions    None       Follow-up Information       Follow up With Specialties Details Why Contact Info    Luis Eduardo Pulliam MD Family Medicine Schedule an appointment as soon as possible for a visit in 2 weeks  [address] [place] [address] A  [facility] [id number]  [phone]      [facility] Emergency Medicine  If symptoms worsen [address] [facility] [address] [phone]             Fadi Harvey MD  06/17/24 1215

## 2024-06-25 ENCOUNTER — OFFICE VISIT (OUTPATIENT)
Dept: CARDIOLOGY | Facility: CLINIC | Age: 69
End: 2024-06-25
Payer: MEDICARE

## 2024-06-25 VITALS
DIASTOLIC BLOOD PRESSURE: 84 MMHG | RESPIRATION RATE: 20 BRPM | TEMPERATURE: 98 F | BODY MASS INDEX: 31.56 KG/M2 | HEIGHT: 70 IN | HEART RATE: 65 BPM | OXYGEN SATURATION: 99 % | WEIGHT: 220.44 LBS | SYSTOLIC BLOOD PRESSURE: 116 MMHG

## 2024-06-25 DIAGNOSIS — I10 PRIMARY HYPERTENSION: ICD-10-CM

## 2024-06-25 DIAGNOSIS — G61.81 CIDP (CHRONIC INFLAMMATORY DEMYELINATING POLYNEUROPATHY): Primary | ICD-10-CM

## 2024-06-25 DIAGNOSIS — I50.22 CHRONIC SYSTOLIC HEART FAILURE: ICD-10-CM

## 2024-06-25 DIAGNOSIS — I48.0 PAROXYSMAL ATRIAL FIBRILLATION: ICD-10-CM

## 2024-06-25 PROCEDURE — 99215 OFFICE O/P EST HI 40 MIN: CPT | Mod: PBBFAC | Performed by: INTERNAL MEDICINE

## 2024-06-25 NOTE — PATIENT INSTRUCTIONS
You have been referred to Neurology ; they will contact you for an appointment.    You will need to schedule an echocardiogram before your next appointment. Centralized scheduling will contact you; contact sheet given if needed.

## 2024-07-22 ENCOUNTER — LAB VISIT (OUTPATIENT)
Dept: LAB | Facility: HOSPITAL | Age: 69
End: 2024-07-22
Attending: NURSE PRACTITIONER
Payer: MEDICARE

## 2024-07-22 DIAGNOSIS — N18.32 CKD STAGE G3B/A2, GFR 30-44 AND ALBUMIN CREATININE RATIO 30-299 MG/G: ICD-10-CM

## 2024-07-22 LAB
ALBUMIN SERPL-MCNC: 4 G/DL (ref 3.4–4.8)
ALBUMIN/GLOB SERPL: 1.1 RATIO (ref 1.1–2)
ALP SERPL-CCNC: 70 UNIT/L (ref 40–150)
ALT SERPL-CCNC: 10 UNIT/L (ref 0–55)
ANION GAP SERPL CALC-SCNC: 11 MEQ/L
AST SERPL-CCNC: 17 UNIT/L (ref 5–34)
BILIRUB SERPL-MCNC: 0.9 MG/DL
BUN SERPL-MCNC: 20 MG/DL (ref 8.4–25.7)
CALCIUM SERPL-MCNC: 9.9 MG/DL (ref 8.8–10)
CHLORIDE SERPL-SCNC: 100 MMOL/L (ref 98–107)
CO2 SERPL-SCNC: 20 MMOL/L (ref 23–31)
CREAT SERPL-MCNC: 1.99 MG/DL (ref 0.73–1.18)
CREAT/UREA NIT SERPL: 10
GFR SERPLBLD CREATININE-BSD FMLA CKD-EPI: 35 ML/MIN/1.73/M2
GLOBULIN SER-MCNC: 3.5 GM/DL (ref 2.4–3.5)
GLUCOSE SERPL-MCNC: 107 MG/DL (ref 82–115)
PHOSPHATE SERPL-MCNC: 2.2 MG/DL (ref 2.3–4.7)
POTASSIUM SERPL-SCNC: 4.4 MMOL/L (ref 3.5–5.1)
PROT SERPL-MCNC: 7.5 GM/DL (ref 5.8–7.6)
PTH-INTACT SERPL-MCNC: 188.3 PG/ML (ref 8.7–77)
SODIUM SERPL-SCNC: 131 MMOL/L (ref 136–145)

## 2024-07-22 PROCEDURE — 83970 ASSAY OF PARATHORMONE: CPT

## 2024-07-22 PROCEDURE — 36415 COLL VENOUS BLD VENIPUNCTURE: CPT

## 2024-07-22 PROCEDURE — 84100 ASSAY OF PHOSPHORUS: CPT

## 2024-07-22 PROCEDURE — 80053 COMPREHEN METABOLIC PANEL: CPT

## 2024-07-24 ENCOUNTER — OFFICE VISIT (OUTPATIENT)
Dept: NEPHROLOGY | Facility: CLINIC | Age: 69
End: 2024-07-24
Payer: MEDICARE

## 2024-07-24 VITALS
HEART RATE: 116 BPM | DIASTOLIC BLOOD PRESSURE: 71 MMHG | SYSTOLIC BLOOD PRESSURE: 99 MMHG | TEMPERATURE: 98 F | WEIGHT: 220.44 LBS | OXYGEN SATURATION: 100 % | BODY MASS INDEX: 31.56 KG/M2 | HEIGHT: 70 IN | RESPIRATION RATE: 20 BRPM

## 2024-07-24 DIAGNOSIS — E83.39 HYPOPHOSPHATEMIA: ICD-10-CM

## 2024-07-24 DIAGNOSIS — N25.81 SECONDARY HYPERPARATHYROIDISM OF RENAL ORIGIN: ICD-10-CM

## 2024-07-24 DIAGNOSIS — N18.32 CKD STAGE G3B/A2, GFR 30-44 AND ALBUMIN CREATININE RATIO 30-299 MG/G: Primary | ICD-10-CM

## 2024-07-24 DIAGNOSIS — G47.10 HYPERSOMNIA, UNSPECIFIED: ICD-10-CM

## 2024-07-24 PROCEDURE — 99213 OFFICE O/P EST LOW 20 MIN: CPT | Mod: PBBFAC | Performed by: NURSE PRACTITIONER

## 2024-07-24 PROCEDURE — 99214 OFFICE O/P EST MOD 30 MIN: CPT | Mod: S$PBB,,, | Performed by: NURSE PRACTITIONER

## 2024-07-24 RX ORDER — ERGOCALCIFEROL 1.25 MG/1
50000 CAPSULE ORAL
Qty: 4 CAPSULE | Refills: 2 | Status: SHIPPED | OUTPATIENT
Start: 2024-07-24 | End: 2024-10-22

## 2024-07-24 NOTE — PROGRESS NOTES
"[facility]  Nephrology Clinic Note    Chief complaint: Chronic Kidney Disease (Follow up)    History of present illness:   Omer Booker is a 68 y.o. White male with past medical history of chronic kidney disease, heart failure with reduced ejection fraction, hypertension, diabetes mellitus type 2, atrial fibrillation, demyelinating disease, and BPH. Presents for follow-up appointment in Nephrology Clinic today.  Complains of fatigue and excessive daytime sleepiness.    Review of Systems  12 point review of systems conducted, negative except as stated in the history of present illness.    Allergies: Patient is allergic to lisinopril.     Past Medical History:  has a past medical history of BPH (benign prostatic hyperplasia), CKD (chronic kidney disease) stage 3, GFR 30-59 ml/min, Gout, unspecified, Heart failure, unspecified, HTN (hypertension), Obesity, unspecified, and Paroxysmal atrial fibrillation.    Procedure History:  has a past surgical history that includes Cholecystectomy; Bronchoscopy; Bone marrow biopsy; and wisdom teeth removal.    Family History: family history includes Diabetes in his mother; Heart attack in his mother; Heart disease in his father; Kidney disease in his father.    Social History:  reports that he has never smoked. He has never used smokeless tobacco. He reports that he does not drink alcohol and does not use drugs.    Physical exam  BP 99/71 (BP Location: Left arm, Patient Position: Sitting, BP Method: Medium (Automatic))   Pulse (!) 116   Temp 97.8 °F (36.6 °C) (Oral)   Resp 20   Ht 5' 10" (1.778 m)   Wt 100 kg (220 lb 7.4 oz) Comment: estimated weight  SpO2 100%   BMI 31.63 kg/m²   General appearance:  Chronically ill-appearing male in no acute distress.  HEENT: PERRLA, EOMI, no scleral icterus, no JVD. Neck is supple.  Chest: Respirations are unlabored. Lungs sounds are clear.   Heart: S1, S2.   Abdomen: Benign.  : Deferred.  Extremities: No " edema, peripheral pulses are palpable.   Neuro:  Alert, oriented, speech is appropriate, wheelchair-bound.      Home Medications:    Current Outpatient Medications:     ELIQUIS 5 mg Tab, Take 5 mg by mouth 2 (two) times daily., Disp: , Rfl:     metoprolol tartrate (LOPRESSOR) 50 MG tablet, Take 50 mg by mouth 2 (two) times daily., Disp: , Rfl:     spironolactone (ALDACTONE) 25 MG tablet, Take 25 mg by mouth every other day., Disp: , Rfl:     Laboratory data    Lab Results   Component Value Date    WBC 7.59 06/17/2024    HGB 14.6 06/17/2024    HCT 43.3 06/17/2024     06/17/2024    IRON 52 (L) 01/06/2021    TIBC 277 01/06/2021    LABIRON 19 (L) 01/06/2021    FERRITIN 32.71 01/06/2021    AYBLXITY75 185 (L) 10/22/2018     (L) 07/22/2024    K 4.4 07/22/2024    CO2 20 (L) 07/22/2024    BUN 20.0 07/22/2024    CREATININE 1.99 (H) 07/22/2024    EGFRNORACEVR 35 07/22/2024    GLUCOSE 107 07/22/2024    CALCIUM 9.9 07/22/2024    ALKPHOS 70 07/22/2024    LABPROT 7.5 07/22/2024    ALBUMIN 4.0 07/22/2024    BILIDIR 0.3 04/05/2022    IBILI 0.50 04/05/2022    AST 17 07/22/2024    ALT 10 07/22/2024    MG 1.60 07/27/2023    PHOS 2.2 (L) 07/22/2024      Lab Results   Component Value Date    .3 (H) 07/22/2024    MNRADRUY43VT 37.9 05/04/2023    HIV Nonreactive 08/10/2018    HEPCAB Nonreactive 08/10/2018     Urine:  Lab Results   Component Value Date    APPEARANCEUA Clear 06/17/2024    SGUA 1.019 06/17/2024    PROTEINUA Trace (A) 06/17/2024    KETONESUA Negative 06/17/2024    LEUKOCYTESUR Negative 06/17/2024    RBCUA 0-5 06/17/2024    WBCUA 0-5 06/17/2024    BACTERIA None Seen 06/17/2024    SQEPUA None Seen 06/17/2024    HYALINECASTS 0-2 (A) 06/17/2024    CREATRANDUR 188.4 (H) 05/20/2024    PROTEINURINE 12.3 05/20/2024    UPROTCREA 0.1 05/20/2024         Imaging  CT Abdomen Pelvis  Without Contrast 09/23/2022  Lines and Tubes:None.  Thorax:  Lungs:There is moderate nonspecific dependent change at the lung bases. No  focal infiltrate or consolidation is seen.  Pleura:No effusions are seen. No pneumothorax is seen. There is mild pleural thickening involving the posterobasal segment of bilateral lower lobes.  Abdomen:  Abdominal Wall:There is diffuse atrophy of the abdominal muscles, gluteal muscles and paraspinal muscles.  Within limitations of noncontrast technique, no acute findings of the liver, pancreas and the spleen identified.  There is pancreatic atrophy.  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  Biliary System:No intrahepatic or extrahepatic biliary duct dilatation is seen.  Gallbladder:Surgical clips are seen in the gallbladder fossa which may reflect prior cholecystectomy.  Adrenals:The adrenal glands appear unremarkable.  Kidneys:There are few small non-obstructive calculi seen in the both kidneys, largest measuring 3.4 mm in the upper pole of the left kidney. The kidneys appear otherwise unremarkable with no hydronephrosis.  Bowel:  Esophagus:The visualized esophagus appears unremarkable.  Stomach:The stomach appears unremarkable.  Duodenum:Unremarkable appearing duodenum.  Small Bowel:There are few variably dilated small bowel loops with no definitive transition point probably ileus. Follow as clinically indicated if there is clinical concern for developing small bowel obstruction.  Colon:Nondistended.  Appendix:The appendix appears unremarkable and is partially seen on image 63, Series 6.  Peritoneum:No intraperitoneal free air or ascites is seen.  Pelvis:  Bladder:The bladder is nondistended and cannot be definitively evaluated.  Male:  Prostate gland:The prostate gland appears unremarkable.  Bony structures:  Dorsal Spine:There is pronounced multilevel spondylosis of the visualized dorsal spine.  Bony Pelvis:There is pronounced degenerative change of the hip. There is diffuse osteoarthritis of bilateral hips.     Impression  Impression:  1. There are few variably  dilated small bowel loops with no definitive transition point probably ileus. Follow as clinically indicated if there is clinical concern for developing small bowel obstruction.  2. Details and findings as discussed.      Impression    ICD-10-CM ICD-9-CM   1. CKD stage G3b/A2, GFR 30-44 and albumin creatinine ratio  mg/g  N18.32 585.3   2. Hypersomnia, unspecified  G47.10 780.54   3. Secondary hyperparathyroidism of renal origin  N25.81 588.81   4. Hypophosphatemia  E83.39 275.3   5. BMI 31.0-31.9,adult  Z68.31 V85.31        Plan  CKD stage G3b/A2, GFR 30-44 and albumin creatinine ratio  mg/g  -     Comprehensive Metabolic Panel; Future; Expected date: 11/20/2024  -     CBC Auto Differential; Future; Expected date: 11/20/2024  -     Protein/Creatinine Ratio, Urine; Future; Expected date: 11/20/2024  -     Urinalysis, Reflex to Urine Culture; Future; Expected date: 11/20/2024  Diabetic kidney disease. There is no significant proteinuria, imaging of the abdomen is without evidence of obstructive uropathy.   Continue risk factor management and MRA (patient is allergic to ACEi).   Continue:  -2 g a day dietary sodium restriction  -control diabetes (goal A1c less than 7%)  -control high blood pressure (goal blood pressure is less than 130/80, please check blood pressure twice a week and bring blood pressure logs to office visit)  -exercise at least 30 minutes a day, 5 days a week  -maintain healthy weight  -decrease or stop alcohol use  -do not smoke  -stay well hydrated (drink water only, avoid juices, sweet tea, and sodas)  -ask about staying up-to-date on vaccinations (flu vaccine, pneumonia vaccine, hepatitis B vaccine)  -avoid excessive use of NSAIDs (ibuprofen, naproxen, Aleve, Advil, Toradol, Mobic), take Tylenol as needed for headache or mild pain  -take cholesterol lowering medications if prescribed (LDL goal less than 100)    Hypersomnia, unspecified  Patient's wife reported some gasping night.   Will order home sleep study to evaluate for possible sleep apnea.      Secondary hyperparathyroidism of renal origin  Will start vitamin-D supplementation, check intact PTH, phosphorus, and vitamin-D level prior to next visit.    Hypophosphatemia  -     Phosphorus; Future; Expected date: 11/20/2024  -     PTH, Intact; Future; Expected date: 11/20/2024  -     Vitamin D; Future; Expected date: 11/20/2024  Will start vitamin-D supplementation, check intact PTH, phosphorus, and vitamin-D level prior to next visit.    BMI 31.0-31.9,adult  Lifestyle and dietary interventions discussed, patient encouraged to maintain non-sedentary lifestyle and well-balanced diet.        Follow up in about 4 months (around 11/24/2024).     7/24/2024  Gala Ordaz NP  [facility] Nephrology

## 2024-07-29 ENCOUNTER — PATIENT MESSAGE (OUTPATIENT)
Dept: NEPHROLOGY | Facility: CLINIC | Age: 69
End: 2024-07-29
Payer: MEDICARE

## 2024-08-16 ENCOUNTER — HOSPITAL ENCOUNTER (OUTPATIENT)
Dept: CARDIOLOGY | Facility: HOSPITAL | Age: 69
Discharge: HOME | End: 2024-08-16
Attending: INTERNAL MEDICINE
Payer: MEDICARE

## 2024-08-16 VITALS — WEIGHT: 220 LBS | BODY MASS INDEX: 31.5 KG/M2 | HEIGHT: 70 IN

## 2024-08-16 DIAGNOSIS — I50.22 CHRONIC SYSTOLIC HEART FAILURE: ICD-10-CM

## 2024-08-16 LAB
APICAL FOUR CHAMBER EJECTION FRACTION: 41 %
APICAL TWO CHAMBER EJECTION FRACTION: 40 %
AV INDEX (PROSTH): 0.52
AV MEAN GRADIENT: 5 MMHG
AV PEAK GRADIENT: 8 MMHG
AV VALVE AREA BY VELOCITY RATIO: 1.55 CM²
AV VALVE AREA: 1.91 CM²
AV VELOCITY RATIO: 0.42
BSA FOR ECHO PROCEDURE: 2.22 M2
CV ECHO LV RWT: 0.46 CM
DOP CALC AO PEAK VEL: 1.38 M/S
DOP CALC AO VTI: 22.6 CM
DOP CALC LVOT AREA: 3.7 CM2
DOP CALC LVOT DIAMETER: 2.17 CM
DOP CALC LVOT PEAK VEL: 0.58 M/S
DOP CALC LVOT STROKE VOLUME: 43.25 CM3
DOP CALC MV VTI: 15.3 CM
DOP CALCLVOT PEAK VEL VTI: 11.7 CM
E WAVE DECELERATION TIME: 138.66 MSEC
E/A RATIO: 0.51
ECHO LV POSTERIOR WALL: 1.2 CM (ref 0.6–1.1)
FRACTIONAL SHORTENING: 19 % (ref 28–44)
HR MV ECHO: 75 BPM
INTERVENTRICULAR SEPTUM: 1.18 CM (ref 0.6–1.1)
LEFT ATRIUM AREA SYSTOLIC (APICAL 2 CHAMBER): 17.45 CM2
LEFT ATRIUM AREA SYSTOLIC (APICAL 4 CHAMBER): 16.17 CM2
LEFT ATRIUM SIZE: 4.15 CM
LEFT ATRIUM VOLUME INDEX MOD: 19.8 ML/M2
LEFT ATRIUM VOLUME MOD: 42.99 CM3
LEFT INTERNAL DIMENSION IN SYSTOLE: 4.28 CM (ref 2.1–4)
LEFT VENTRICLE DIASTOLIC VOLUME INDEX: 61.5 ML/M2
LEFT VENTRICLE DIASTOLIC VOLUME: 133.46 ML
LEFT VENTRICLE END DIASTOLIC VOLUME APICAL 2 CHAMBER: 76.01 ML
LEFT VENTRICLE END DIASTOLIC VOLUME APICAL 4 CHAMBER: 88.09 ML
LEFT VENTRICLE END SYSTOLIC VOLUME APICAL 2 CHAMBER: 45.28 ML
LEFT VENTRICLE END SYSTOLIC VOLUME APICAL 4 CHAMBER: 31.59 ML
LEFT VENTRICLE MASS INDEX: 116 G/M2
LEFT VENTRICLE SYSTOLIC VOLUME INDEX: 38 ML/M2
LEFT VENTRICLE SYSTOLIC VOLUME: 82.37 ML
LEFT VENTRICULAR INTERNAL DIMENSION IN DIASTOLE: 5.27 CM (ref 3.5–6)
LEFT VENTRICULAR MASS: 251.31 G
LV LATERAL E/E' RATIO: 6 M/S
LVED V (TEICH): 133.46 ML
LVES V (TEICH): 82.37 ML
LVOT MG: 0.82 MMHG
LVOT MV: 0.42 CM/S
MV MEAN GRADIENT: 1 MMHG
MV PEAK A VEL: 0.7 M/S
MV PEAK E VEL: 0.36 M/S
MV PEAK GRADIENT: 3 MMHG
MV STENOSIS PRESSURE HALF TIME: 40.21 MS
MV VALVE AREA BY CONTINUITY EQUATION: 2.83 CM2
MV VALVE AREA P 1/2 METHOD: 5.47 CM2
OHS LV EJECTION FRACTION SIMPSONS BIPLANE MOD: 40 %
PISA MRMAX VEL: 5.09 M/S
RA MAJOR: 4.42 CM
TDI LATERAL: 0.06 M/S
TRICUSPID ANNULAR PLANE SYSTOLIC EXCURSION: 1.37 CM
Z-SCORE OF LEFT VENTRICULAR DIMENSION IN END DIASTOLE: -3.09
Z-SCORE OF LEFT VENTRICULAR DIMENSION IN END SYSTOLE: -0.17

## 2024-08-16 PROCEDURE — 93306 TTE W/DOPPLER COMPLETE: CPT

## 2024-10-30 ENCOUNTER — OFFICE VISIT (OUTPATIENT)
Dept: CARDIOLOGY | Facility: CLINIC | Age: 69
End: 2024-10-30
Payer: MEDICARE

## 2024-10-30 VITALS
DIASTOLIC BLOOD PRESSURE: 86 MMHG | TEMPERATURE: 98 F | HEIGHT: 70 IN | WEIGHT: 192.38 LBS | HEART RATE: 68 BPM | BODY MASS INDEX: 27.54 KG/M2 | RESPIRATION RATE: 20 BRPM | SYSTOLIC BLOOD PRESSURE: 125 MMHG | OXYGEN SATURATION: 100 %

## 2024-10-30 DIAGNOSIS — G62.89 PERIPHERAL DEMYELINATING NEUROPATHY: ICD-10-CM

## 2024-10-30 DIAGNOSIS — I50.32 HEART FAILURE WITH IMPROVED EJECTION FRACTION (HFIMPEF): ICD-10-CM

## 2024-10-30 DIAGNOSIS — I10 PRIMARY HYPERTENSION: ICD-10-CM

## 2024-10-30 DIAGNOSIS — I48.0 PAROXYSMAL ATRIAL FIBRILLATION: Primary | ICD-10-CM

## 2024-10-30 PROCEDURE — 99214 OFFICE O/P EST MOD 30 MIN: CPT | Mod: PBBFAC | Performed by: INTERNAL MEDICINE

## 2024-11-25 ENCOUNTER — LAB VISIT (OUTPATIENT)
Dept: LAB | Facility: HOSPITAL | Age: 69
End: 2024-11-25
Attending: NURSE PRACTITIONER
Payer: MEDICARE

## 2024-11-25 DIAGNOSIS — E83.39 HYPOPHOSPHATEMIA: ICD-10-CM

## 2024-11-25 DIAGNOSIS — N18.32 CKD STAGE G3B/A2, GFR 30-44 AND ALBUMIN CREATININE RATIO 30-299 MG/G: ICD-10-CM

## 2024-11-25 LAB
25(OH)D3+25(OH)D2 SERPL-MCNC: 32 NG/ML (ref 30–80)
ALBUMIN SERPL-MCNC: 3.8 G/DL (ref 3.4–4.8)
ALBUMIN/GLOB SERPL: 1.2 RATIO (ref 1.1–2)
ALP SERPL-CCNC: 61 UNIT/L (ref 40–150)
ALT SERPL-CCNC: 6 UNIT/L (ref 0–55)
ANION GAP SERPL CALC-SCNC: 8 MEQ/L
AST SERPL-CCNC: 16 UNIT/L (ref 5–34)
BACTERIA #/AREA URNS AUTO: ABNORMAL /HPF
BASOPHILS # BLD AUTO: 0.03 X10(3)/MCL
BASOPHILS NFR BLD AUTO: 0.5 %
BILIRUB SERPL-MCNC: 0.9 MG/DL
BILIRUB UR QL STRIP.AUTO: NEGATIVE
BUN SERPL-MCNC: 17.6 MG/DL (ref 8.4–25.7)
CALCIUM SERPL-MCNC: 9.8 MG/DL (ref 8.8–10)
CHLORIDE SERPL-SCNC: 104 MMOL/L (ref 98–107)
CLARITY UR: CLEAR
CO2 SERPL-SCNC: 24 MMOL/L (ref 23–31)
COLOR UR AUTO: ABNORMAL
CREAT SERPL-MCNC: 2.27 MG/DL (ref 0.72–1.25)
CREAT UR-MCNC: 150.7 MG/DL (ref 63–166)
CREAT/UREA NIT SERPL: 8
EOSINOPHIL # BLD AUTO: 0.11 X10(3)/MCL (ref 0–0.9)
EOSINOPHIL NFR BLD AUTO: 2 %
ERYTHROCYTE [DISTWIDTH] IN BLOOD BY AUTOMATED COUNT: 13.2 % (ref 11.5–17)
GFR SERPLBLD CREATININE-BSD FMLA CKD-EPI: 30 ML/MIN/1.73/M2
GLOBULIN SER-MCNC: 3.3 GM/DL (ref 2.4–3.5)
GLUCOSE SERPL-MCNC: 106 MG/DL (ref 82–115)
GLUCOSE UR QL STRIP: NORMAL
HCT VFR BLD AUTO: 35.6 % (ref 42–52)
HGB BLD-MCNC: 12.6 G/DL (ref 14–18)
HGB UR QL STRIP: ABNORMAL
HYALINE CASTS #/AREA URNS LPF: ABNORMAL /LPF
IMM GRANULOCYTES # BLD AUTO: 0.02 X10(3)/MCL (ref 0–0.04)
IMM GRANULOCYTES NFR BLD AUTO: 0.4 %
KETONES UR QL STRIP: NEGATIVE
LEUKOCYTE ESTERASE UR QL STRIP: NEGATIVE
LYMPHOCYTES # BLD AUTO: 1.23 X10(3)/MCL (ref 0.6–4.6)
LYMPHOCYTES NFR BLD AUTO: 22.2 %
MCH RBC QN AUTO: 30.7 PG (ref 27–31)
MCHC RBC AUTO-ENTMCNC: 35.4 G/DL (ref 33–36)
MCV RBC AUTO: 86.8 FL (ref 80–94)
MONOCYTES # BLD AUTO: 0.43 X10(3)/MCL (ref 0.1–1.3)
MONOCYTES NFR BLD AUTO: 7.8 %
MUCOUS THREADS URNS QL MICRO: ABNORMAL /LPF
NEUTROPHILS # BLD AUTO: 3.71 X10(3)/MCL (ref 2.1–9.2)
NEUTROPHILS NFR BLD AUTO: 67.1 %
NITRITE UR QL STRIP: NEGATIVE
NRBC BLD AUTO-RTO: 0 %
PH UR STRIP: 6 [PH]
PHOSPHATE SERPL-MCNC: 2.4 MG/DL (ref 2.3–4.7)
PLATELET # BLD AUTO: 219 X10(3)/MCL (ref 130–400)
PMV BLD AUTO: 9.3 FL (ref 7.4–10.4)
POTASSIUM SERPL-SCNC: 4.6 MMOL/L (ref 3.5–5.1)
PROT SERPL-MCNC: 7.1 GM/DL (ref 5.8–7.6)
PROT UR QL STRIP: NEGATIVE
PROT UR STRIP-MCNC: 12 MG/DL
PTH-INTACT SERPL-MCNC: 94.9 PG/ML (ref 8.7–77)
RBC # BLD AUTO: 4.1 X10(6)/MCL (ref 4.7–6.1)
RBC #/AREA URNS AUTO: ABNORMAL /HPF
SODIUM SERPL-SCNC: 136 MMOL/L (ref 136–145)
SP GR UR STRIP.AUTO: 1.01 (ref 1–1.03)
SQUAMOUS #/AREA URNS LPF: ABNORMAL /HPF
URINE PROTEIN/CREATININE RATIO (OLG): 0.1
UROBILINOGEN UR STRIP-ACNC: NORMAL
WBC # BLD AUTO: 5.53 X10(3)/MCL (ref 4.5–11.5)
WBC #/AREA URNS AUTO: ABNORMAL /HPF

## 2024-11-25 PROCEDURE — 80053 COMPREHEN METABOLIC PANEL: CPT

## 2024-11-25 PROCEDURE — 81015 MICROSCOPIC EXAM OF URINE: CPT

## 2024-11-25 PROCEDURE — 36415 COLL VENOUS BLD VENIPUNCTURE: CPT

## 2024-11-25 PROCEDURE — 82306 VITAMIN D 25 HYDROXY: CPT

## 2024-11-25 PROCEDURE — 85025 COMPLETE CBC W/AUTO DIFF WBC: CPT

## 2024-11-25 PROCEDURE — 84100 ASSAY OF PHOSPHORUS: CPT

## 2024-11-25 PROCEDURE — 82570 ASSAY OF URINE CREATININE: CPT

## 2024-11-25 PROCEDURE — 83970 ASSAY OF PARATHORMONE: CPT

## 2024-12-05 ENCOUNTER — OFFICE VISIT (OUTPATIENT)
Dept: NEPHROLOGY | Facility: CLINIC | Age: 69
End: 2024-12-05
Payer: MEDICARE

## 2024-12-05 DIAGNOSIS — D63.1 ANEMIA, CHRONIC RENAL FAILURE, STAGE 3 (MODERATE): ICD-10-CM

## 2024-12-05 DIAGNOSIS — N18.30 ANEMIA, CHRONIC RENAL FAILURE, STAGE 3 (MODERATE): ICD-10-CM

## 2024-12-05 DIAGNOSIS — N18.32 CKD STAGE G3B/A2, GFR 30-44 AND ALBUMIN CREATININE RATIO 30-299 MG/G: Primary | ICD-10-CM

## 2024-12-05 DIAGNOSIS — I10 PRIMARY HYPERTENSION: ICD-10-CM

## 2024-12-05 DIAGNOSIS — N25.81 SECONDARY HYPERPARATHYROIDISM OF RENAL ORIGIN: ICD-10-CM

## 2024-12-05 DIAGNOSIS — E11.69 TYPE 2 DIABETES MELLITUS WITH OTHER SPECIFIED COMPLICATION, UNSPECIFIED WHETHER LONG TERM INSULIN USE: ICD-10-CM

## 2024-12-05 DIAGNOSIS — I50.22 CHRONIC SYSTOLIC HEART FAILURE: ICD-10-CM

## 2024-12-05 NOTE — PROGRESS NOTES
This is a telemedicine note.   I have reviewed the patient's name and date of birth.  I have verbally reviewed the past medical history, active medication list, and allergies with the patient.  I have verified that this patient is in the state of [place].  Patient was treated using telemedicine, real-time audio and video, according to [facility] protocols. I, distant provider, conducted the visit from [facility] and [facility] in [place], [place]. The patient participated in the visit at a non-[facility] location. Patient was located at: Home. I am licensed in the state where the patient stated they are located. The patient stated that they understood and accepted the privacy and security risks to their information in their location.    Verbal consent to participate in interactive audio & video visit was obtained. Patient was explained that:  - Our sessions are not being recorded and that personal health information is protected   - I would evaluate the patient and recommend diagnostics and treatments based on my assessment  - The team will provide follow up care in person if/when the patient needs it.    Total time spent with patient was 25 minutes, over 50% of which was used for education and counseling regarding medical conditions, current medications including risk/benefit and side effects/adverse events, over-the-counter medication uses/doses, home self-care and contact precautions, and red flags and indications for immediate medical attention. The patient is receptive, expresses understanding and is agreeable to plan. All questions answered and concerns addressed.  [facility]  Nephrology Clinic Note    Chief complaint: Chronic Kidney Disease (Follow up)    History of present illness:   Omer Booker is a 69 y.o. White male with past medical history of chronic kidney disease, heart failure with reduced ejection fraction (EF 40%), hypertension, diabetes mellitus type 2,  atrial fibrillation, demyelinating disease, and BPH. Presents for follow-up appointment in Nephrology Clinic today. Denies complaints.     Review of Systems  12 point review of systems conducted, negative except as stated in the history of present illness.    Allergies: Patient is allergic to lisinopril.     Past Medical History:  has a past medical history of BPH (benign prostatic hyperplasia), CKD (chronic kidney disease) stage 3, GFR 30-59 ml/min, Gout, unspecified, Heart failure, unspecified, HTN (hypertension), Obesity, unspecified, and Paroxysmal atrial fibrillation.    Procedure History:  has a past surgical history that includes Cholecystectomy; Bronchoscopy; Bone marrow biopsy; and wisdom teeth removal.    Family History: family history includes Diabetes in his mother; Heart attack in his mother; Heart disease in his father; Kidney disease in his father.    Social History:  reports that he has never smoked. He has never used smokeless tobacco. He reports that he does not drink alcohol and does not use drugs.    Physical exam  General appearance: Patient is in no acute distress.    Home Medications:    Current Outpatient Medications:     ELIQUIS 5 mg Tab, Take 5 mg by mouth 2 (two) times daily., Disp: , Rfl:     spironolactone (ALDACTONE) 25 MG tablet, Take 25 mg by mouth every other day., Disp: , Rfl:     metoprolol tartrate (LOPRESSOR) 50 MG tablet, Take 50 mg by mouth 2 (two) times daily. (Patient not taking: Reported on 12/5/2024), Disp: , Rfl:     Laboratory data    Lab Results   Component Value Date    WBC 5.53 11/25/2024    HGB 12.6 (L) 11/25/2024    HCT 35.6 (L) 11/25/2024     11/25/2024    IRON 52 (L) 01/06/2021    TIBC 277 01/06/2021    LABIRON 19 (L) 01/06/2021    FERRITIN 32.71 01/06/2021    CERLFXST64 185 (L) 10/22/2018     11/25/2024    K 4.6 11/25/2024    CO2 24 11/25/2024    BUN 17.6 11/25/2024    CREATININE 2.27 (H) 11/25/2024    EGFRNORACEVR 30 11/25/2024    GLUCOSE 106  11/25/2024    CALCIUM 9.8 11/25/2024    ALKPHOS 61 11/25/2024    LABPROT 7.1 11/25/2024    ALBUMIN 3.8 11/25/2024    BILIDIR 0.3 04/05/2022    IBILI 0.50 04/05/2022    AST 16 11/25/2024    ALT 6 11/25/2024    MG 1.60 07/27/2023    PHOS 2.4 11/25/2024      Lab Results   Component Value Date    PTH 94.9 (H) 11/25/2024    STZDHMUV68SM 32 11/25/2024    HIV Nonreactive 08/10/2018    HEPCAB Nonreactive 08/10/2018     Urine:  Lab Results   Component Value Date    APPEARANCEUA Clear 11/25/2024    SGUA 1.013 11/25/2024    PROTEINUA Negative 11/25/2024    KETONESUA Negative 11/25/2024    LEUKOCYTESUR Negative 11/25/2024    RBCUA 0-5 11/25/2024    WBCUA 0-5 11/25/2024    BACTERIA None Seen 11/25/2024    SQEPUA Trace (A) 11/25/2024    HYALINECASTS None Seen 11/25/2024    CREATRANDUR 150.7 11/25/2024    PROTEINURINE 12.0 11/25/2024    UPROTCREA 0.1 11/25/2024         Imaging  CT Abdomen Pelvis  Without Contrast 09/23/2022  Lines and Tubes:None.  Thorax:  Lungs:There is moderate nonspecific dependent change at the lung bases. No focal infiltrate or consolidation is seen.  Pleura:No effusions are seen. No pneumothorax is seen. There is mild pleural thickening involving the posterobasal segment of bilateral lower lobes.  Abdomen:  Abdominal Wall:There is diffuse atrophy of the abdominal muscles, gluteal muscles and paraspinal muscles.  Within limitations of noncontrast technique, no acute findings of the liver, pancreas and the spleen identified.  There is pancreatic atrophy.  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  Biliary System:No intrahepatic or extrahepatic biliary duct dilatation is seen.  Gallbladder:Surgical clips are seen in the gallbladder fossa which may reflect prior cholecystectomy.  Adrenals:The adrenal glands appear unremarkable.  Kidneys:There are few small non-obstructive calculi seen in the both kidneys, largest measuring 3.4 mm in the upper pole of the left  kidney. The kidneys appear otherwise unremarkable with no hydronephrosis.  Bowel:  Esophagus:The visualized esophagus appears unremarkable.  Stomach:The stomach appears unremarkable.  Duodenum:Unremarkable appearing duodenum.  Small Bowel:There are few variably dilated small bowel loops with no definitive transition point probably ileus. Follow as clinically indicated if there is clinical concern for developing small bowel obstruction.  Colon:Nondistended.  Appendix:The appendix appears unremarkable and is partially seen on image 63, Series 6.  Peritoneum:No intraperitoneal free air or ascites is seen.  Pelvis:  Bladder:The bladder is nondistended and cannot be definitively evaluated.  Male:  Prostate gland:The prostate gland appears unremarkable.  Bony structures:  Dorsal Spine:There is pronounced multilevel spondylosis of the visualized dorsal spine.  Bony Pelvis:There is pronounced degenerative change of the hip. There is diffuse osteoarthritis of bilateral hips.     Impression  Impression:  1. There are few variably dilated small bowel loops with no definitive transition point probably ileus. Follow as clinically indicated if there is clinical concern for developing small bowel obstruction.  2. Details and findings as discussed.    Impression    ICD-10-CM ICD-9-CM   1. CKD stage G3b/A2, GFR 30-44 and albumin creatinine ratio  mg/g  N18.32 585.3   2. Chronic systolic heart failure  I50.22 428.22   3. Type 2 diabetes mellitus with other specified complication, unspecified whether long term insulin use  E11.69 250.80   4. Primary hypertension  I10 401.9   5. Anemia, chronic renal failure, stage 3 (moderate)  N18.30 285.21    D63.1 585.3   6. Secondary hyperparathyroidism of renal origin  N25.81 588.81        Plan  CKD stage G3b/A2, GFR 30-44 and albumin creatinine ratio  mg/g  -     Comprehensive Metabolic Panel; Future; Expected date: 03/25/2025  -     Protein/Creatinine Ratio, Urine; Future; Expected  date: 03/25/2025  -     Urinalysis, Reflex to Urine Culture; Future; Expected date: 03/25/2025  Diabetic kidney disease. There is no significant proteinuria, imaging of the abdomen is without evidence of obstructive uropathy.   Continue risk factor management and MRA (patient is allergic to ACEi).   Continue:  -2 g a day dietary sodium restriction  -control diabetes (goal A1c less than 7%)  -control high blood pressure (goal blood pressure is less than 130/80, please check blood pressure twice a week and bring blood pressure logs to office visit)  -exercise at least 30 minutes a day, 5 days a week  -maintain healthy weight  -decrease or stop alcohol use  -do not smoke  -stay well hydrated (drink water only, avoid juices, sweet tea, and sodas)  -ask about staying up-to-date on vaccinations (flu vaccine, pneumonia vaccine, hepatitis B vaccine)  -avoid excessive use of NSAIDs (ibuprofen, naproxen, Aleve, Advil, Toradol, Mobic), take Tylenol as needed for headache or mild pain  -take cholesterol lowering medications if prescribed (LDL goal less than 100)    Chronic systolic heart failure  Continue to follow up with cardiology. Continue MRA (patient is allergic to ACEi).     Type 2 diabetes mellitus with other specified complication, unspecified whether long term insulin use  Diabetes is diet controlled. Continue MRA.     Primary hypertension  Blood pressure reading is at goal, continue current antihypertensive regimen and 2 g a day dietary sodium restriction.      Anemia, chronic renal failure, stage 3 (moderate)  There are no indications for erythropoietin stimulating agent therapy at present.  Will continue to monitor hemoglobin and hematocrit levels periodically.    Secondary hyperparathyroidism of renal origin  There are no indications for active vitamin-D analogs at this time.  Will continue to monitor intact PTH, calcium, and phosphorus levels periodically.        Follow up in about 4 months (around 4/5/2025).      Orders Placed This Encounter   Procedures    Comprehensive Metabolic Panel     Standing Status:   Future     Standing Expiration Date:   4/25/2025    Protein/Creatinine Ratio, Urine     Standing Status:   Future     Standing Expiration Date:   4/25/2025     Order Specific Question:   Specimen Source     Answer:   Urine    Urinalysis, Reflex to Urine Culture     Standing Status:   Future     Standing Expiration Date:   4/25/2025     Order Specific Question:   Specimen Source     Answer:   Urine        Gala Ordaz NP  [facility]

## 2025-04-01 ENCOUNTER — LAB VISIT (OUTPATIENT)
Dept: LAB | Facility: HOSPITAL | Age: 70
End: 2025-04-01
Attending: NURSE PRACTITIONER
Payer: MEDICARE

## 2025-04-01 DIAGNOSIS — N18.32 CKD STAGE G3B/A2, GFR 30-44 AND ALBUMIN CREATININE RATIO 30-299 MG/G: ICD-10-CM

## 2025-04-01 LAB
ALBUMIN SERPL-MCNC: 3.5 G/DL (ref 3.4–4.8)
ALBUMIN/GLOB SERPL: 0.9 RATIO (ref 1.1–2)
ALP SERPL-CCNC: 57 UNIT/L (ref 40–150)
ALT SERPL-CCNC: 16 UNIT/L (ref 0–55)
ANION GAP SERPL CALC-SCNC: 6 MEQ/L
AST SERPL-CCNC: 19 UNIT/L (ref 11–45)
BACTERIA #/AREA URNS AUTO: ABNORMAL /HPF
BILIRUB SERPL-MCNC: 0.8 MG/DL
BILIRUB UR QL STRIP.AUTO: NEGATIVE
BUN SERPL-MCNC: 18.6 MG/DL (ref 8.4–25.7)
CALCIUM SERPL-MCNC: 9 MG/DL (ref 8.8–10)
CHLORIDE SERPL-SCNC: 104 MMOL/L (ref 98–107)
CLARITY UR: CLEAR
CO2 SERPL-SCNC: 24 MMOL/L (ref 23–31)
COLOR UR AUTO: ABNORMAL
CREAT SERPL-MCNC: 1.63 MG/DL (ref 0.72–1.25)
CREAT UR-MCNC: 150.8 MG/DL (ref 63–166)
CREAT/UREA NIT SERPL: 11
GFR SERPLBLD CREATININE-BSD FMLA CKD-EPI: 45 ML/MIN/1.73/M2
GLOBULIN SER-MCNC: 3.9 GM/DL (ref 2.4–3.5)
GLUCOSE SERPL-MCNC: 159 MG/DL (ref 82–115)
GLUCOSE UR QL STRIP: NORMAL
HGB UR QL STRIP: NEGATIVE
HYALINE CASTS #/AREA URNS LPF: ABNORMAL /LPF
KETONES UR QL STRIP: NEGATIVE
LEUKOCYTE ESTERASE UR QL STRIP: NEGATIVE
MUCOUS THREADS URNS QL MICRO: ABNORMAL /LPF
NITRITE UR QL STRIP: NEGATIVE
PH UR STRIP: 5.5 [PH]
POTASSIUM SERPL-SCNC: 3.6 MMOL/L (ref 3.5–5.1)
PROT SERPL-MCNC: 7.4 GM/DL (ref 5.8–7.6)
PROT UR QL STRIP: ABNORMAL
PROT UR STRIP-MCNC: 27.4 MG/DL
RBC #/AREA URNS AUTO: ABNORMAL /HPF
SODIUM SERPL-SCNC: 134 MMOL/L (ref 136–145)
SP GR UR STRIP.AUTO: 1.02 (ref 1–1.03)
SQUAMOUS #/AREA URNS LPF: ABNORMAL /HPF
URINE PROTEIN/CREATININE RATIO (OLG): 0.2
UROBILINOGEN UR STRIP-ACNC: NORMAL
WBC #/AREA URNS AUTO: ABNORMAL /HPF

## 2025-04-01 PROCEDURE — 84156 ASSAY OF PROTEIN URINE: CPT

## 2025-04-01 PROCEDURE — 81001 URINALYSIS AUTO W/SCOPE: CPT

## 2025-04-01 PROCEDURE — 80053 COMPREHEN METABOLIC PANEL: CPT

## 2025-04-01 PROCEDURE — 36415 COLL VENOUS BLD VENIPUNCTURE: CPT

## 2025-04-07 ENCOUNTER — OFFICE VISIT (OUTPATIENT)
Dept: NEPHROLOGY | Facility: CLINIC | Age: 70
End: 2025-04-07
Payer: MEDICARE

## 2025-04-07 VITALS — DIASTOLIC BLOOD PRESSURE: 81 MMHG | SYSTOLIC BLOOD PRESSURE: 128 MMHG

## 2025-04-07 DIAGNOSIS — I10 PRIMARY HYPERTENSION: ICD-10-CM

## 2025-04-07 DIAGNOSIS — N18.31 CKD STAGE G3A/A1, GFR 45-59 AND ALBUMIN CREATININE RATIO <30 MG/G: Primary | ICD-10-CM

## 2025-04-07 DIAGNOSIS — E11.69 TYPE 2 DIABETES MELLITUS WITH OTHER SPECIFIED COMPLICATION, UNSPECIFIED WHETHER LONG TERM INSULIN USE: ICD-10-CM

## 2025-04-07 DIAGNOSIS — I50.22 CHRONIC SYSTOLIC HEART FAILURE: ICD-10-CM

## 2025-04-07 PROCEDURE — 98006 SYNCH AUDIO-VIDEO EST MOD 30: CPT | Mod: 95,,, | Performed by: NURSE PRACTITIONER

## 2025-04-07 NOTE — PROGRESS NOTES
The patient location is:  Home  The chief complaint leading to consultation is:  Management of chronic kidney disease    Visit type: audiovisual    Face to Face time with patient:  11 minutes  Thirty minutes of total time spent on the encounter, which includes face to face time and non-face to face time preparing to see the patient (eg, review of tests), Obtaining and/or reviewing separately obtained history, Documenting clinical information in the electronic or other health record, Independently interpreting results (not separately reported) and communicating results to the patient/family/caregiver, or Care coordination (not separately reported).         Each patient to whom he or she provides medical services by telemedicine is:  (1) informed of the relationship between the physician and patient and the respective role of any other health care provider with respect to management of the patient; and (2) notified that he or she may decline to receive medical services by telemedicine and may withdraw from such care at any time.    Notes:      [facility]  Nephrology Clinic Note    Chief complaint: Chronic Kidney Disease (Follow up)    History of present illness:   Omer Booker is a 69 y.o. White male with past medical history of chronic kidney disease, heart failure with reduced ejection fraction (EF 40%), hypertension, diabetes mellitus type 2, atrial fibrillation, demyelinating disease, and BPH. Presents for follow-up appointment in Nephrology Clinic today. Denies complaints.     Review of Systems  12 point review of systems conducted, negative except as stated in the history of present illness.    Allergies: Patient is allergic to lisinopril.     Past Medical History:  has a past medical history of BPH (benign prostatic hyperplasia), CKD (chronic kidney disease) stage 3, GFR 30-59 ml/min, Gout, unspecified, Heart failure, unspecified, HTN (hypertension), Obesity, unspecified, and  Paroxysmal atrial fibrillation.    Procedure History:  has a past surgical history that includes Cholecystectomy; Bronchoscopy; Bone marrow biopsy; and wisdom teeth removal.    Family History: family history includes Diabetes in his mother; Heart attack in his mother; Heart disease in his father; Kidney disease in his father.    Social History:  reports that he has never smoked. He has never used smokeless tobacco. He reports that he does not drink alcohol and does not use drugs.    Physical exam  /81   General appearance: Patient is in no acute distress.    Home Medications:  Current Medications[1]    Laboratory data    Lab Results   Component Value Date    WBC 5.53 11/25/2024    HGB 12.6 (L) 11/25/2024    HCT 35.6 (L) 11/25/2024     11/25/2024    IRON 52 (L) 01/06/2021    TIBC 277 01/06/2021    LABIRON 19 (L) 01/06/2021    FERRITIN 32.71 01/06/2021    KVQSSICM78 185 (L) 10/22/2018     (L) 04/01/2025    K 3.6 04/01/2025    CO2 24 04/01/2025    BUN 18.6 04/01/2025    CREATININE 1.63 (H) 04/01/2025    EGFRNORACEVR 45 04/01/2025    GLUCOSE 159 (H) 04/01/2025    CALCIUM 9.0 04/01/2025    ALKPHOS 57 04/01/2025    LABPROT 7.4 04/01/2025    ALBUMIN 3.5 04/01/2025    BILIDIR 0.3 04/05/2022    IBILI 0.50 04/05/2022    AST 19 04/01/2025    ALT 16 04/01/2025    MG 1.60 07/27/2023    PHOS 2.4 11/25/2024      Lab Results   Component Value Date    PTH 94.9 (H) 11/25/2024    [id number] 32 11/25/2024    HIV Nonreactive 08/10/2018    HEPCAB Nonreactive 08/10/2018     Urine:  Lab Results   Component Value Date    APPEARANCEUA Clear 04/01/2025    SGUA 1.016 04/01/2025    PROTEINUA Trace (A) 04/01/2025    KETONESUA Negative 04/01/2025    LEUKOCYTESUR Negative 04/01/2025    RBCUA 0-5 04/01/2025    WBCUA 0-5 04/01/2025    BACTERIA None Seen 04/01/2025    SQEPUA Trace (A) 04/01/2025    HYALINECASTS None Seen 04/01/2025    CREATRANDUR 150.8 04/01/2025    PROTEINURINE 27.4 04/01/2025    UPROTCREA 0.2 04/01/2025          Imaging  CT Abdomen Pelvis  Without Contrast 09/23/2022  Lines and Tubes:None.  Thorax:  Lungs:There is moderate nonspecific dependent change at the lung bases. No focal infiltrate or consolidation is seen.  Pleura:No effusions are seen. No pneumothorax is seen. There is mild pleural thickening involving the posterobasal segment of bilateral lower lobes.  Abdomen:  Abdominal Wall:There is diffuse atrophy of the abdominal muscles, gluteal muscles and paraspinal muscles.  Within limitations of noncontrast technique, no acute findings of the liver, pancreas and the spleen identified.  There is pancreatic atrophy.  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  Biliary System:No intrahepatic or extrahepatic biliary duct dilatation is seen.  Gallbladder:Surgical clips are seen in the gallbladder fossa which may reflect prior cholecystectomy.  Adrenals:The adrenal glands appear unremarkable.  Kidneys:There are few small non-obstructive calculi seen in the both kidneys, largest measuring 3.4 mm in the upper pole of the left kidney. The kidneys appear otherwise unremarkable with no hydronephrosis.  Bowel:  Esophagus:The visualized esophagus appears unremarkable.  Stomach:The stomach appears unremarkable.  Duodenum:Unremarkable appearing duodenum.  Small Bowel:There are few variably dilated small bowel loops with no definitive transition point probably ileus. Follow as clinically indicated if there is clinical concern for developing small bowel obstruction.  Colon:Nondistended.  Appendix:The appendix appears unremarkable and is partially seen on image 63, Series 6.  Peritoneum:No intraperitoneal free air or ascites is seen.  Pelvis:  Bladder:The bladder is nondistended and cannot be definitively evaluated.  Male:  Prostate gland:The prostate gland appears unremarkable.  Bony structures:  Dorsal Spine:There is pronounced multilevel spondylosis of the visualized dorsal  spine.  Bony Pelvis:There is pronounced degenerative change of the hip. There is diffuse osteoarthritis of bilateral hips.     Impression  Impression:  1. There are few variably dilated small bowel loops with no definitive transition point probably ileus. Follow as clinically indicated if there is clinical concern for developing small bowel obstruction.  2. Details and findings as discussed.    Impression    ICD-10-CM ICD-9-CM   1. CKD stage G3a/A1, GFR 45-59 and albumin creatinine ratio <30 mg/g  N18.31 585.3   2. Chronic systolic heart failure  I50.22 428.22   3. Type 2 diabetes mellitus with other specified complication, unspecified whether long term insulin use  E11.69 250.80   4. Primary hypertension  I10 401.9        Plan  CKD stage G3a/A1, GFR 45-59 and albumin creatinine ratio <30 mg/g  -     Comprehensive Metabolic Panel; Future; Expected date: 09/27/2025  -     Protein/Creatinine Ratio, Urine; Future; Expected date: 09/27/2025  -     PTH, Intact; Future; Expected date: 09/27/2025  -     Urinalysis, Reflex to Urine Culture; Future; Expected date: 09/27/2025  -     CBC Auto Differential; Future; Expected date: 09/27/2025  Diabetic kidney disease. There is no significant proteinuria, imaging of the abdomen is without evidence of obstructive uropathy.   Continue risk factor management and MRA (patient is allergic to ACEi).   Continue:  -2 g a day dietary sodium restriction  -control diabetes (goal A1c less than 7%)  -control high blood pressure (goal blood pressure is less than 130/80, please check blood pressure twice a week and bring blood pressure logs to office visit)  -exercise at least 30 minutes a day, 5 days a week  -maintain healthy weight  -decrease or stop alcohol use  -do not smoke  -stay well hydrated (drink water only, avoid juices, sweet tea, and sodas)  -ask about staying up-to-date on vaccinations (flu vaccine, pneumonia vaccine, hepatitis B vaccine)  -avoid excessive use of NSAIDs (ibuprofen,  naproxen, Aleve, Advil, Toradol, Mobic), take Tylenol as needed for headache or mild pain  -take cholesterol lowering medications if prescribed (LDL goal less than 100)    Chronic systolic heart failure  Patient is euvolemic and asymptomatic.  Continue spironolactone and beta-blocker.      Type 2 diabetes mellitus with other specified complication, unspecified whether long term insulin use  A1c is at goal, continue current medication regimen.      Primary hypertension  Blood pressure reading is at goal, continue current antihypertensive regimen and 2 g a day dietary sodium restriction.          Follow up in about 6 months (around 10/7/2025).       Gala Ordaz NP  [facility]            [1]   Current Outpatient Medications:     ELIQUIS 5 mg Tab, Take 5 mg by mouth 2 (two) times daily., Disp: , Rfl:     metoprolol tartrate (LOPRESSOR) 50 MG tablet, Take 50 mg by mouth 2 (two) times daily., Disp: , Rfl:     spironolactone (ALDACTONE) 25 MG tablet, Take 25 mg by mouth every other day., Disp: , Rfl: